# Patient Record
Sex: FEMALE | Race: WHITE | NOT HISPANIC OR LATINO | Employment: UNEMPLOYED | URBAN - METROPOLITAN AREA
[De-identification: names, ages, dates, MRNs, and addresses within clinical notes are randomized per-mention and may not be internally consistent; named-entity substitution may affect disease eponyms.]

---

## 2021-03-25 ENCOUNTER — TELEPHONE (OUTPATIENT)
Dept: INTERNAL MEDICINE CLINIC | Facility: CLINIC | Age: 36
End: 2021-03-25

## 2021-03-25 ENCOUNTER — OFFICE VISIT (OUTPATIENT)
Dept: INTERNAL MEDICINE CLINIC | Facility: CLINIC | Age: 36
End: 2021-03-25
Payer: COMMERCIAL

## 2021-03-25 VITALS
WEIGHT: 152.4 LBS | HEIGHT: 69 IN | DIASTOLIC BLOOD PRESSURE: 70 MMHG | SYSTOLIC BLOOD PRESSURE: 102 MMHG | OXYGEN SATURATION: 97 % | TEMPERATURE: 98.3 F | HEART RATE: 100 BPM | BODY MASS INDEX: 22.57 KG/M2

## 2021-03-25 DIAGNOSIS — Z86.79: ICD-10-CM

## 2021-03-25 DIAGNOSIS — Z00.00 ANNUAL PHYSICAL EXAM: Primary | ICD-10-CM

## 2021-03-25 DIAGNOSIS — R56.9 SEIZURES (HCC): ICD-10-CM

## 2021-03-25 DIAGNOSIS — R21 RASH AND NONSPECIFIC SKIN ERUPTION: ICD-10-CM

## 2021-03-25 DIAGNOSIS — F41.1 GAD (GENERALIZED ANXIETY DISORDER): ICD-10-CM

## 2021-03-25 PROCEDURE — 99204 OFFICE O/P NEW MOD 45 MIN: CPT | Performed by: FAMILY MEDICINE

## 2021-03-25 PROCEDURE — 3725F SCREEN DEPRESSION PERFORMED: CPT | Performed by: FAMILY MEDICINE

## 2021-03-25 RX ORDER — CLOBETASOL PROPIONATE 0.05 MG/G
1 GEL TOPICAL 2 TIMES DAILY
Qty: 30 EACH | Refills: 0 | Status: SHIPPED | OUTPATIENT
Start: 2021-03-25 | End: 2021-03-25

## 2021-03-25 RX ORDER — CHLORHEXIDINE GLUCONATE 0.12 MG/ML
RINSE ORAL
COMMUNITY
Start: 2021-01-23 | End: 2021-03-25

## 2021-03-25 RX ORDER — SODIUM FLUORIDE 6 MG/ML
PASTE, DENTIFRICE DENTAL
COMMUNITY
Start: 2021-01-23 | End: 2022-03-03

## 2021-03-25 RX ORDER — TIZANIDINE 4 MG/1
TABLET ORAL
COMMUNITY
Start: 2021-03-18 | End: 2021-10-15

## 2021-03-25 RX ORDER — CLONAZEPAM 0.5 MG/1
0.5 TABLET, ORALLY DISINTEGRATING ORAL 2 TIMES DAILY
Qty: 60 TABLET | Refills: 0 | Status: SHIPPED | OUTPATIENT
Start: 2021-03-25 | End: 2021-03-25 | Stop reason: SDUPTHER

## 2021-03-25 RX ORDER — CLONAZEPAM 0.5 MG/1
0.5 TABLET, ORALLY DISINTEGRATING ORAL 2 TIMES DAILY PRN
Qty: 60 TABLET | Refills: 0 | Status: SHIPPED | OUTPATIENT
Start: 2021-03-25 | End: 2021-04-21 | Stop reason: SDUPTHER

## 2021-03-25 RX ORDER — ALBUTEROL SULFATE 90 UG/1
AEROSOL, METERED RESPIRATORY (INHALATION)
COMMUNITY
End: 2021-10-15

## 2021-03-25 RX ORDER — CLOBETASOL PROPIONATE 0.5 MG/G
OINTMENT TOPICAL 2 TIMES DAILY
Qty: 30 G | Refills: 0 | Status: SHIPPED | OUTPATIENT
Start: 2021-03-25 | End: 2021-03-30

## 2021-03-25 NOTE — PATIENT INSTRUCTIONS

## 2021-03-25 NOTE — TELEPHONE ENCOUNTER
[facility]     clobetasol (TEMOVATE) 0 05 % GEL not covered    they will cover the cream  need call back to say ok to the cream

## 2021-03-25 NOTE — PROGRESS NOTES
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       Other    SHASHI (generalized anxiety disorder)    Relevant Medications    clonazePAM (KlonoPIN) 0 5 MG disintegrating tablet      Other Visit Diagnoses     Annual physical exam    -  Primary    Relevant Orders    Comprehensive metabolic panel    Lipid Panel with Direct LDL reflex    TSH, 3rd generation with Free T4 reflex    Rash and nonspecific skin eruption        Relevant Medications    clobetasol (TEMOVATE) 0 05 % ointment    Other Relevant Orders    Ambulatory referral to Dermatology    Seizures ([facility] [address] )          Plan: will trial klonopin for anxiety  Pt to re establish with psychiatry  Topical steroid for rash  Pt already has appointment with derm in summer   Pt to continue to follow with neurology for hx of seizure and intracranial AVM  Screening studies ordered  Immunizations and preventive care screenings were discussed with patient today  Appropriate education was printed on patient's after visit summary  Counseling:  Dental Health: discussed importance of regular tooth brushing, flossing, and dental visits  · Sexual health: discussed sexually transmitted diseases, partner selection, use of condoms, avoidance of unintended pregnancy, and contraceptive alternatives  Return in about 3 months (around 2021) for Next scheduled follow up  Chief Complaint:     Chief Complaint   Patient presents with    Establish Care      History of Present Illness:     Adult Annual Physical   Patient here for a comprehensive physical exam  The patient reports problems - rash- wants a derm referral and anxiety   Hx of anxiety since childhood   Hx of valium use  Stopped a year ago  Was stable for a period of time  Hx of seeing psych  Needs to re-establish  Anxiety is now its worse again  Worries about every  Sweats profusely  No particular triggers  denies manifestation of panic   reprots hx of avm of the brain with surgical correction- 2019  Sees neruology yearly  reprots hx of seizures -  Petite mal in quality  no tonic colonic manifestation  Not on AEDs  Last episodes was a year   On SSI  Lives with parents  Doesn't drive  Diet and Physical Activity  · Diet/Nutrition: consuming 3-5 servings of fruits/vegetables daily  · Exercise: no formal exercise  Depression Screening  PHQ-9 Depression Screening    PHQ-9:   Frequency of the following problems over the past two weeks:      Little interest or pleasure in doing things: 1 - several days  Feeling down, depressed, or hopeless: 1 - several days  PHQ-2 Score: 2       SHASHI-7 Flowsheet Screening      Most Recent Value   Over the last two weeks, how often have you been bothered by the following problems? Feeling nervous, anxious, or on edge  3   Not being able to stop or control worrying  3   Worrying too much about different things  3   Trouble relaxing   2   Being so restless that it's hard to sit still  2   Becoming easily annoyed or irritable   3   Feeling afraid as if something awful might happen  0   SHASHI Score   16            General Health  · Sleep: gets 4-6 hours of sleep on average  · Hearing: decreased - left  · Vision: no vision problems  · Dental: regular dental visits  /GYN Health  · Last menstrual period: 2 weeks ago   · Sexually active: yes  Contraceptive method: none   · History of STDs?: no   · Last PAP - years      Review of Systems:     Review of Systems   Constitutional: Negative for appetite change  HENT: Positive for hearing loss  Eyes: Negative for visual disturbance  Respiratory: Negative for shortness of breath  Cardiovascular: Negative for chest pain  Gastrointestinal: Negative for constipation and diarrhea  Musculoskeletal: Positive for back pain  Negative for gait problem  Skin: Positive for rash  Allergic/Immunologic: Positive for environmental allergies  Neurological: Positive for headaches  Psychiatric/Behavioral: Positive for decreased concentration and sleep disturbance  The patient is nervous/anxious         Past Medical History:     Past Medical History:   Diagnosis Date    Anxiety     Bipolar disorder ([facility] [address] )     Depression     OCD (obsessive compulsive disorder)       Past Surgical History:     Past Surgical History:   Procedure Laterality Date    BRAIN SURGERY      KNEE SURGERY        Social History:     E-Cigarette/Vaping    E-Cigarette Use Never User      E-Cigarette/Vaping Substances    Nicotine No     THC No     CBD No     Flavoring No     Other No     Unknown No      Social History     Socioeconomic History    Marital status: Single     Spouse name: None    Number of children: None    Years of education: None    Highest education level: None   Occupational History    None   Social Needs    Financial resource strain: None    Food insecurity     Worry: None     Inability: None    Transportation needs     Medical: None     Non-medical: None   Tobacco Use    Smoking status: Current Every Day Smoker     Packs/day: 0 50    Smokeless tobacco: Never Used   Substance and Sexual Activity    Alcohol use: Not Currently    Drug use: Yes     Types: Marijuana    Sexual activity: Yes   Lifestyle    Physical activity     Days per week: 0 days     Minutes per session: 0 min    Stress: None   Relationships    Social connections     Talks on phone: None     Gets together: None     Attends Quaker service: None     Active member of club or organization: None     Attends meetings of clubs or organizations: None     Relationship status: None    Intimate partner violence     Fear of current or ex partner: None     Emotionally abused: None     Physically abused: None     Forced sexual activity: None   Other Topics Concern    None   Social History Narrative    None      Family History:     History reviewed  No pertinent family history  Current Medications:     Current Outpatient Medications   Medication Sig Dispense Refill    albuterol (PROVENTIL HFA,VENTOLIN HFA) 90 mcg/act inhaler Ventolin HFA 90 mcg/actuation aerosol inhaler      Sodium Fluoride 5000 PPM 1 1 % PSTE       tiZANidine (ZANAFLEX) 4 mg tablet       clobetasol (TEMOVATE) 0 05 % ointment Apply topically 2 (two) times a day 30 g 0    clonazePAM (KlonoPIN) 0 5 MG disintegrating tablet Take 1 tablet (0 5 mg total) by mouth 2 (two) times a day as needed for anxiety or seizures 60 tablet 0     No current facility-administered medications for this visit  Allergies: Allergies   Allergen Reactions    Pregabalin Rash and Swelling    Fentanyl Hives     Fever and migraine      Nitrofurantoin Swelling    Acetaminophen Rash    Cortisone Rash and Swelling    Hydrocodone-Acetaminophen Rash      Physical Exam:     /70   Pulse 100   Temp 98 3 °F (36 8 °C)   Ht 5' 8 5" (1 74 m)   Wt 69 1 kg (152 lb 6 4 oz)   SpO2 97%   BMI 22 84 kg/m²     Physical Exam  Constitutional:       Appearance: She is not ill-appearing  HENT:      Head: Normocephalic and atraumatic  Right Ear: Tympanic membrane and external ear normal       Left Ear: Tympanic membrane and external ear normal       Mouth/Throat:      Dentition: Abnormal dentition  Dental caries present  Eyes:      Conjunctiva/sclera: Conjunctivae normal       Pupils: Pupils are equal, round, and reactive to light  Cardiovascular:      Rate and Rhythm: Normal rate and regular rhythm  Pulmonary:      Effort: Pulmonary effort is normal       Breath sounds: Normal breath sounds     Abdominal:      General: Bowel sounds are normal    Skin:     Findings: Rash (Circular pink macules scattered over abdomen and chest   No scale or crust ) present  Neurological:      Mental Status: She is alert and oriented to person, place, and time  Gait: Gait normal       Deep Tendon Reflexes: Reflexes normal    Psychiatric:         Attention and Perception: Attention normal          Mood and Affect: Mood is anxious  Speech: Speech is tangential          Behavior: Behavior is cooperative  Thought Content: Thought content does not include suicidal ideation                Janelle Woodall DO   [facility] [address] [place] [address]

## 2021-03-25 NOTE — TELEPHONE ENCOUNTER
The pharmacy never received  The rx clonazePAM (KlonoPIN) 0 5 MG disintegrating tablet         [id number] - [place], [place] - [address] [address]

## 2021-03-29 ENCOUNTER — TELEPHONE (OUTPATIENT)
Dept: INTERNAL MEDICINE CLINIC | Facility: CLINIC | Age: 36
End: 2021-03-29

## 2021-03-29 DIAGNOSIS — R21 RASH AND NONSPECIFIC SKIN ERUPTION: Primary | ICD-10-CM

## 2021-03-29 NOTE — TELEPHONE ENCOUNTER
A prior authorization has been completed for the patient, however, on her original denial letter it states that they will cover the clobetasol cream,/solution/ointment or clodan shampoo are covered under formulary, would you like to try an alternate for the patient, please advise

## 2021-03-29 NOTE — TELEPHONE ENCOUNTER
Called Patients pharmacy and to OK the medication, clobetasol (TEMOVATE) 0 05 % ointment but no one answered so LMTCB and PA has been initiated as well on 03/29/2021

## 2021-03-29 NOTE — TELEPHONE ENCOUNTER
Miriam from [facility] called in regards to the prior authorization for clobetasol 0 05% ointment  The ointment was denied at the is time  They will approve the solution or gel

## 2021-03-30 RX ORDER — CLOBETASOL PROPIONATE 0.5 MG/G
CREAM TOPICAL 2 TIMES DAILY
Qty: 30 G | Refills: 0 | Status: SHIPPED | OUTPATIENT
Start: 2021-03-30 | End: 2021-10-15

## 2021-03-30 RX ORDER — CLOBETASOL PROPIONATE 0.46 MG/ML
SOLUTION TOPICAL 2 TIMES DAILY
Qty: 50 ML | Refills: 0 | OUTPATIENT
Start: 2021-03-30

## 2021-04-01 ENCOUNTER — TELEPHONE (OUTPATIENT)
Dept: INTERNAL MEDICINE CLINIC | Facility: CLINIC | Age: 36
End: 2021-04-01

## 2021-04-01 NOTE — TELEPHONE ENCOUNTER
Prior Auth for Clobetasol ointment has been aborted because patient paid out of pocket for ointment and her prescription has been turned over to the Clobetasol cream that does not require PA and she has picked that up at the pharmacy as well

## 2021-04-21 ENCOUNTER — APPOINTMENT (OUTPATIENT)
Dept: LAB | Facility: CLINIC | Age: 36
End: 2021-04-21
Payer: COMMERCIAL

## 2021-04-21 ENCOUNTER — TELEPHONE (OUTPATIENT)
Dept: INTERNAL MEDICINE CLINIC | Facility: CLINIC | Age: 36
End: 2021-04-21

## 2021-04-21 DIAGNOSIS — F41.1 GAD (GENERALIZED ANXIETY DISORDER): ICD-10-CM

## 2021-04-21 DIAGNOSIS — Z00.00 ANNUAL PHYSICAL EXAM: ICD-10-CM

## 2021-04-21 LAB
ALBUMIN SERPL BCP-MCNC: 4 G/DL (ref 3.5–5)
ALP SERPL-CCNC: 46 U/L (ref 46–116)
ALT SERPL W P-5'-P-CCNC: 61 U/L (ref 12–78)
ANION GAP SERPL CALCULATED.3IONS-SCNC: 3 MMOL/L (ref 4–13)
AST SERPL W P-5'-P-CCNC: 20 U/L (ref 5–45)
BILIRUB SERPL-MCNC: 0.51 MG/DL (ref 0.2–1)
BUN SERPL-MCNC: 5 MG/DL (ref 5–25)
CALCIUM SERPL-MCNC: 9.1 MG/DL (ref 8.3–10.1)
CHLORIDE SERPL-SCNC: 110 MMOL/L (ref 100–108)
CHOLEST SERPL-MCNC: 181 MG/DL (ref 50–200)
CO2 SERPL-SCNC: 25 MMOL/L (ref 21–32)
CREAT SERPL-MCNC: 0.65 MG/DL (ref 0.6–1.3)
GFR SERPL CREATININE-BSD FRML MDRD: 115 ML/MIN/1.73SQ M
GLUCOSE P FAST SERPL-MCNC: 96 MG/DL (ref 65–99)
HDLC SERPL-MCNC: 60 MG/DL
LDLC SERPL CALC-MCNC: 109 MG/DL (ref 0–100)
POTASSIUM SERPL-SCNC: 3.7 MMOL/L (ref 3.5–5.3)
PROT SERPL-MCNC: 7.3 G/DL (ref 6.4–8.2)
SODIUM SERPL-SCNC: 138 MMOL/L (ref 136–145)
TRIGL SERPL-MCNC: 61 MG/DL
TSH SERPL DL<=0.05 MIU/L-ACNC: 2.24 UIU/ML (ref 0.36–3.74)

## 2021-04-21 PROCEDURE — 36415 COLL VENOUS BLD VENIPUNCTURE: CPT

## 2021-04-21 PROCEDURE — 80061 LIPID PANEL: CPT

## 2021-04-21 PROCEDURE — 80053 COMPREHEN METABOLIC PANEL: CPT

## 2021-04-21 PROCEDURE — 84443 ASSAY THYROID STIM HORMONE: CPT

## 2021-04-21 RX ORDER — CLONAZEPAM 0.5 MG/1
0.5 TABLET ORAL 2 TIMES DAILY
Qty: 60 TABLET | Refills: 0 | Status: SHIPPED | OUTPATIENT
Start: 2021-04-21 | End: 2021-05-18 | Stop reason: SDUPTHER

## 2021-04-21 RX ORDER — CLONAZEPAM 0.5 MG/1
0.5 TABLET, ORALLY DISINTEGRATING ORAL 2 TIMES DAILY PRN
Qty: 60 TABLET | Refills: 0 | Status: SHIPPED | OUTPATIENT
Start: 2021-04-21 | End: 2022-01-14

## 2021-04-21 RX ORDER — CLONAZEPAM 0.5 MG/1
0.5 TABLET, ORALLY DISINTEGRATING ORAL 2 TIMES DAILY PRN
Qty: 60 TABLET | Refills: 0 | Status: CANCELLED | OUTPATIENT
Start: 2021-04-21 | End: 2021-05-21

## 2021-04-21 NOTE — TELEPHONE ENCOUNTER
----- Message from Kylah Medina sent at 4/21/2021  1:37 PM EDT -----  Please let patient know labs were all good  Thank you

## 2021-04-21 NOTE — TELEPHONE ENCOUNTER
Pt needs a new rx for klonopin sent to medicine [facility] in [address], please advise, call back upon completion

## 2021-04-21 NOTE — TELEPHONE ENCOUNTER
clonazePAM (KlonoPIN) 0 5 MG disintegrating tablet is not covered and she needs the regular tablet prescribed    not disintegrating one

## 2021-04-21 NOTE — TELEPHONE ENCOUNTER
Please reiterate to patient that Dr Yelitza Yen would like her to establish with psychiatry going forwards for psych meds  Thanks

## 2021-04-22 ENCOUNTER — TELEPHONE (OUTPATIENT)
Dept: INTERNAL MEDICINE CLINIC | Facility: CLINIC | Age: 36
End: 2021-04-22

## 2021-04-22 NOTE — TELEPHONE ENCOUNTER
Prior Janey Rice has been initiated to see if we can get the Clonazepam 0 5 Mg Dispersible Tablets covered for the patient, patients most recent chart note has been faxed to [facility] for assessment

## 2021-04-28 NOTE — TELEPHONE ENCOUNTER
Called the prescription coverage plan to check the status of the patients PA, I spoke with Brigitte Saucedo and she said medical records were not sufficient and there was no record that PDMP had been checked to track the patients usage of this drug or any similar drug to the medication, Brigitte Saucedo said she will send over the full list of needed documentation that was missing to approve the PA   Called [facility] faxing to have them look out for the paperwork so I can get it as soon as possible

## 2021-05-18 DIAGNOSIS — F41.1 GAD (GENERALIZED ANXIETY DISORDER): ICD-10-CM

## 2021-05-18 RX ORDER — CLONAZEPAM 0.5 MG/1
0.5 TABLET ORAL 2 TIMES DAILY
Qty: 60 TABLET | Refills: 0 | Status: SHIPPED | OUTPATIENT
Start: 2021-05-18 | End: 2021-06-14 | Stop reason: SDUPTHER

## 2021-06-14 DIAGNOSIS — F41.1 GAD (GENERALIZED ANXIETY DISORDER): ICD-10-CM

## 2021-06-15 RX ORDER — CLONAZEPAM 0.5 MG/1
0.5 TABLET ORAL 2 TIMES DAILY
Qty: 60 TABLET | Refills: 0 | Status: SHIPPED | OUTPATIENT
Start: 2021-06-15 | End: 2021-07-12 | Stop reason: SDUPTHER

## 2021-07-12 DIAGNOSIS — F41.1 GAD (GENERALIZED ANXIETY DISORDER): ICD-10-CM

## 2021-07-12 RX ORDER — CLONAZEPAM 0.5 MG/1
0.5 TABLET ORAL 2 TIMES DAILY
Qty: 60 TABLET | Refills: 0 | Status: SHIPPED | OUTPATIENT
Start: 2021-07-12 | End: 2021-08-09 | Stop reason: SDUPTHER

## 2021-08-09 DIAGNOSIS — F41.1 GAD (GENERALIZED ANXIETY DISORDER): ICD-10-CM

## 2021-08-10 RX ORDER — CLONAZEPAM 0.5 MG/1
0.5 TABLET ORAL 2 TIMES DAILY
Qty: 60 TABLET | Refills: 0 | Status: SHIPPED | OUTPATIENT
Start: 2021-08-10 | End: 2021-09-07 | Stop reason: SDUPTHER

## 2021-09-07 DIAGNOSIS — F41.1 GAD (GENERALIZED ANXIETY DISORDER): ICD-10-CM

## 2021-09-08 DIAGNOSIS — F41.1 GAD (GENERALIZED ANXIETY DISORDER): ICD-10-CM

## 2021-09-08 RX ORDER — CLONAZEPAM 0.5 MG/1
0.5 TABLET ORAL 2 TIMES DAILY
Qty: 60 TABLET | Refills: 0 | Status: SHIPPED | OUTPATIENT
Start: 2021-09-08 | End: 2021-10-04 | Stop reason: SDUPTHER

## 2021-09-08 RX ORDER — CLONAZEPAM 0.5 MG/1
0.5 TABLET ORAL 2 TIMES DAILY
Qty: 60 TABLET | Refills: 0 | OUTPATIENT
Start: 2021-09-08

## 2021-10-04 DIAGNOSIS — F41.1 GAD (GENERALIZED ANXIETY DISORDER): ICD-10-CM

## 2021-10-05 ENCOUNTER — TELEPHONE (OUTPATIENT)
Dept: INTERNAL MEDICINE CLINIC | Facility: CLINIC | Age: 36
End: 2021-10-05

## 2021-10-05 RX ORDER — CLONAZEPAM 0.5 MG/1
0.5 TABLET ORAL 2 TIMES DAILY
Qty: 60 TABLET | Refills: 0 | Status: SHIPPED | OUTPATIENT
Start: 2021-10-05 | End: 2021-10-15 | Stop reason: SDUPTHER

## 2021-10-15 ENCOUNTER — OFFICE VISIT (OUTPATIENT)
Dept: INTERNAL MEDICINE CLINIC | Facility: CLINIC | Age: 36
End: 2021-10-15
Payer: COMMERCIAL

## 2021-10-15 VITALS
BODY MASS INDEX: 21.62 KG/M2 | TEMPERATURE: 98.8 F | HEART RATE: 103 BPM | SYSTOLIC BLOOD PRESSURE: 102 MMHG | DIASTOLIC BLOOD PRESSURE: 58 MMHG | WEIGHT: 146 LBS | HEIGHT: 69 IN | OXYGEN SATURATION: 98 %

## 2021-10-15 DIAGNOSIS — F41.1 GAD (GENERALIZED ANXIETY DISORDER): Primary | ICD-10-CM

## 2021-10-15 PROCEDURE — 3008F BODY MASS INDEX DOCD: CPT | Performed by: FAMILY MEDICINE

## 2021-10-15 PROCEDURE — 99214 OFFICE O/P EST MOD 30 MIN: CPT | Performed by: FAMILY MEDICINE

## 2021-10-15 RX ORDER — ONDANSETRON 4 MG/1
TABLET, FILM COATED ORAL
COMMUNITY
Start: 2021-10-08 | End: 2022-03-03

## 2021-10-15 RX ORDER — DIPHENHYDRAMINE HYDROCHLORIDE 25 MG/1
CAPSULE ORAL
COMMUNITY
Start: 2021-09-24 | End: 2022-03-03

## 2021-10-15 RX ORDER — METOCLOPRAMIDE 10 MG/1
TABLET ORAL
COMMUNITY
Start: 2021-10-14 | End: 2022-03-03

## 2021-10-15 RX ORDER — SERTRALINE HYDROCHLORIDE 25 MG/1
TABLET, FILM COATED ORAL
COMMUNITY
Start: 2021-10-12 | End: 2022-01-14 | Stop reason: SDUPTHER

## 2021-10-15 RX ORDER — CLONAZEPAM 2 MG/1
2 TABLET ORAL 2 TIMES DAILY
Qty: 60 TABLET | Refills: 0 | Status: SHIPPED | OUTPATIENT
Start: 2021-10-15 | End: 2021-11-09 | Stop reason: SDUPTHER

## 2021-11-09 DIAGNOSIS — F41.1 GAD (GENERALIZED ANXIETY DISORDER): ICD-10-CM

## 2021-11-09 RX ORDER — CLONAZEPAM 2 MG/1
2 TABLET ORAL 2 TIMES DAILY
Qty: 60 TABLET | Refills: 0 | Status: SHIPPED | OUTPATIENT
Start: 2021-11-09 | End: 2021-12-06 | Stop reason: SDUPTHER

## 2021-12-06 DIAGNOSIS — F41.1 GAD (GENERALIZED ANXIETY DISORDER): ICD-10-CM

## 2021-12-07 RX ORDER — CLONAZEPAM 2 MG/1
2 TABLET ORAL 2 TIMES DAILY
Qty: 60 TABLET | Refills: 0 | Status: SHIPPED | OUTPATIENT
Start: 2021-12-07 | End: 2022-01-04 | Stop reason: SDUPTHER

## 2022-01-04 DIAGNOSIS — F41.1 GAD (GENERALIZED ANXIETY DISORDER): ICD-10-CM

## 2022-01-04 RX ORDER — CLONAZEPAM 2 MG/1
2 TABLET ORAL 2 TIMES DAILY
Qty: 60 TABLET | Refills: 0 | Status: SHIPPED | OUTPATIENT
Start: 2022-01-04 | End: 2022-01-14

## 2022-01-14 ENCOUNTER — TELEMEDICINE (OUTPATIENT)
Dept: INTERNAL MEDICINE CLINIC | Facility: CLINIC | Age: 37
End: 2022-01-14
Payer: COMMERCIAL

## 2022-01-14 ENCOUNTER — TELEPHONE (OUTPATIENT)
Dept: INTERNAL MEDICINE CLINIC | Facility: CLINIC | Age: 37
End: 2022-01-14

## 2022-01-14 DIAGNOSIS — F41.1 GAD (GENERALIZED ANXIETY DISORDER): Primary | ICD-10-CM

## 2022-01-14 PROCEDURE — 99213 OFFICE O/P EST LOW 20 MIN: CPT | Performed by: FAMILY MEDICINE

## 2022-01-14 RX ORDER — CLONAZEPAM 2 MG/1
2 TABLET ORAL 3 TIMES DAILY
Qty: 90 TABLET | Refills: 0 | Status: SHIPPED | OUTPATIENT
Start: 2022-01-14 | End: 2022-02-07 | Stop reason: SDUPTHER

## 2022-01-14 RX ORDER — SERTRALINE HYDROCHLORIDE 25 MG/1
25 TABLET, FILM COATED ORAL DAILY
Qty: 30 TABLET | Refills: 1 | Status: SHIPPED | OUTPATIENT
Start: 2022-01-14 | End: 2022-03-03

## 2022-01-14 NOTE — PROGRESS NOTES
Virtual Regular Visit    Verification of patient location:    Patient is located in the following state in which I hold an active license [place]      Assessment/Plan:    Problem List Items Addressed This Visit        Other    SHASHI (generalized anxiety disorder) - Primary    Relevant Medications    clonazePAM (KlonoPIN) 2 mg tablet    sertraline (ZOLOFT) 25 mg tablet        SHASHI not well controlled  Post partum  No SI  Not breast feeding  Increase klonopin TID and add SSRI  Pt to f/u in 4 weeks  Reason for visit is   Chief Complaint   Patient presents with    Virtual Regular Visit        Encounter provider Kimberlyn Castillo DO    Provider located at [address] [place] [address]      Recent Visits  No visits were found meeting these conditions  Showing recent visits within past 7 days and meeting all other requirements  Today's Visits  Date Type Provider Dept   01/14/22 Telephone [facility]   01/14/22 Telemedicine [address] today's visits and meeting all other requirements  Future Appointments  No visits were found meeting these conditions  Showing future appointments within next 150 days and meeting all other requirements       The patient was identified by name and date of birth  Shawna Pruitt was informed that this is a telemedicine visit and that the visit is being conducted through [facility] and patient was informed this is a secure, HIPAA-complaint platform  She agrees to proceed     My office door was closed  No one else was in the room  She acknowledged consent and understanding of privacy and security of the video platform  The patient has agreed to participate and understands they can discontinue the visit at any time  Patient is aware this is a billable service  Subjective  Shawna Pruitt is a 39 y o  female   Presents for anxiety f/u  Postpartum  Feels like anxiety is worsening  klonopin twice a days isn't helping enough  Hasn't restart zoloft  Hasn't establish with psych due to recent delivery  Pt is not breast feeding  Past Medical History:   Diagnosis Date    Anxiety     Bipolar disorder ([address] )     Depression     OCD (obsessive compulsive disorder)        Past Surgical History:   Procedure Laterality Date    BRAIN SURGERY      KNEE SURGERY         Current Outpatient Medications   Medication Sig Dispense Refill    clonazePAM (KlonoPIN) 2 mg tablet Take 1 tablet (2 mg total) by mouth 3 (three) times a day 90 tablet 0    metoclopramide (REGLAN) 10 mg tablet       ondansetron (ZOFRAN) 4 mg tablet       Pyridoxine HCl (vitamin B-6) 25 MG tablet       sertraline (ZOLOFT) 25 mg tablet Take 1 tablet (25 mg total) by mouth daily 30 tablet 1    Sodium Fluoride 5000 PPM 1 1 % PSTE        No current facility-administered medications for this visit  Allergies   Allergen Reactions    Pregabalin Rash and Swelling    Fentanyl Hives     Fever and migraine      Nitrofurantoin Swelling    Acetaminophen Rash    Cortisone Rash and Swelling    Hydrocodone-Acetaminophen Rash       Review of Systems   Psychiatric/Behavioral: Negative for suicidal ideas  The patient is nervous/anxious  Video Exam    There were no vitals filed for this visit  Physical Exam  Pulmonary:      Effort: Pulmonary effort is normal    Neurological:      Mental Status: She is alert and oriented to person, place, and time  I spent 5 minutes directly with the patient during this visit    VIRTUAL VISIT [address] verbally agrees to participate in [employer]   Pt is aware that [employer] could be limited without vital signs or the ability to perform a full hands-on physical exam  Yoly Mumilan understands she or the provider may request at any time to terminate the video visit and request the patient to seek care or treatment in person

## 2022-02-07 ENCOUNTER — TELEPHONE (OUTPATIENT)
Dept: INTERNAL MEDICINE CLINIC | Facility: CLINIC | Age: 37
End: 2022-02-07

## 2022-02-07 DIAGNOSIS — F41.1 GAD (GENERALIZED ANXIETY DISORDER): ICD-10-CM

## 2022-02-07 DIAGNOSIS — Z13.9 SCREENING DUE: Primary | ICD-10-CM

## 2022-02-07 NOTE — TELEPHONE ENCOUNTER
Patient has an appointment for 2/25 and needs lab orders  Advise patient when orders are ready so she can have done prior to appointment

## 2022-02-08 RX ORDER — CLONAZEPAM 2 MG/1
2 TABLET ORAL 3 TIMES DAILY
Qty: 90 TABLET | Refills: 0 | Status: SHIPPED | OUTPATIENT
Start: 2022-02-08 | End: 2022-03-01 | Stop reason: SDUPTHER

## 2022-02-10 ENCOUNTER — TELEPHONE (OUTPATIENT)
Dept: INTERNAL MEDICINE CLINIC | Facility: CLINIC | Age: 37
End: 2022-02-10

## 2022-02-10 NOTE — TELEPHONE ENCOUNTER
Received by mail request from [place] [employer] for medical records faxed request to [facility] today 2/10/2022

## 2022-02-25 ENCOUNTER — TELEPHONE (OUTPATIENT)
Dept: OTHER | Facility: OTHER | Age: 37
End: 2022-02-25

## 2022-03-01 DIAGNOSIS — F41.1 GAD (GENERALIZED ANXIETY DISORDER): ICD-10-CM

## 2022-03-02 RX ORDER — CLONAZEPAM 2 MG/1
2 TABLET ORAL 3 TIMES DAILY
Qty: 90 TABLET | Refills: 0 | Status: SHIPPED | OUTPATIENT
Start: 2022-03-04 | End: 2022-03-03 | Stop reason: SDUPTHER

## 2022-03-03 ENCOUNTER — OFFICE VISIT (OUTPATIENT)
Dept: INTERNAL MEDICINE CLINIC | Facility: CLINIC | Age: 37
End: 2022-03-03
Payer: COMMERCIAL

## 2022-03-03 VITALS
SYSTOLIC BLOOD PRESSURE: 112 MMHG | HEIGHT: 69 IN | BODY MASS INDEX: 18.78 KG/M2 | DIASTOLIC BLOOD PRESSURE: 78 MMHG | WEIGHT: 126.8 LBS | HEART RATE: 90 BPM | OXYGEN SATURATION: 99 %

## 2022-03-03 DIAGNOSIS — Z86.79: ICD-10-CM

## 2022-03-03 DIAGNOSIS — R21 RASH AND NONSPECIFIC SKIN ERUPTION: ICD-10-CM

## 2022-03-03 DIAGNOSIS — R79.89 ELEVATED TSH: ICD-10-CM

## 2022-03-03 DIAGNOSIS — Z30.41 ENCOUNTER FOR SURVEILLANCE OF CONTRACEPTIVE PILLS: ICD-10-CM

## 2022-03-03 DIAGNOSIS — E44.1 MILD PROTEIN-CALORIE MALNUTRITION (HCC): ICD-10-CM

## 2022-03-03 DIAGNOSIS — F41.1 GAD (GENERALIZED ANXIETY DISORDER): Primary | ICD-10-CM

## 2022-03-03 PROCEDURE — 3725F SCREEN DEPRESSION PERFORMED: CPT | Performed by: FAMILY MEDICINE

## 2022-03-03 PROCEDURE — 3008F BODY MASS INDEX DOCD: CPT | Performed by: FAMILY MEDICINE

## 2022-03-03 PROCEDURE — 99214 OFFICE O/P EST MOD 30 MIN: CPT | Performed by: FAMILY MEDICINE

## 2022-03-03 RX ORDER — CLONAZEPAM 2 MG/1
2 TABLET ORAL 3 TIMES DAILY
Qty: 90 TABLET | Refills: 0 | Status: SHIPPED | OUTPATIENT
Start: 2022-03-04 | End: 2022-03-30 | Stop reason: SDUPTHER

## 2022-03-03 RX ORDER — NORETHINDRONE ACETATE AND ETHINYL ESTRADIOL 1MG-20(21)
1 KIT ORAL DAILY
Qty: 30 TABLET | Refills: 1 | Status: SHIPPED | OUTPATIENT
Start: 2022-03-03 | End: 2022-04-22 | Stop reason: SDUPTHER

## 2022-03-03 RX ORDER — LACTOSE-REDUCED FOOD
1 LIQUID (ML) ORAL 3 TIMES DAILY
Qty: 67500 ML | Refills: 0 | Status: SHIPPED | OUTPATIENT
Start: 2022-03-03 | End: 2022-08-09

## 2022-03-03 NOTE — PROGRESS NOTES
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       Other    SHASHI (generalized anxiety disorder) - Primary    Relevant Medications    clonazePAM (KlonoPIN) 2 mg tablet    Other Relevant Orders    Ambulatory Referral to Psychiatry      Other Visit Diagnoses     Mild protein-calorie malnutrition ([address] )        Relevant Medications    Nutritional Supplements ([employer]ure)    Encounter for surveillance of contraceptive pills        Relevant Medications    norethindrone-ethinyl estradiol (Junel FE 1/20) 1-20 MG-MCG per tablet    Rash and nonspecific skin eruption        Relevant Orders    Ambulatory Referral to Dermatology    Elevated TSH        Relevant Orders    TSH, 3rd generation with Free T4 reflex        Will continue with klonopin 2mg tid for anxiety for now  Pt to establish with psychiatry for further managment  Pt will continue work up with GI due to weight loss  Continue to use nutriotional supplementation  BMI stable for now  TSH slightly elevated  possibly transient in the setting of recent delivery  Will monitor with repeat tsh  If persistent elevated and above 5 would consider synthroid  Alterative OCP provided  Depression Screening and Follow-up Plan: Patient's depression screening was positive with a PHQ-2 score of 3  Their PHQ-9 score was 3  Patient assessed for underlying major depression  Brief counseling provided and recommend additional follow-up/re-evaluation next office visit  Return in about 6 months (around 9/3/2022)  Chief Complaint:     Chief Complaint   Patient presents with    Follow-up     had labs done  History of Present Illness:     Pt presents for f/u  Reviewed labs   States she is no longer taking zoloft  Didn't seem to help as much as the klonopin  Since delivery of child pt reports some worsening of anxiety  Has colonscopy scheduled for April 26 due to weight loss     On OCPs  Not agreeing with her  Would like to try a different OCP  Review of Systems:     Review of Systems     Problem List:     Patient Active Problem List   Diagnosis    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    SHASHI (generalized anxiety disorder)        Objective:     /78 (BP Location: Left arm, Patient Position: Sitting, Cuff Size: Standard)   Pulse 90   Ht 5' 8 5" (1 74 m)   Wt 57 5 kg (126 lb 12 8 oz)   SpO2 99%   BMI 19 00 kg/m²     Physical Exam  HENT:      Head: Normocephalic and atraumatic  Eyes:      Conjunctiva/sclera: Conjunctivae normal    Cardiovascular:      Rate and Rhythm: Normal rate  Pulmonary:      Effort: Pulmonary effort is normal    Neurological:      Mental Status: She is alert and oriented to person, place, and time  Gait: Gait normal    Psychiatric:         Attention and Perception: Attention normal          Mood and Affect: Mood is anxious  Thought Content: Thought content does not include suicidal ideation           Pertinent Laboratory/Diagnostic Studies:    Laboratory Results: I have personally reviewed the pertinent laboratory results/reports     Chemistry Profile:   Results from Last 12 Months   Lab Units 04/21/21  0916   POTASSIUM mmol/L 3 7   CHLORIDE mmol/L 110*   CO2 mmol/L 25   BUN mg/dL 5   CREATININE mg/dL 0 65   GLUCOSE FASTING mg/dL 96   CALCIUM mg/dL 9 1   AST U/L 20   ALT U/L 61   ALK PHOS U/L 46   EGFR ml/min/1 73sq m 115     Endocrine Studies:   Results from Last 12 Months   Lab Units 04/21/21  0916   TSH 3RD GENERATON uIU/mL 2 240   TRIGLYCERIDES mg/dL 61   CHOLESTEROL mg/dL 181   HDL mg/dL 60   LDL CALC mg/dL 109*         Diane Hooks: [facility]  3/5/2022 8:02 PM

## 2022-03-30 DIAGNOSIS — F41.1 GAD (GENERALIZED ANXIETY DISORDER): ICD-10-CM

## 2022-03-31 RX ORDER — CLONAZEPAM 2 MG/1
2 TABLET ORAL 3 TIMES DAILY
Qty: 90 TABLET | Refills: 0 | Status: SHIPPED | OUTPATIENT
Start: 2022-03-31 | End: 2022-04-22 | Stop reason: SDUPTHER

## 2022-04-04 ENCOUNTER — APPOINTMENT (OUTPATIENT)
Dept: LAB | Facility: CLINIC | Age: 37
End: 2022-04-04
Payer: COMMERCIAL

## 2022-04-04 ENCOUNTER — OFFICE VISIT (OUTPATIENT)
Dept: INTERNAL MEDICINE CLINIC | Facility: CLINIC | Age: 37
End: 2022-04-04
Payer: COMMERCIAL

## 2022-04-04 VITALS
BODY MASS INDEX: 18.01 KG/M2 | TEMPERATURE: 97.7 F | RESPIRATION RATE: 16 BRPM | SYSTOLIC BLOOD PRESSURE: 98 MMHG | HEART RATE: 90 BPM | DIASTOLIC BLOOD PRESSURE: 70 MMHG | OXYGEN SATURATION: 97 % | HEIGHT: 69 IN | WEIGHT: 121.6 LBS

## 2022-04-04 DIAGNOSIS — R79.89 ELEVATED TSH: ICD-10-CM

## 2022-04-04 DIAGNOSIS — Z11.4 SCREENING FOR HIV (HUMAN IMMUNODEFICIENCY VIRUS): ICD-10-CM

## 2022-04-04 DIAGNOSIS — R63.4 RECENT UNEXPLAINED WEIGHT LOSS: Primary | ICD-10-CM

## 2022-04-04 DIAGNOSIS — Z13.9 SCREENING DUE: ICD-10-CM

## 2022-04-04 DIAGNOSIS — Z11.59 NEED FOR HEPATITIS C SCREENING TEST: ICD-10-CM

## 2022-04-04 LAB
ALBUMIN SERPL BCP-MCNC: 3.9 G/DL (ref 3.5–5)
ALP SERPL-CCNC: 36 U/L (ref 46–116)
ALT SERPL W P-5'-P-CCNC: 19 U/L (ref 12–78)
ANION GAP SERPL CALCULATED.3IONS-SCNC: 4 MMOL/L (ref 4–13)
AST SERPL W P-5'-P-CCNC: 12 U/L (ref 5–45)
BILIRUB SERPL-MCNC: 0.62 MG/DL (ref 0.2–1)
BUN SERPL-MCNC: 8 MG/DL (ref 5–25)
CALCIUM SERPL-MCNC: 8.9 MG/DL (ref 8.3–10.1)
CHLORIDE SERPL-SCNC: 106 MMOL/L (ref 100–108)
CHOLEST SERPL-MCNC: 158 MG/DL
CO2 SERPL-SCNC: 28 MMOL/L (ref 21–32)
CREAT SERPL-MCNC: 0.82 MG/DL (ref 0.6–1.3)
GFR SERPL CREATININE-BSD FRML MDRD: 91 ML/MIN/1.73SQ M
GLUCOSE P FAST SERPL-MCNC: 105 MG/DL (ref 65–99)
HCV AB SER QL: NORMAL
HDLC SERPL-MCNC: 59 MG/DL
LDLC SERPL CALC-MCNC: 85 MG/DL (ref 0–100)
POTASSIUM SERPL-SCNC: 3.8 MMOL/L (ref 3.5–5.3)
PROT SERPL-MCNC: 7.4 G/DL (ref 6.4–8.2)
SODIUM SERPL-SCNC: 138 MMOL/L (ref 136–145)
T4 FREE SERPL-MCNC: 0.88 NG/DL (ref 0.76–1.46)
TRIGL SERPL-MCNC: 68 MG/DL
TSH SERPL DL<=0.05 MIU/L-ACNC: 4.68 UIU/ML (ref 0.45–4.5)

## 2022-04-04 PROCEDURE — 99213 OFFICE O/P EST LOW 20 MIN: CPT

## 2022-04-04 PROCEDURE — 36415 COLL VENOUS BLD VENIPUNCTURE: CPT

## 2022-04-04 PROCEDURE — 84443 ASSAY THYROID STIM HORMONE: CPT

## 2022-04-04 PROCEDURE — 3008F BODY MASS INDEX DOCD: CPT

## 2022-04-04 PROCEDURE — 80053 COMPREHEN METABOLIC PANEL: CPT

## 2022-04-04 PROCEDURE — 80061 LIPID PANEL: CPT

## 2022-04-04 PROCEDURE — 84439 ASSAY OF FREE THYROXINE: CPT

## 2022-04-04 PROCEDURE — 86803 HEPATITIS C AB TEST: CPT

## 2022-04-04 PROCEDURE — 87389 HIV-1 AG W/HIV-1&-2 AB AG IA: CPT

## 2022-04-04 RX ORDER — TIZANIDINE HYDROCHLORIDE 4 MG/1
4 CAPSULE, GELATIN COATED ORAL 3 TIMES DAILY PRN
COMMUNITY
End: 2022-08-09 | Stop reason: ALTCHOICE

## 2022-04-04 NOTE — PROGRESS NOTES
[facility]    NAME: Syd Hoover  AGE: 39 y o  SEX: female  : 1985     DATE: 2022     Assessment and Plan:     Problem List Items Addressed This Visit        Other    Recent unexplained weight loss - Primary     Patient states she has lost 30 lb during her pregnancy and since her pregnancy  She states she continues to lose weight despite trying to eat high caloric foods  She does have a follow-up with Gastroenterology for this month  She reports some abdominal discomfort with eating  I recommend patient avoid highly processed and fast foods  I encouraged her to eat higher caloric foods from healthier sources  Review some of these options such as nut [place], avocados, olive oil, etc with her  Encouraged patient to have her evaluation by Gastroenterology  Will follow-up with patient after her endoscopy  Other Visit Diagnoses     Screening for HIV (human immunodeficiency virus)        Relevant Orders    HIV 1/2 Antigen/Antibody (4th Generation) w Reflex [facility]    Need for hepatitis C screening test        Relevant Orders    Hepatitis C Antibody ([facility]) (Completed)              No follow-ups on file  Chief Complaint:     Chief Complaint   Patient presents with    Chest Pain     labs        History of Present Illness:     Benigno Richard presents to the office today for evaluation of epigastric discomfort  She states she has pain over her xiphoid bone since the birth of her child at the end of November  She also has concerns about unexplained continue weight loss  She has an appointment coming up with gastroenterologist in month       Review of Systems:     Review of Systems     Problem List:     Patient Active Problem List   Diagnosis    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    SHASHI (generalized anxiety disorder)    Recent unexplained weight loss        Objective:     BP 98/70 (BP Location: Left arm, Patient Position: Sitting, Cuff Size: Standard)   Pulse 90   Temp 97 7 °F (36 5 °C) (Tympanic)   Resp 16   Ht 5' 8 5" (1 74 m)   Wt 55 2 kg (121 lb 9 6 oz)   SpO2 97%   BMI 18 22 kg/m²     Physical Exam  Vitals and nursing note reviewed  Constitutional:       General: She is not in acute distress  Appearance: Normal appearance  She is well-developed  HENT:      Head: Normocephalic and atraumatic  Nose: No congestion  Mouth/Throat:      Mouth: Mucous membranes are moist       Pharynx: Oropharynx is clear  Eyes:      Conjunctiva/sclera: Conjunctivae normal    Cardiovascular:      Rate and Rhythm: Normal rate and regular rhythm  Pulses: Normal pulses  Heart sounds: Normal heart sounds  No murmur heard  Pulmonary:      Effort: Pulmonary effort is normal  No respiratory distress  Breath sounds: Normal breath sounds  Abdominal:      General: Bowel sounds are normal       Palpations: Abdomen is soft  Tenderness: There is no abdominal tenderness  Musculoskeletal:         General: Normal range of motion  Cervical back: Neck supple  Skin:     General: Skin is warm and dry  Capillary Refill: Capillary refill takes less than 2 seconds  Neurological:      Mental Status: She is alert and oriented to person, place, and time  Psychiatric:         Mood and Affect: Mood is anxious  Behavior: Behavior normal          Thought Content: Thought content normal          Judgment: Judgment normal          I spent 15 minutes with this patient      [address] [place] [address]  [facility]

## 2022-04-04 NOTE — PATIENT INSTRUCTIONS
GERD (Gastroesophageal Reflux Disease)   WHAT YOU NEED TO KNOW:   Gastroesophageal reflux disease (GERD) is reflux that happens more than 2 times a week for a few weeks  Reflux means acid and food in your stomach back up into your esophagus  GERD can cause other health problems over time if it is not treated  DISCHARGE INSTRUCTIONS:   Call your local emergency number (911 in the [address] [facility]) if:   · You have severe chest pain and sudden trouble breathing  Return to the emergency department if:   · You have trouble breathing after you vomit  · You have trouble swallowing, or pain with swallowing  · Your bowel movements are black, bloody, or tarry-looking  · Your vomit looks like coffee grounds or has blood in it  Call your doctor or gastroenterologist if:   · You feel full and cannot burp or vomit  · You vomit large amounts, or you vomit often  · You are losing weight without trying  · Your symptoms get worse or do not improve with treatment  · You have questions or concerns about your condition or care  Medicines:   · Medicines  are used to decrease stomach acid  Medicine may also be used to help your lower esophageal sphincter and stomach contract (tighten) more  · Take your medicine as directed  Contact your healthcare provider if you think your medicine is not helping or if you have side effects  Tell him of her if you are allergic to any medicine  Keep a list of the medicines, vitamins, and herbs you take  Include the amounts, and when and why you take them  Bring the list or the pill bottles to follow-up visits  Carry your medicine list with you in case of an emergency  Manage GERD:       · Do not have foods or drinks that may increase heartburn  These include chocolate, peppermint, fried or fatty foods, drinks that contain caffeine, or carbonated drinks (soda)  Other foods include spicy foods, onions, tomatoes, and tomato-based foods   Do not have foods or drinks that can irritate your esophagus, such as citrus fruits, juices, and alcohol  · Do not eat large meals  When you eat a lot of food at one time, your stomach needs more acid to digest it  Eat 6 small meals each day instead of 3 large ones, and eat slowly  Do not eat meals 2 to 3 hours before bedtime  · Elevate the head of your bed  Place 6-inch blocks under the head of your bed frame  You may also use more than one pillow under your head and shoulders while you sleep  · Maintain a healthy weight  If you are overweight, weight loss may help relieve symptoms of GERD  · Do not smoke  Smoking weakens the lower esophageal sphincter and increases the risk of GERD  Ask your healthcare provider for information if you currently smoke and need help to quit  E-cigarettes or smokeless tobacco still contain nicotine  Talk to your healthcare provider before you use these products  · Do not put pressure on your abdomen  Pressure pushes acid up into your esophagus  Do not wear clothing that is tight around your waist  Do not bend over  Bend at the knees if you need to pick something up  Follow up with your doctor or gastroenterologist as directed:  Write down your questions so you remember to ask them during your visits  © Copyright [employer] 2022 Information is for End User's use only and may not be sold, redistributed or otherwise used for commercial purposes  All illustrations and images included in CareNotes® are the copyrighted property of [employer]  or Casie Ordonez  The above information is an  only  It is not intended as medical advice for individual conditions or treatments  Talk to your doctor, nurse or pharmacist before following any medical regimen to see if it is safe and effective for you

## 2022-04-05 LAB — HIV 1+2 AB+HIV1 P24 AG SERPL QL IA: NORMAL

## 2022-04-05 NOTE — ASSESSMENT & PLAN NOTE
Patient states she has lost 30 lb during her pregnancy and since her pregnancy  She states she continues to lose weight despite trying to eat high caloric foods  She does have a follow-up with Gastroenterology for this month  She reports some abdominal discomfort with eating  I recommend patient avoid highly processed and fast foods  I encouraged her to eat higher caloric foods from healthier sources  Review some of these options such as nut Winger, avocados, olive oil, etc with her  Encouraged patient to have her evaluation by Gastroenterology  Will follow-up with patient after her endoscopy

## 2022-04-22 DIAGNOSIS — Z30.41 ENCOUNTER FOR SURVEILLANCE OF CONTRACEPTIVE PILLS: ICD-10-CM

## 2022-04-22 DIAGNOSIS — F41.1 GAD (GENERALIZED ANXIETY DISORDER): ICD-10-CM

## 2022-04-23 RX ORDER — NORETHINDRONE ACETATE AND ETHINYL ESTRADIOL 1MG-20(21)
1 KIT ORAL DAILY
Qty: 90 TABLET | Refills: 1 | Status: SHIPPED | OUTPATIENT
Start: 2022-04-23 | End: 2022-08-02 | Stop reason: SDUPTHER

## 2022-04-23 RX ORDER — CLONAZEPAM 2 MG/1
2 TABLET ORAL 3 TIMES DAILY
Qty: 90 TABLET | Refills: 0 | Status: SHIPPED | OUTPATIENT
Start: 2022-04-23 | End: 2022-05-25 | Stop reason: SDUPTHER

## 2022-05-25 ENCOUNTER — TELEPHONE (OUTPATIENT)
Dept: INTERNAL MEDICINE CLINIC | Facility: CLINIC | Age: 37
End: 2022-05-25

## 2022-05-25 DIAGNOSIS — F41.1 GAD (GENERALIZED ANXIETY DISORDER): ICD-10-CM

## 2022-05-25 RX ORDER — CLONAZEPAM 2 MG/1
2 TABLET ORAL 3 TIMES DAILY
Qty: 90 TABLET | Refills: 0 | Status: SHIPPED | OUTPATIENT
Start: 2022-05-25 | End: 2022-06-21 | Stop reason: SDUPTHER

## 2022-05-25 NOTE — TELEPHONE ENCOUNTER
Pt called to let the office know that her pharmacy will be closed on Sat May 28, also      It would have to be filled on Fri, May 27  [facility]    Clonazepam     The [facility]

## 2022-06-07 ENCOUNTER — TELEPHONE (OUTPATIENT)
Dept: INTERNAL MEDICINE CLINIC | Facility: CLINIC | Age: 37
End: 2022-06-07

## 2022-06-08 ENCOUNTER — DOCUMENTATION (OUTPATIENT)
Dept: PSYCHIATRY | Facility: CLINIC | Age: 37
End: 2022-06-08

## 2022-06-08 NOTE — PSYCH
Pt presents today with her mother and  for new pt eval   States, "My doctor can't give me anything stronger  I used to take valium every 4 hrs for 15 yrs"  Currently on klonopin 2 mg tid in addition to medical marijuana  MERY explained to [facility] that I would be very willing to do full psychiatric evaluation and make other recommendations but this office would not be prescribing klonopin in doses higher than 6 mg/d or valium regardless of the findings on psychiatric evaluation  Pt chose to leave  Asked to take new pt forms with her and these were given back to her  States she will f/u with current prescriber  Mery notified Dr Josse Tomas

## 2022-06-10 NOTE — TELEPHONE ENCOUNTER
Needs appt
Patient is aware needs an appointment for med refill and one is set for 6/21/2022 @ 9am
RX   REFILL  NOT  ON  RX  LIST   BUT  WANTS  AN   RX    CELEBREX   100 MG   PT  SAID  SHE  GOT  THIS  RX  FROM  ANOTHER   DR   FEW  YEARS   AGO   TOLD  PT  SHE NEEDS  AN  APPT   BUT  SHE  WANTED  ME  TO  CHECK  TO  SEE  IF  IT  COULD  JUST  BE  SENT  INTO  THE  PHARM     [facility]  [place]  PT  SAID  SHE  HAS  SEEN  VALENTIN  BEFORE
she  has  irritation  on  her   gum

## 2022-06-21 ENCOUNTER — OFFICE VISIT (OUTPATIENT)
Dept: INTERNAL MEDICINE CLINIC | Facility: CLINIC | Age: 37
End: 2022-06-21
Payer: COMMERCIAL

## 2022-06-21 VITALS
RESPIRATION RATE: 16 BRPM | HEART RATE: 82 BPM | DIASTOLIC BLOOD PRESSURE: 84 MMHG | SYSTOLIC BLOOD PRESSURE: 114 MMHG | HEIGHT: 69 IN | WEIGHT: 124.8 LBS | BODY MASS INDEX: 18.48 KG/M2

## 2022-06-21 DIAGNOSIS — F41.1 GAD (GENERALIZED ANXIETY DISORDER): ICD-10-CM

## 2022-06-21 DIAGNOSIS — M25.50 ARTHRALGIA, UNSPECIFIED JOINT: Primary | ICD-10-CM

## 2022-06-21 PROCEDURE — 99214 OFFICE O/P EST MOD 30 MIN: CPT | Performed by: NURSE PRACTITIONER

## 2022-06-21 PROCEDURE — 3008F BODY MASS INDEX DOCD: CPT | Performed by: NURSE PRACTITIONER

## 2022-06-21 RX ORDER — IBUPROFEN 800 MG/1
800 TABLET ORAL EVERY 8 HOURS PRN
Qty: 60 TABLET | Refills: 0 | Status: SHIPPED | OUTPATIENT
Start: 2022-06-21

## 2022-06-21 RX ORDER — CLONAZEPAM 2 MG/1
2 TABLET ORAL 3 TIMES DAILY
Qty: 90 TABLET | Refills: 0 | Status: SHIPPED | OUTPATIENT
Start: 2022-06-21 | End: 2022-07-21 | Stop reason: SDUPTHER

## 2022-06-21 NOTE — PROGRESS NOTES
[facility]    NAME: Pattie Gates  AGE: 39 y o  SEX: female  : 1985     DATE: 2022     Assessment and Plan:     1  SHASHI (generalized anxiety disorder)  Long converstation had with patient about this dose on benzo, risk/benefit/side effects  States has seen psych and they cannot help her ???? I have refilled but discussed she will need to make appt w/ her primary for further prescriptions  Agreement signed  - clonazePAM (KlonoPIN) 2 mg tablet; Take 1 tablet (2 mg total) by mouth 3 (three) times a day  Dispense: 90 tablet; Refill: 0    2  Arthralgia, unspecified joint  Pt states has hx of RA altought I do not see this is her hx  During visit she asked multiple times for narcotics  I declined  I find it reasonable to work up her pain and follow up w/ primary after    - ibuprofen (MOTRIN) 800 mg tablet; Take 1 tablet (800 mg total) by mouth every 8 (eight) hours as needed for mild pain  Dispense: 60 tablet; Refill: 0  - JORGITO Screen w/ Reflex to Titer/Pattern; Future  - C-reactive protein; Future  - Lyme Total Antibody Profile with reflex to WB; Future  - RF Screen w/ Reflex to Titer; Future  - Sedimentation rate, automated; Future  - Vitamin D 25 hydroxy; Future  - Cyclic citrul peptide antibody, IgG; Future      Tobacco Cessation Counseling: The patient is sincerely urged to quit consumption of tobacco  She is not ready to quit tobacco       No follow-ups on file  History of Present Illness:     Patient is here to refill her clonazepam she is taking 2 mg 3 times a day states that she takes that for her depression anxiety bipolar and seizure disorder states she had been taking Valium but was switched to clonazepam   States she has seen Psychiatry in the past and that they could not fix her and offered her no other medications that she had been on medications in the past that were ineffective    She also on multiple occasions on multiple times during the visit requests a narcotic  She states she has tremendous pain she can't sleep she has so much pain she can even shower  She states she has neck pain back pain wrist and knee pain she states that when she was younger she was told she had rheumatoid arthritis  She has never seen a rheumatologist     Review of Systems:     Review of Systems   Constitutional: Negative for appetite change, chills, diaphoresis, fatigue, fever and unexpected weight change  HENT: Negative for postnasal drip and sneezing  Eyes: Negative for visual disturbance  Respiratory: Negative for chest tightness and shortness of breath  Cardiovascular: Negative for chest pain, palpitations and leg swelling  Gastrointestinal: Negative for abdominal pain and blood in stool  Endocrine: Negative for cold intolerance, heat intolerance, polydipsia, polyphagia and polyuria  Genitourinary: Negative for difficulty urinating, dysuria, frequency and urgency  Musculoskeletal: Positive for arthralgias, back pain, myalgias and neck pain  Skin: Negative for rash and wound  Neurological: Negative for dizziness, weakness, light-headedness and headaches  Hematological: Negative for adenopathy  Psychiatric/Behavioral: Negative for confusion, dysphoric mood and sleep disturbance  The patient is not nervous/anxious  Objective:     /84 (BP Location: Left arm, Patient Position: Sitting, Cuff Size: Standard) Comment: l  Pulse 82   Resp 16   Ht 5' 8 5" (1 74 m)   Wt 56 6 kg (124 lb 12 8 oz)   BMI 18 70 kg/m²     Physical Exam  Constitutional:       Appearance: She is well-developed  HENT:      Head: Normocephalic and atraumatic  Eyes:      Pupils: Pupils are equal, round, and reactive to light  Pulmonary:      Effort: Pulmonary effort is normal    Neurological:      Mental Status: She is alert and oriented to person, place, and time           GARRY Basilio  [facility]

## 2022-07-01 ENCOUNTER — TELEPHONE (OUTPATIENT)
Dept: PSYCHIATRY | Facility: CLINIC | Age: 37
End: 2022-07-01

## 2022-07-01 NOTE — TELEPHONE ENCOUNTER
called pt in regards to cancelled appointment  Spoke w  pt to notify patient due to county and insurance we would not be able to schedule her but would need to be placed on the waitlist until further notice   pt stated she no longer wanted services

## 2022-07-21 DIAGNOSIS — F41.1 GAD (GENERALIZED ANXIETY DISORDER): ICD-10-CM

## 2022-07-22 RX ORDER — CLONAZEPAM 2 MG/1
2 TABLET ORAL 2 TIMES DAILY
Qty: 60 TABLET | Refills: 0 | Status: SHIPPED | OUTPATIENT
Start: 2022-07-22 | End: 2022-08-19 | Stop reason: SDUPTHER

## 2022-07-22 NOTE — TELEPHONE ENCOUNTER
Klonopin three times a day is no longer recommended due to safety profile  Please notify pt that her klonopin is being decreased to twice a day  She is to establish with psych via [facility] or [facility] behavioral wellness for further treatment

## 2022-07-25 ENCOUNTER — TELEPHONE (OUTPATIENT)
Dept: INTERNAL MEDICINE CLINIC | Facility: CLINIC | Age: 37
End: 2022-07-25

## 2022-07-25 NOTE — TELEPHONE ENCOUNTER
Patient want's to know why her clonazePAM (KlonoPIN) 2 mg tablet  Was change from 3 time daily to 2 time daily ?

## 2022-07-25 NOTE — TELEPHONE ENCOUNTER
Klonopin three times a day is no longer recommended due to its poor safety profile  Please notify pt that her klonopin is being decreased to twice a day  She is to establish with psych via [facility] or [facility] behavioral wellness for further treatment if the twice a day is not sufficient

## 2022-08-02 ENCOUNTER — OFFICE VISIT (OUTPATIENT)
Dept: INTERNAL MEDICINE CLINIC | Facility: CLINIC | Age: 37
End: 2022-08-02
Payer: COMMERCIAL

## 2022-08-02 ENCOUNTER — APPOINTMENT (OUTPATIENT)
Dept: RADIOLOGY | Facility: CLINIC | Age: 37
End: 2022-08-02
Payer: COMMERCIAL

## 2022-08-02 ENCOUNTER — NURSE TRIAGE (OUTPATIENT)
Dept: OTHER | Facility: OTHER | Age: 37
End: 2022-08-02

## 2022-08-02 VITALS
SYSTOLIC BLOOD PRESSURE: 108 MMHG | HEART RATE: 88 BPM | OXYGEN SATURATION: 99 % | BODY MASS INDEX: 18.54 KG/M2 | HEIGHT: 69 IN | DIASTOLIC BLOOD PRESSURE: 62 MMHG | WEIGHT: 125.2 LBS | RESPIRATION RATE: 16 BRPM | TEMPERATURE: 98.4 F

## 2022-08-02 DIAGNOSIS — Z86.79: ICD-10-CM

## 2022-08-02 DIAGNOSIS — G40.909 SEIZURE DISORDER (HCC): ICD-10-CM

## 2022-08-02 DIAGNOSIS — Z30.41 ENCOUNTER FOR SURVEILLANCE OF CONTRACEPTIVE PILLS: ICD-10-CM

## 2022-08-02 DIAGNOSIS — M25.532 LEFT WRIST PAIN: ICD-10-CM

## 2022-08-02 DIAGNOSIS — F41.1 GAD (GENERALIZED ANXIETY DISORDER): ICD-10-CM

## 2022-08-02 DIAGNOSIS — M25.532 LEFT WRIST PAIN: Primary | ICD-10-CM

## 2022-08-02 PROCEDURE — 99214 OFFICE O/P EST MOD 30 MIN: CPT | Performed by: INTERNAL MEDICINE

## 2022-08-02 PROCEDURE — 73110 X-RAY EXAM OF WRIST: CPT

## 2022-08-02 RX ORDER — RIFAXIMIN 550 MG/1
TABLET ORAL AS NEEDED
COMMUNITY
Start: 2022-07-15 | End: 2022-09-28

## 2022-08-02 RX ORDER — NORETHINDRONE ACETATE AND ETHINYL ESTRADIOL 1MG-20(21)
1 KIT ORAL DAILY
Qty: 90 TABLET | Refills: 1 | Status: SHIPPED | OUTPATIENT
Start: 2022-08-02 | End: 2022-10-14 | Stop reason: SDUPTHER

## 2022-08-02 NOTE — PROGRESS NOTES
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  -     Ambulatory Referral to Neurology; Future    SHASHI (generalized anxiety disorder)  -     Ambulatory Referral to Neurology; Future    Other orders  -     Xifaxan 550 MG tablet; TAKE 1 TABLET BY MOUTH THREE TIMES DAILY FOR 14 DAYS            Patient Instructions   Left wrist pain and bony deformity-check labs as ordered in June and x-ray and will follow accordingly  Continue ibuprofen as needed  Anxiety with history of questionable absent seizure as well as history of AVM-referral to Neurology  Check EEG  Obtain old records for review  Continue present regimen for now  Subjective:      Patient ID: Kyleigh Xie is a 39 y o  female    Patient presents acutely with complaints of worsening left wrist pain  She notes pain has been present for about 5 months  She has bony growth on the distal ulna, similar to on the right side  She notes she had torn ligament on the right side approximately 20 years ago  She was diagnosed with rheumatoid arthritis in her knees approximately 20 years ago  She is not aware of any acute injury to the left wrist but is limited in activities due to pain  She is left-hand dominant and has 6month-old son that she needs to carry  She has been using ibuprofen with some relief  She notes long history of anxiety  She states she has tried multiple SSRIs but was taken off of them because of seizures which she describes as day dreaming  She had been on Valium 10 mg every 4-6 hours as needed but recently changed to clonazepam and feels that 2 mg twice a day is not sufficient  She notes history of cerebral AVM treated at [facility] by Dr Sola Richards    Review of records has no notes or imaging  Current Outpatient Medications:     clonazePAM (KlonoPIN) 2 mg tablet, Take 1 tablet (2 mg total) by mouth 2 (two) times a day, Disp: 60 tablet, Rfl: 0    norethindrone-ethinyl estradiol (Junel FE 1/20) 1-20 MG-MCG per tablet, Take 1 tablet by mouth daily, Disp: 90 tablet, Rfl: 1    Xifaxan 550 MG tablet, TAKE 1 TABLET BY MOUTH THREE TIMES DAILY FOR 14 DAYS, Disp: , Rfl:     ibuprofen (MOTRIN) 800 mg tablet, Take 1 tablet (800 mg total) by mouth every 8 (eight) hours as needed for mild pain (Patient not taking: Reported on 8/2/2022), Disp: 60 tablet, Rfl: 0    Nutritional Supplements (Ensure), Take 250 mL by mouth 3 (three) times a day, Disp: 70347 mL, Rfl: 0    TiZANidine (ZANAFLEX) 4 MG capsule, Take 4 mg by mouth 3 (three) times a day as needed (Patient not taking: Reported on 8/2/2022), Disp: , Rfl:     No results found for this or any previous visit (from the past 1008 hour(s))  The following portions of the patient's history were reviewed and updated as appropriate: allergies, current medications, past family history, past medical history, past social history, past surgical history and problem list      Review of Systems   Constitutional: Negative for appetite change, chills, diaphoresis, fatigue, fever and unexpected weight change  HENT: Negative for congestion, hearing loss and rhinorrhea  Eyes: Negative for visual disturbance  Respiratory: Negative for cough, chest tightness, shortness of breath and wheezing  Cardiovascular: Negative for chest pain, palpitations and leg swelling  Gastrointestinal: Negative for abdominal pain and blood in stool  Endocrine: Negative for cold intolerance, heat intolerance, polydipsia and polyuria  Genitourinary: Negative for difficulty urinating, dysuria, frequency and urgency  Musculoskeletal: Positive for arthralgias  Negative for myalgias  Skin: Negative for rash     Neurological: Negative for dizziness, weakness, light-headedness and headaches  Hematological: Does not bruise/bleed easily  Psychiatric/Behavioral: Negative for dysphoric mood and sleep disturbance  Objective:      Vitals:    08/02/22 1024   BP: 108/62   Pulse: 88   Resp: 16   Temp: 98 4 °F (36 9 °C)   SpO2: 99%          Physical Exam  Constitutional:       Appearance: She is well-developed  HENT:      Head: Normocephalic and atraumatic  Pulmonary:      Effort: Pulmonary effort is normal    Musculoskeletal:      Comments: Bilateral bony prominence on distal ulna by styloid process, left greater than right with associated tenderness to palpation  No palpable synovitis  No evidence of ulnar deviation  Neurological:      Mental Status: She is alert and oriented to person, place, and time  Psychiatric:         Behavior: Behavior normal  Behavior is cooperative  Thought Content:  Thought content normal          Judgment: Judgment normal

## 2022-08-02 NOTE — TELEPHONE ENCOUNTER
Regarding: Wrist injury   ----- Message from Karolina Waite RN sent at 8/2/2022  8:40 AM EDT -----  "I hurt my left wrist  It's been a few months and the swelling hasn't gone down "

## 2022-08-02 NOTE — TELEPHONE ENCOUNTER
Patient calling in stating that she injured her wrist about 6 months ago and was seen in the office last month for this but it has not been getting any better  She states she still has swelling and her pain is 10/10 despite using ice and taking tylenol and ibuprofen  She would like to see a different doctor today in the office  Appt scheduled for today at 10:30 with Dr Jose David Alvarado  Reason for Disposition   SEVERE pain (e g , excruciating)    Answer Assessment - Initial Assessment Questions  1  MECHANISM: "How did the injury happen?"      Unsure     2  ONSET: "When did the injury happen?" (Minutes or hours ago)       About 6 months ago     3  APPEARANCE of INJURY: "What does the injury look like?"       Swollen and red     4  SEVERITY: "Can you use the hand normally?" "Can you bend your fingers into a ball and then fully open them?"      Can bend fingers and move it but has difficulty using the wrist bone      5  SIZE: For cuts, bruises, or swelling, ask: "How large is it?" (e g , inches or centimeters;  entire hand or wrist)       No open areas     6  PAIN: "Is there pain?" If Yes, ask: "How bad is the pain?"  (Scale 1-10; or mild, moderate, severe)      Yes 10/10 pain     7  TETANUS: For any breaks in the skin, ask: "When was the last tetanus booster?"      No breaks in the skin     8  OTHER SYMPTOMS: "Do you have any other symptoms?"       Denies     9   PREGNANCY: "Is there any chance you are pregnant?" "When was your last menstrual period?"      No    Protocols used: HAND AND WRIST INJURY-ADULT-OH

## 2022-08-02 NOTE — TELEPHONE ENCOUNTER
Medication Refill Request     Name norethindrone-ethinyl estradiol (Junel FE 1/20) 1-20 MG-MCG per tablet       Dose/Frequency 1 tablet daily   Quantity 90 days with 1 refill  Verified pharmacy   [x]  Verified ordering Provider   [x]  Does patient have enough for the next 3 days? Yes [] No [x]      Patient also asked if she has labs ordered from her previous appointment  Patient was informed they are ordered and she can go to any [facility] to have them done

## 2022-08-02 NOTE — PATIENT INSTRUCTIONS
Left wrist pain and bony deformity-check labs as ordered in June and x-ray and will follow accordingly  Continue ibuprofen as needed  Anxiety with history of questionable absent seizure as well as history of AVM-referral to Neurology  Check EEG  Obtain old records for review  Continue present regimen for now

## 2022-08-03 ENCOUNTER — TELEPHONE (OUTPATIENT)
Dept: INTERNAL MEDICINE CLINIC | Facility: CLINIC | Age: 37
End: 2022-08-03

## 2022-08-03 NOTE — TELEPHONE ENCOUNTER
Received request for medical records to be sent to Dr Alfreda Kearns      Faxed on 8-3-22 to [facility] phone [phone]

## 2022-08-05 ENCOUNTER — APPOINTMENT (OUTPATIENT)
Dept: LAB | Facility: CLINIC | Age: 37
End: 2022-08-05
Payer: COMMERCIAL

## 2022-08-05 ENCOUNTER — TELEPHONE (OUTPATIENT)
Dept: INTERNAL MEDICINE CLINIC | Facility: CLINIC | Age: 37
End: 2022-08-05

## 2022-08-05 NOTE — TELEPHONE ENCOUNTER
Pt was a dr reyna pt, was scheduled twice not by reception    wants to see dr Federico Crystal only now    I guess she is now his pt?

## 2022-08-09 ENCOUNTER — OFFICE VISIT (OUTPATIENT)
Dept: INTERNAL MEDICINE CLINIC | Facility: CLINIC | Age: 37
End: 2022-08-09
Payer: COMMERCIAL

## 2022-08-09 VITALS
WEIGHT: 127.4 LBS | HEART RATE: 86 BPM | TEMPERATURE: 97.7 F | SYSTOLIC BLOOD PRESSURE: 106 MMHG | BODY MASS INDEX: 19.31 KG/M2 | RESPIRATION RATE: 17 BRPM | OXYGEN SATURATION: 98 % | DIASTOLIC BLOOD PRESSURE: 70 MMHG | HEIGHT: 68 IN

## 2022-08-09 DIAGNOSIS — S62.653D CLOSED NONDISPLACED FRACTURE OF MIDDLE PHALANX OF LEFT MIDDLE FINGER WITH ROUTINE HEALING, SUBSEQUENT ENCOUNTER: Primary | ICD-10-CM

## 2022-08-09 PROBLEM — Z72.0 NICOTINE ABUSE: Status: ACTIVE | Noted: 2019-10-11

## 2022-08-09 PROBLEM — S62.607D: Status: ACTIVE | Noted: 2022-08-09

## 2022-08-09 PROBLEM — S62.603D: Status: ACTIVE | Noted: 2022-08-09

## 2022-08-09 PROCEDURE — 99214 OFFICE O/P EST MOD 30 MIN: CPT | Performed by: NURSE PRACTITIONER

## 2022-08-09 RX ORDER — OXYCODONE HYDROCHLORIDE 5 MG/1
5 TABLET ORAL EVERY 6 HOURS PRN
Qty: 20 TABLET | Refills: 0 | Status: SHIPPED | OUTPATIENT
Start: 2022-08-09 | End: 2022-08-19 | Stop reason: SDUPTHER

## 2022-08-09 RX ORDER — OXYCODONE HYDROCHLORIDE 5 MG/1
5 TABLET ORAL EVERY 6 HOURS PRN
Qty: 20 TABLET | Refills: 0 | Status: SHIPPED | OUTPATIENT
Start: 2022-08-09 | End: 2022-08-09

## 2022-08-09 NOTE — ASSESSMENT & PLAN NOTE
IMPRESSION: Intra-articular fracture left fifth middle phalanx  Lucency in the   proximal metaphysis at the level of the fracture, possible enchondroma  Patient was recently evaluated in the emergency room at [facility] after sustaining a fall  An x-ray did show an intra-articular fracture of the left middle finger  She was given oxycodone 5 mg 5 tablets yesterday  She is not taking anything else for pain  She does have an appointment with an orthopedic surgeon on Friday  She did see an orthopedic surgeon yesterday however they did not participate with her insurance  The patient is on Klonopin  I did have discussion with the patient regarding the importance of not taking the oxycodone and Klonopin at the same time  I did send the oxycodone  5 mg 20 tablets to her pharmacy  She is aware that any future prescription should be obtained from the orthopedic surgeon  Controlled Substance Review    [place] PDMP or [place]  reviewed: No red flags were identified; safe to proceed with prescription  There are risks associated with opioid medications, including dependence, addiction and tolerance  The patient understands and agrees to use these medications only as prescribed  Potential side effects of the medications include, but are not limited to, constipation, drowsiness, addiction, impaired judgment and risk of fatal overdose if not taken as prescribed  The patient was warned against driving while taking sedation medications  Sharing medications is a felony  At this point in time, the patient is showing no signs of addiction, abuse, diversion or suicidal ideation  Baron Donovan

## 2022-08-09 NOTE — PROGRESS NOTES
[facility] - [facility]    NAME: Lady Barton  AGE: 39 y o  SEX: female  : 1985     DATE: 2022     Assessment and Plan:     Problem List Items Addressed This Visit        Musculoskeletal and Integument    Closed nondisplaced fracture of phalanx of left middle finger with routine healing - Primary     IMPRESSION: Intra-articular fracture left fifth middle phalanx  Lucency in the   proximal metaphysis at the level of the fracture, possible enchondroma  Patient was recently evaluated in the emergency room at [facility] after sustaining a fall  An x-ray did show an intra-articular fracture of the left middle finger  She was given oxycodone 5 mg 5 tablets yesterday  She is not taking anything else for pain  She does have an appointment with an orthopedic surgeon on Friday  She did see an orthopedic surgeon yesterday however they did not participate with her insurance  The patient is on Klonopin  I did have discussion with the patient regarding the importance of not taking the oxycodone and Klonopin at the same time  I did send the oxycodone  5 mg 20 tablets to her pharmacy  She is aware that any future prescription should be obtained from the orthopedic surgeon  Controlled Substance Review    [place] PDMP or [place]  reviewed: No red flags were identified; safe to proceed with prescription  There are risks associated with opioid medications, including dependence, addiction and tolerance  The patient understands and agrees to use these medications only as prescribed  Potential side effects of the medications include, but are not limited to, constipation, drowsiness, addiction, impaired judgment and risk of fatal overdose if not taken as prescribed  The patient was warned against driving while taking sedation medications  Sharing medications is a felony   At this point in time, the patient is showing no signs of addiction, abuse, diversion or suicidal ideation                  Relevant Medications    oxyCODONE (Roxicodone) 5 immediate release tablet            Tobacco Cessation Counseling: Tobacco cessation counseling was not provided  The patient is sincerely urged to quit consumption of tobacco  She is not ready to quit tobacco         No follow-ups on file  Chief Complaint:     Chief Complaint   Patient presents with    Follow-up     Broken finger left hand        History of Present Illness: The patient presents to the office today for follow-up  She recently was seen in the emergency room after falling and sustaining a fracture of her left middle finger  She is in need of more pain medication  She does have an orthopedic surgery appointment on Friday  This was supplied to the patient  Risks and benefits of the oxycodone was discussed with the patient  She has been advised that she should not be taking the oxycodone at the same time as her Klonopin  Review of Systems:     Review of Systems   Constitutional: Negative for activity change, fatigue and fever  HENT: Negative for congestion, hearing loss, rhinorrhea, trouble swallowing and voice change  Eyes: Negative for photophobia, pain, discharge and visual disturbance  Respiratory: Negative for cough, chest tightness and shortness of breath  Cardiovascular: Negative for chest pain, palpitations and leg swelling  Gastrointestinal: Negative for abdominal pain, blood in stool, constipation, nausea and vomiting  Endocrine: Negative for cold intolerance and heat intolerance  Genitourinary: Negative for difficulty urinating, frequency, hematuria, urgency, vaginal bleeding and vaginal discharge  Musculoskeletal: Positive for joint swelling (left middle finger)  Negative for arthralgias and myalgias  Skin: Negative  Neurological: Negative for dizziness, weakness, numbness and headaches  Psychiatric/Behavioral: Negative for decreased concentration   The patient is not nervous/anxious  Problem List:     Patient Active Problem List   Diagnosis    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    SHASHI (generalized anxiety disorder)    Recent unexplained weight loss    Left wrist pain    Bipolar 1 disorder (HCC)    Nicotine abuse    Closed nondisplaced fracture of phalanx of left middle finger with routine healing        Objective:     /70 (BP Location: Left arm, Patient Position: Sitting, Cuff Size: Standard)   Pulse 86   Temp 97 7 °F (36 5 °C) (Temporal)   Resp 17   Ht 5' 8" (1 727 m)   Wt 57 8 kg (127 lb 6 4 oz)   SpO2 98%   BMI 19 37 kg/m²     Physical Exam  Vitals reviewed  Constitutional:       Appearance: Normal appearance  She is obese  HENT:      Head: Normocephalic  Nose: Nose normal       Mouth/Throat:      Mouth: Mucous membranes are moist       Pharynx: Oropharynx is clear  Eyes:      Extraocular Movements: Extraocular movements intact  Pupils: Pupils are equal, round, and reactive to light  Cardiovascular:      Rate and Rhythm: Normal rate and regular rhythm  Pulmonary:      Effort: Pulmonary effort is normal       Breath sounds: Normal breath sounds  Musculoskeletal:      Left hand: Swelling and tenderness present  Decreased range of motion  Comments: Left middle finger fracture  Splint in place   Skin:     General: Skin is warm and dry  Neurological:      General: No focal deficit present  Mental Status: She is alert and oriented to person, place, and time  Psychiatric:         Mood and Affect: Mood normal          Behavior: Behavior normal          Thought Content:  Thought content normal          Judgment: Judgment normal          Kiana Almaraz, [address] [place] [address]

## 2022-08-12 ENCOUNTER — TELEPHONE (OUTPATIENT)
Dept: INTERNAL MEDICINE CLINIC | Facility: CLINIC | Age: 37
End: 2022-08-12

## 2022-08-12 NOTE — TELEPHONE ENCOUNTER
PT  ASKING  FOR REFILL  ON  RX    OXYCODONE 5 MG    PT   CAN'T   SEE  HAND  DR Yee Griffin 9/20  PT  SAW  ORTH  DR OCASIO   BUT  HE  CAN'T HELP   HER  NEEDS   HAND      TOLD  SHE  HAS  TUMOR  IN  HER  SMALL  FINGER

## 2022-08-19 ENCOUNTER — OFFICE VISIT (OUTPATIENT)
Dept: INTERNAL MEDICINE CLINIC | Facility: CLINIC | Age: 37
End: 2022-08-19
Payer: COMMERCIAL

## 2022-08-19 VITALS
HEART RATE: 83 BPM | DIASTOLIC BLOOD PRESSURE: 70 MMHG | HEIGHT: 68 IN | OXYGEN SATURATION: 99 % | SYSTOLIC BLOOD PRESSURE: 106 MMHG | WEIGHT: 124.6 LBS | TEMPERATURE: 97.2 F | BODY MASS INDEX: 18.88 KG/M2

## 2022-08-19 DIAGNOSIS — S62.653D CLOSED NONDISPLACED FRACTURE OF MIDDLE PHALANX OF LEFT MIDDLE FINGER WITH ROUTINE HEALING, SUBSEQUENT ENCOUNTER: ICD-10-CM

## 2022-08-19 DIAGNOSIS — S62.653D CLOSED NONDISPLACED FRACTURE OF MIDDLE PHALANX OF LEFT MIDDLE FINGER WITH ROUTINE HEALING, SUBSEQUENT ENCOUNTER: Primary | ICD-10-CM

## 2022-08-19 DIAGNOSIS — F41.1 GAD (GENERALIZED ANXIETY DISORDER): ICD-10-CM

## 2022-08-19 PROCEDURE — 99214 OFFICE O/P EST MOD 30 MIN: CPT | Performed by: FAMILY MEDICINE

## 2022-08-19 RX ORDER — OXYCODONE HYDROCHLORIDE 5 MG/1
5 TABLET ORAL EVERY 6 HOURS PRN
Qty: 40 TABLET | Refills: 0 | Status: SHIPPED | OUTPATIENT
Start: 2022-08-19 | End: 2022-08-30 | Stop reason: SDUPTHER

## 2022-08-19 RX ORDER — CLONAZEPAM 2 MG/1
2 TABLET ORAL 2 TIMES DAILY
Qty: 60 TABLET | Refills: 0 | Status: SHIPPED | OUTPATIENT
Start: 2022-08-19 | End: 2022-09-19

## 2022-08-19 NOTE — PROGRESS NOTES
FOLLOW-UP OFFICE VISIT  [facility] - [facility] OF [facility]    NAME: Shawna Goldstein  AGE: 39 y o  SEX: female  : 1985     DATE: 2022     Assessment and Plan:     Problem List Items Addressed This Visit        Musculoskeletal and Integument    Closed nondisplaced fracture of phalanx of left middle finger with routine healing - Primary    Relevant Orders    Ambulatory Referral to Pain Management       Other    SHASHI (generalized anxiety disorder)    Relevant Medications    clonazePAM (KlonoPIN) 2 mg tablet            refill of anxiolytic provided  10 days supply of oxycodone 5mg q6hr prn provided  Referral provided  Return in about 4 months (around 2022) for Annual physical      Chief Complaint:     Chief Complaint   Patient presents with    Medication Refill        History of Present Illness:     Presents for medicaiton refill  Needs her klonopin and oxycodone   her hand  Using oxycodone for pain relief  Doesn't follow up with pain management until the   Does have a pain agreement  Need a referral to her pain management provider as well  Review of Systems:     Review of Systems     Problem List:     Patient Active Problem List   Diagnosis    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    SHASHI (generalized anxiety disorder)    Recent unexplained weight loss    Left wrist pain    Bipolar 1 disorder (HCC)    Nicotine abuse    Closed nondisplaced fracture of phalanx of left middle finger with routine healing        Objective:     /70   Pulse 83   Temp (!) 97 2 °F (36 2 °C)   Ht 5' 8" (1 727 m)   Wt 56 5 kg (124 lb 9 6 oz)   SpO2 99%   BMI 18 95 kg/m²     Physical Exam  HENT:      Head: Normocephalic and atraumatic  Eyes:      Conjunctiva/sclera: Conjunctivae normal    Cardiovascular:      Rate and Rhythm: Normal rate     Pulmonary:      Effort: Pulmonary effort is normal    Musculoskeletal:         General: Deformity (left hand; last digitis malaligned and tender to touch) present  Neurological:      Mental Status: She is alert and oriented to person, place, and time  Gait: Gait normal               Pertinent Laboratory/Diagnostic Studies:      Radiology/Other Diagnostic Testing Results: I have personally reviewed pertinent reports          Homer CHEUNG HSPTLLilton Jose C [facility]  8/19/2022 11:00 AM

## 2022-08-24 ENCOUNTER — TELEPHONE (OUTPATIENT)
Dept: OBGYN CLINIC | Facility: HOSPITAL | Age: 37
End: 2022-08-24

## 2022-08-24 NOTE — TELEPHONE ENCOUNTER
Patient has been seen in the ED on 08/08/22 @ [facility] - ,L Hand - Pinky is broken in 2 places, tumor on hand, patient fell on 08/07  Please was told she has to have surgery      Please Advise: [phone]

## 2022-08-29 DIAGNOSIS — S62.653D CLOSED NONDISPLACED FRACTURE OF MIDDLE PHALANX OF LEFT MIDDLE FINGER WITH ROUTINE HEALING, SUBSEQUENT ENCOUNTER: ICD-10-CM

## 2022-08-30 ENCOUNTER — OFFICE VISIT (OUTPATIENT)
Dept: INTERNAL MEDICINE CLINIC | Facility: CLINIC | Age: 37
End: 2022-08-30
Payer: COMMERCIAL

## 2022-08-30 VITALS
OXYGEN SATURATION: 97 % | BODY MASS INDEX: 18.04 KG/M2 | HEIGHT: 70 IN | RESPIRATION RATE: 20 BRPM | TEMPERATURE: 97.6 F | HEART RATE: 80 BPM | DIASTOLIC BLOOD PRESSURE: 70 MMHG | WEIGHT: 126 LBS | SYSTOLIC BLOOD PRESSURE: 100 MMHG

## 2022-08-30 DIAGNOSIS — S89.92XD INJURY OF LEFT KNEE, SUBSEQUENT ENCOUNTER: ICD-10-CM

## 2022-08-30 DIAGNOSIS — M25.561 CHRONIC PAIN OF RIGHT KNEE: Primary | ICD-10-CM

## 2022-08-30 DIAGNOSIS — G89.29 CHRONIC PAIN OF RIGHT KNEE: Primary | ICD-10-CM

## 2022-08-30 DIAGNOSIS — S62.653D CLOSED NONDISPLACED FRACTURE OF MIDDLE PHALANX OF LEFT MIDDLE FINGER WITH ROUTINE HEALING, SUBSEQUENT ENCOUNTER: ICD-10-CM

## 2022-08-30 DIAGNOSIS — M25.532 LEFT WRIST PAIN: ICD-10-CM

## 2022-08-30 PROCEDURE — 99214 OFFICE O/P EST MOD 30 MIN: CPT | Performed by: FAMILY MEDICINE

## 2022-08-30 PROCEDURE — 3725F SCREEN DEPRESSION PERFORMED: CPT | Performed by: FAMILY MEDICINE

## 2022-08-30 RX ORDER — OXYCODONE HYDROCHLORIDE 5 MG/1
5 TABLET ORAL EVERY 6 HOURS PRN
Qty: 12 TABLET | Refills: 0 | Status: SHIPPED | OUTPATIENT
Start: 2022-08-30 | End: 2022-09-02

## 2022-08-30 RX ORDER — OXYCODONE HYDROCHLORIDE 5 MG/1
5 TABLET ORAL EVERY 6 HOURS PRN
Qty: 40 TABLET | Refills: 0 | OUTPATIENT
Start: 2022-08-30

## 2022-08-30 NOTE — PROGRESS NOTES
FOLLOW-UP OFFICE VISIT  [facility] - [facility]ICAL [facility]OC[facility]ATES OF [facility]    NAME: Sheila Dickens  AGE: 39 y o  SEX: female  : 1985     DATE: 2022     Assessment and Plan:     Problem List Items Addressed This Visit        Musculoskeletal and Integument    Closed nondisplaced fracture of phalanx of left middle finger with routine healing    Relevant Medications    oxyCODONE (Roxicodone) 5 immediate release tablet       Other    Left wrist pain    Relevant Orders    MRI wrist left wo contrast      Other Visit Diagnoses     Chronic pain of right knee    -  Primary    Injury of left knee, subsequent encounter        Relevant Orders    MRI knee left  wo contrast            refill oxycodone 5 mg q 6 hours x3 days  Will not provide oxycodone after procedure  Patient will have to follow pain management at that time  MRI ordered for persistent pain and instability after fall  Chief Complaint:     Chief Complaint   Patient presents with    Physical Exam     Pt states that from the last time that she was here her hand and knee hurts         History of Present Illness:     Continued knee pain and wrist pain since the fall  Reports instability  History of multiple surgeries on her knee  She is concerned for ACL re-inury  Requesting MRI  Had multiple x-rays at time of fall that were negative  Recently saw hand surgeon for the 5th digit on the left hand  Surgery scheduled for Friday  Requesting oxycodone up until then  Review of Systems:     Review of Systems   Musculoskeletal: Positive for arthralgias and gait problem          Problem List:     Patient Active Problem List   Diagnosis    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    SHASHI (generalized anxiety disorder)    Recent unexplained weight loss    Left wrist pain    Bipolar 1 disorder (HCC)    Nicotine abuse    Closed nondisplaced fracture of phalanx of left middle finger with routine healing Objective:     /70 (BP Location: Left arm, Patient Position: Sitting, Cuff Size: Standard)   Pulse 80   Temp 97 6 °F (36 4 °C) (Temporal)   Resp 20   Ht 5' 10" (1 778 m)   Wt 57 2 kg (126 lb)   SpO2 97%   BMI 18 08 kg/m²     Physical Exam  HENT:      Head: Normocephalic and atraumatic  Cardiovascular:      Rate and Rhythm: Normal rate  Pulmonary:      Effort: Pulmonary effort is normal    Musculoskeletal:         General: Tenderness present  No swelling  Neurological:      Mental Status: She is alert and oriented to person, place, and time                 Sondra CHEUNG HSPTLJule [facility]  8/30/2022 11:32 AM

## 2022-09-01 ENCOUNTER — OFFICE VISIT (OUTPATIENT)
Dept: INTERNAL MEDICINE CLINIC | Facility: CLINIC | Age: 37
End: 2022-09-01
Payer: COMMERCIAL

## 2022-09-01 ENCOUNTER — TELEPHONE (OUTPATIENT)
Dept: INTERNAL MEDICINE CLINIC | Facility: CLINIC | Age: 37
End: 2022-09-01

## 2022-09-01 VITALS
HEART RATE: 88 BPM | WEIGHT: 123 LBS | RESPIRATION RATE: 18 BRPM | OXYGEN SATURATION: 98 % | SYSTOLIC BLOOD PRESSURE: 112 MMHG | BODY MASS INDEX: 17.65 KG/M2 | TEMPERATURE: 97.6 F | DIASTOLIC BLOOD PRESSURE: 80 MMHG

## 2022-09-01 DIAGNOSIS — S62.653D CLOSED NONDISPLACED FRACTURE OF MIDDLE PHALANX OF LEFT MIDDLE FINGER WITH ROUTINE HEALING, SUBSEQUENT ENCOUNTER: Primary | ICD-10-CM

## 2022-09-01 DIAGNOSIS — F41.1 GAD (GENERALIZED ANXIETY DISORDER): ICD-10-CM

## 2022-09-01 PROCEDURE — 99213 OFFICE O/P EST LOW 20 MIN: CPT | Performed by: FAMILY MEDICINE

## 2022-09-01 RX ORDER — VENLAFAXINE HYDROCHLORIDE 37.5 MG/1
37.5 CAPSULE, EXTENDED RELEASE ORAL
Qty: 90 CAPSULE | Refills: 0 | Status: SHIPPED | OUTPATIENT
Start: 2022-09-01

## 2022-09-01 RX ORDER — TRAMADOL HYDROCHLORIDE 100 MG/1
1 TABLET, COATED ORAL 2 TIMES DAILY PRN
Qty: 60 TABLET | Refills: 0 | Status: SHIPPED | OUTPATIENT
Start: 2022-09-01 | End: 2022-09-28

## 2022-09-01 NOTE — PROGRESS NOTES
FOLLOW-UP OFFICE VISIT  [facility] - [facility] OF [facility]    NAME: Philip Jalloh  AGE: 39 y o  SEX: female  : 1985     DATE: 2022     Assessment and Plan:     Problem List Items Addressed This Visit        Musculoskeletal and Integument    Closed nondisplaced fracture of phalanx of left middle finger with routine healing - Primary    Relevant Medications    traMADol HCl 100 MG TABS    Other Relevant Orders    Ambulatory Referral to Hand Surgery       Other    SHASHI (generalized anxiety disorder)    Relevant Medications    venlafaxine (EFFEXOR-XR) 37 5 mg 24 hr capsule        Pt and due to the situation with her hand and wrist her anxiety has worsened  Denies having increased on the Klonopin  Recommended patient start SSRI instead  The mandible  Will refer to a different hand surgeon for 2nd opinion and additional follow-up  Patient to try tramadol  Will not refill oxycodone  Chief Complaint:     Chief Complaint   Patient presents with    Physical Exam     Pt states that her surgery was canceled and her left hand continues to hurt and she is very upset still not sleeping and no appetite doesn't know what else to do        History of Present Illness:   Pt reports that her hand surgery was cancelled  Not sure why  She is trying to follow up with a different orthopedic doctor that is covered by her insurance  Requesting refill of pian medicine  Review of Systems:     Review of Systems   Musculoskeletal: Positive for arthralgias          Problem List:     Patient Active Problem List   Diagnosis    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    SHASHI (generalized anxiety disorder)    Recent unexplained weight loss    Left wrist pain    Bipolar 1 disorder (HCC)    Nicotine abuse    Closed nondisplaced fracture of phalanx of left middle finger with routine healing        Objective:     /80 (BP Location: Left arm, Patient Position: Sitting, Cuff Size: Standard)   Pulse 88   Temp 97 6 °F (36 4 °C) (Temporal)   Resp 18   Wt 55 8 kg (123 lb)   SpO2 98%   BMI 17 65 kg/m²     Physical Exam  HENT:      Head: Normocephalic and atraumatic  Cardiovascular:      Rate and Rhythm: Normal rate  Pulmonary:      Effort: Pulmonary effort is normal    Musculoskeletal:         General: Deformity present  Neurological:      Mental Status: She is alert and oriented to person, place, and time                 Jama Hearn [facility]  9/6/2022 2:10 PM

## 2022-09-01 NOTE — TELEPHONE ENCOUNTER
Pt needs prior auth for her Tramadol through her insurance  Dena would be handling the prior auth     OR    Does Dr Cowan Media want her to go back to taking the Oxycodone? Please let her know    URGENT- Can this be taken care of today?

## 2022-09-01 NOTE — TELEPHONE ENCOUNTER
Spoke to pt she has another provider she is hoping that she can see otherwise she may change her insurance and try to follow through with Dr Martha Garcia, her original provider

## 2022-09-02 ENCOUNTER — TELEPHONE (OUTPATIENT)
Dept: OBGYN CLINIC | Facility: HOSPITAL | Age: 37
End: 2022-09-02

## 2022-09-02 NOTE — TELEPHONE ENCOUNTER
Per the previous phone note patient was advised that Dr Bran Rodriguez is not able to see her  Patient states that she does have an appt on 9/7/22 but states it is 3 hours away

## 2022-09-02 NOTE — TELEPHONE ENCOUNTER
PA has been started for the patients traMADol HCl 100MG tablets   , patients key is [id number]   Paperwork has been printed and will be signed by, Marcela Ortega and then faxed to [facility] for authorization

## 2022-09-02 NOTE — TELEPHONE ENCOUNTER
Hello,  Please advise if the following patient can be forced onto the schedule:  Patient:  Shawna Goldstein  :  [birth year]  MRN:  [id number]  INSURANCE:  [employer]  Call back #: [phone]  Reason for appointment:  Lt hand 3rd digit fx  States she has xrays & that it is bad  She states that she went to several doctors but they kicked her out because they could not help her  Patient was told the Dr Leda Graff in [facility] refused her sx today because she has pins in this finger but patient states that she does not  Patient also states that she has 2 sets of xrays  Requested doctor/location:  Patient lives in [place] & will go anywhere  Thank you in advance!       Dr Octaviano Aj

## 2022-09-16 DIAGNOSIS — F41.1 GAD (GENERALIZED ANXIETY DISORDER): ICD-10-CM

## 2022-09-19 DIAGNOSIS — F41.1 GAD (GENERALIZED ANXIETY DISORDER): ICD-10-CM

## 2022-09-19 RX ORDER — CLONAZEPAM 2 MG/1
2 TABLET ORAL 2 TIMES DAILY
Qty: 60 TABLET | Refills: 0 | OUTPATIENT
Start: 2022-09-19 | End: 2022-10-19

## 2022-09-19 RX ORDER — CLONAZEPAM 2 MG/1
2 TABLET ORAL 2 TIMES DAILY
Qty: 60 TABLET | Refills: 0 | Status: SHIPPED | OUTPATIENT
Start: 2022-09-19 | End: 2022-10-14 | Stop reason: SDUPTHER

## 2022-09-22 ENCOUNTER — HOSPITAL ENCOUNTER (OUTPATIENT)
Dept: MRI IMAGING | Facility: CLINIC | Age: 37
Discharge: HOME | End: 2022-09-22
Payer: COMMERCIAL

## 2022-09-22 DIAGNOSIS — S89.92XD INJURY OF LEFT KNEE, SUBSEQUENT ENCOUNTER: ICD-10-CM

## 2022-09-22 DIAGNOSIS — M25.532 LEFT WRIST PAIN: ICD-10-CM

## 2022-09-22 PROCEDURE — 73721 MRI JNT OF LWR EXTRE W/O DYE: CPT

## 2022-09-22 PROCEDURE — 73221 MRI JOINT UPR EXTREM W/O DYE: CPT

## 2022-09-26 ENCOUNTER — TELEPHONE (OUTPATIENT)
Dept: INTERNAL MEDICINE CLINIC | Facility: CLINIC | Age: 37
End: 2022-09-26

## 2022-09-26 NOTE — TELEPHONE ENCOUNTER
----- Message from Erich Shaw DO sent at 9/26/2022  4:37 PM EDT -----  MRI of the knee shows known ACL repair, some tearing of the medical mensicus and thinning of the cartilage  This may or may not be a surgical knee  I recommend following up with orthopedics

## 2022-09-26 NOTE — TELEPHONE ENCOUNTER
Pt was trying to get an appt with Dr Rosalba Gupta today    Needed a referral for ortho; needs a different pain medication    No appts left today- she was told to call at 7:30AM; checking on cancellations to get in that day

## 2022-09-27 ENCOUNTER — OFFICE VISIT (OUTPATIENT)
Dept: OBGYN CLINIC | Facility: CLINIC | Age: 37
End: 2022-09-27
Payer: COMMERCIAL

## 2022-09-27 VITALS
DIASTOLIC BLOOD PRESSURE: 68 MMHG | WEIGHT: 120 LBS | HEIGHT: 70 IN | BODY MASS INDEX: 17.18 KG/M2 | HEART RATE: 106 BPM | SYSTOLIC BLOOD PRESSURE: 100 MMHG

## 2022-09-27 DIAGNOSIS — S83.232A COMPLEX TEAR OF MEDIAL MENISCUS OF LEFT KNEE AS CURRENT INJURY, INITIAL ENCOUNTER: Primary | ICD-10-CM

## 2022-09-27 PROCEDURE — 99203 OFFICE O/P NEW LOW 30 MIN: CPT | Performed by: FAMILY MEDICINE

## 2022-09-27 NOTE — PROGRESS NOTES
Assessment/Plan:  Assessment/Plan   Diagnoses and all orders for this visit:    Complex tear of medial meniscus of left knee as current injury, initial encounter  -     Ambulatory Referral to Orthopedic Surgery; Future        25-year-old female with left knee pain following injury 08/07/2022  Discussed with patient MRI results, impression and plan  MRI left knee noted for status post ACL graft reconstruction with graft intact, rule to deficiency of the body and posterior horn medial meniscus with superimposed complex tear of remaining posterior horn with horizontal and longitudinal components and mild medial extrusion, mild fissuring increased T2 signal along the central femoral trochlea and small focus increased signal along the central eminence of the patellar articular cartilage, articular cartilage heterogeneity T home weight-bearing portion lateral femoral condyle with areas of thinning and thickening an area of bony overgrowth into the cartilage posterior weight-bearing portion lateral femoral condyle, mild bone marrow edema within the weight-bearing portion medial femoral condyle and medial tibial plateau, and to lesser degree along the lateral femoral condyle and lateral tibial plateau  Physical exam left knee noted for tenderness medial and lateral joint line and patellar facet  She has full extension and flexion to 130°  There is no appreciable collateral ligament laxity  Diane's testing is equivocal   Clinical impression is that she is symptomatic from internal derangement of the knee  Since patient has had recurrent instability of the knee, it is uncertain as to when meniscus tear occurred  Patient was advised on options of trial of conservative treatment with injection and therapy, versus orthopedic consultation  She elected for orthopedic consultation  She is to start taking tumeric at least 1000 mg daily, tart cherry at least 1000 mg daily, and glucosamine-chondroitin 2 to 3 times a day  I will refer to orthopedic surgeon for further evaluation and recommendation treatment  She will follow up with me as needed  Subjective:   Patient ID: Ludwin Gomes is a 39 y o  female  Chief Complaint   Patient presents with    Left Knee - Pain       54-year-old female presents for evaluation of left knee pain following injury 08/07/2022  She states her knee gave out and she fell  She had pain described as sudden in onset, localized to the medial aspect of knee, non radiating, throbbing and sharp, worse with bearing weight and ambulating, and improved with resting  She had recurrent injury to the left hand and suffered fracture, which at the time was more bothersome  She was stabilized in ulnar gutter splint, referred for MRI and referred to hand specialist   Dave Estrada to her left knee she reports still having pain at the medial aspect  She states that prior to this injury she has had some episodes of knee giving out/feeling unstable  She has had prior surgery to the left knee  Knee Pain  This is a new problem  The current episode started more than 1 month ago  The problem occurs daily  The problem has been unchanged  Associated symptoms include arthralgias  Pertinent negatives include no abdominal pain, chest pain, chills, fever, joint swelling, numbness, rash, sore throat or weakness  The symptoms are aggravated by twisting, bending, walking and standing  She has tried rest and oral narcotics for the symptoms  The treatment provided mild relief  The following portions of the patient's history were reviewed and updated as appropriate: She  has a past medical history of Anxiety, Bipolar disorder ([address] ), Depression, and OCD (obsessive compulsive disorder)  She  has a past surgical history that includes Brain surgery and Knee surgery  Her family history is not on file  She  reports that she has been smoking cigarettes  She has been smoking about 0 50 packs per day   She has never used smokeless tobacco  She reports previous alcohol use  She reports current drug use  Drug: Marijuana  She is allergic to pregabalin, fentanyl, nitrofurantoin, prozac [fluoxetine], acetaminophen, cortisone, and hydrocodone-acetaminophen       Review of Systems   Constitutional: Negative for chills and fever  HENT: Negative for sore throat  Eyes: Negative for visual disturbance  Respiratory: Negative for shortness of breath  Cardiovascular: Negative for chest pain  Gastrointestinal: Negative for abdominal pain  Genitourinary: Negative for flank pain  Musculoskeletal: Positive for arthralgias  Negative for joint swelling  Skin: Negative for rash and wound  Neurological: Negative for weakness and numbness  Hematological: Does not bruise/bleed easily  Psychiatric/Behavioral: Negative for self-injury  Objective:  Vitals:    09/27/22 1124   BP: 100/68   Pulse: (!) 106   Weight: 54 4 kg (120 lb)   Height: 5' 10" (1 778 m)     Left Ankle Exam     Muscle Strength   Dorsiflexion:  5/5   Plantar flexion:  5/5       Left Knee Exam     Muscle Strength   The patient has normal left knee strength  Tenderness   The patient is experiencing tenderness in the medial joint line and lateral joint line (Patellar facet)  Range of Motion   Extension: normal   Flexion: 130     Tests   Diane:  Left knee medial Diane test: Equivocal    Varus: negative Valgus: negative    Other   Swelling: none      Left Hip Exam     Tests   AARON: negative    Comments:  Negative FADDIR          Strength/Myotome Testing     Left Ankle/Foot   Dorsiflexion: 5  Plantar flexion: 5      Physical Exam  Vitals and nursing note reviewed  Constitutional:       General: She is not in acute distress  Appearance: She is well-developed  She is not ill-appearing or diaphoretic  HENT:      Head: Normocephalic        Right Ear: External ear normal       Left Ear: External ear normal    Eyes:      Conjunctiva/sclera: Conjunctivae normal    Neck:      Trachea: No tracheal deviation  Cardiovascular:      Comments: Tachycardic  Pulmonary:      Effort: Pulmonary effort is normal  No respiratory distress  Abdominal:      General: There is no distension  Musculoskeletal:         General: Tenderness present  No swelling, deformity or signs of injury  Left knee:      Instability Tests: Left knee medial Diane test: Equivocal    Skin:     General: Skin is warm and dry  Coloration: Skin is not jaundiced or pale  Neurological:      Mental Status: She is alert and oriented to person, place, and time  Psychiatric:         Mood and Affect: Mood normal          Behavior: Behavior normal          Thought Content: Thought content normal          Judgment: Judgment normal          I have personally reviewed pertinent films in PACS and my interpretation is Tearing of posterior medial meniscus

## 2022-09-28 ENCOUNTER — APPOINTMENT (OUTPATIENT)
Dept: LAB | Facility: CLINIC | Age: 37
End: 2022-09-28
Payer: COMMERCIAL

## 2022-09-28 ENCOUNTER — OFFICE VISIT (OUTPATIENT)
Dept: INTERNAL MEDICINE CLINIC | Facility: CLINIC | Age: 37
End: 2022-09-28
Payer: COMMERCIAL

## 2022-09-28 ENCOUNTER — TELEPHONE (OUTPATIENT)
Dept: INTERNAL MEDICINE CLINIC | Facility: CLINIC | Age: 37
End: 2022-09-28

## 2022-09-28 VITALS
RESPIRATION RATE: 20 BRPM | TEMPERATURE: 97.8 F | OXYGEN SATURATION: 98 % | WEIGHT: 124.6 LBS | SYSTOLIC BLOOD PRESSURE: 130 MMHG | BODY MASS INDEX: 17.88 KG/M2 | HEART RATE: 76 BPM | DIASTOLIC BLOOD PRESSURE: 72 MMHG

## 2022-09-28 DIAGNOSIS — R21 RASH: Primary | ICD-10-CM

## 2022-09-28 DIAGNOSIS — S62.653D CLOSED NONDISPLACED FRACTURE OF MIDDLE PHALANX OF LEFT MIDDLE FINGER WITH ROUTINE HEALING, SUBSEQUENT ENCOUNTER: Primary | ICD-10-CM

## 2022-09-28 DIAGNOSIS — F11.90 OPIATE USE: ICD-10-CM

## 2022-09-28 DIAGNOSIS — S83.242A TEAR OF MEDIAL MENISCUS OF LEFT KNEE, CURRENT, UNSPECIFIED TEAR TYPE, INITIAL ENCOUNTER: ICD-10-CM

## 2022-09-28 PROCEDURE — 99214 OFFICE O/P EST MOD 30 MIN: CPT | Performed by: FAMILY MEDICINE

## 2022-09-28 PROCEDURE — 80307 DRUG TEST PRSMV CHEM ANLYZR: CPT | Performed by: FAMILY MEDICINE

## 2022-09-28 RX ORDER — NALOXONE HYDROCHLORIDE 4 MG/.1ML
SPRAY NASAL
Qty: 1 EACH | Refills: 1 | Status: SHIPPED | OUTPATIENT
Start: 2022-09-28

## 2022-09-28 RX ORDER — OXYCODONE HYDROCHLORIDE 5 MG/1
5 TABLET ORAL 2 TIMES DAILY PRN
Qty: 60 TABLET | Refills: 0 | Status: SHIPPED | OUTPATIENT
Start: 2022-09-28

## 2022-09-28 NOTE — TELEPHONE ENCOUNTER
SAW   AMMON   Wednesday   FORGOT  TO  SHOW  HER HAND   WANTS  TO KNOW  IF  SHE  CAN  GET  SOME  CREAM  FOR   HER  FINGER   HAS  A SORE  ON  IT   RX  SHOP   [facility]

## 2022-09-28 NOTE — TELEPHONE ENCOUNTER
ALSO  NEEDS  AN  RX  FOR  CLOBETASOL PROPIONATE   PT  SAID  SHE  SHOW  YOU  A  PICTURE ON  HER  PHONE

## 2022-09-28 NOTE — PROGRESS NOTES
FOLLOW-UP OFFICE VISIT  [facility] - [facility]    NAME: Dustin Maurice  AGE: 39 y o  SEX: female  : 1985     DATE: 2022     Assessment and Plan:     Problem List Items Addressed This Visit        Musculoskeletal and Integument    Closed nondisplaced fracture of phalanx of left middle finger with routine healing - Primary    Relevant Medications    oxyCODONE (Roxicodone) 5 immediate release tablet      Other Visit Diagnoses     Tear of medial meniscus of left knee, current, unspecified tear type, initial encounter        Relevant Medications    oxyCODONE (Roxicodone) 5 immediate release tablet    Opiate use        Relevant Medications    naloxone (NARCAN) 4 mg/0 1 mL nasal spray    Other Relevant Orders    Toxicology screen, urine (Completed)            patient has a pain management contract  Will provide oxycodone 5 mg b i d  p r n  for severe pain  Patient to continue to use NSAIDs mild to moderate pain  Urine drug screening ordered as well for continued monitoring         Chief Complaint:     Chief Complaint   Patient presents with   • Follow-up     Pt states that she is here for a referral for hand surgery and pain management         History of Present Illness:     MRIs show need for surgical intervention of her knees continues to have significant pain for her hand  Surgery was postponed for her hand  Tramadol was not approved  using NSAIDs but it is not helping at all  Unable to properly care for herself or son because of the pain  Review of Systems:     Review of Systems   Constitutional: Negative for fever  Musculoskeletal: Positive for arthralgias          Problem List:     Patient Active Problem List   Diagnosis   • 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   • SHASHI (generalized anxiety disorder)   • Recent unexplained weight loss   • Left wrist pain   • Bipolar 1 disorder (HCC)   • Nicotine abuse   • Closed nondisplaced fracture of phalanx of left middle finger with routine healing        Objective:     /72 (BP Location: Left arm, Patient Position: Sitting, Cuff Size: Standard)   Pulse 76   Temp 97 8 °F (36 6 °C) (Temporal)   Resp 20   Wt 56 5 kg (124 lb 9 6 oz)   SpO2 98%   BMI 17 88 kg/m²     Physical Exam  HENT:      Head: Normocephalic and atraumatic  Cardiovascular:      Rate and Rhythm: Normal rate  Pulmonary:      Effort: Pulmonary effort is normal    Musculoskeletal:      Right lower leg: No edema  Left lower leg: No edema  Neurological:      Mental Status: She is alert and oriented to person, place, and time  Pertinent Laboratory/Diagnostic Studies:      Radiology/Other Diagnostic Testing Results: I have personally reviewed pertinent reports          Kandi CHEUNG HSPTLNannette Bertin [facility]  10/9/2022 6:29 PM

## 2022-09-29 RX ORDER — CLOBETASOL PROPIONATE 0.5 MG/G
OINTMENT TOPICAL 2 TIMES DAILY
Qty: 30 G | Refills: 0 | Status: SHIPPED | OUTPATIENT
Start: 2022-09-29

## 2022-10-04 LAB
AMPHETAMINES UR QL SCN: NEGATIVE NG/ML
BARBITURATES UR QL SCN: NEGATIVE NG/ML
BENZODIAZ UR QL: NEGATIVE NG/ML
BZE UR QL: NEGATIVE NG/ML
CANNABINOIDS UR QL SCN: NEGATIVE NG/ML
METHADONE UR QL SCN: NEGATIVE NG/ML
OPIATES UR QL: NEGATIVE
PCP UR QL: NEGATIVE NG/ML
PROPOXYPH UR QL SCN: NEGATIVE NG/ML

## 2022-10-06 ENCOUNTER — APPOINTMENT (OUTPATIENT)
Dept: RADIOLOGY | Facility: CLINIC | Age: 37
End: 2022-10-06
Payer: COMMERCIAL

## 2022-10-06 ENCOUNTER — OFFICE VISIT (OUTPATIENT)
Dept: OBGYN CLINIC | Facility: CLINIC | Age: 37
End: 2022-10-06
Payer: COMMERCIAL

## 2022-10-06 ENCOUNTER — TELEPHONE (OUTPATIENT)
Dept: INTERNAL MEDICINE CLINIC | Facility: CLINIC | Age: 37
End: 2022-10-06

## 2022-10-06 VITALS
HEART RATE: 82 BPM | WEIGHT: 124 LBS | HEIGHT: 70 IN | BODY MASS INDEX: 17.75 KG/M2 | DIASTOLIC BLOOD PRESSURE: 76 MMHG | SYSTOLIC BLOOD PRESSURE: 114 MMHG

## 2022-10-06 DIAGNOSIS — S62.653D CLOSED NONDISPLACED FRACTURE OF MIDDLE PHALANX OF LEFT MIDDLE FINGER WITH ROUTINE HEALING, SUBSEQUENT ENCOUNTER: ICD-10-CM

## 2022-10-06 DIAGNOSIS — S83.232A COMPLEX TEAR OF MEDIAL MENISCUS OF LEFT KNEE AS CURRENT INJURY, INITIAL ENCOUNTER: Primary | ICD-10-CM

## 2022-10-06 DIAGNOSIS — S83.242A TEAR OF MEDIAL MENISCUS OF LEFT KNEE, CURRENT, UNSPECIFIED TEAR TYPE, INITIAL ENCOUNTER: ICD-10-CM

## 2022-10-06 DIAGNOSIS — S83.232A COMPLEX TEAR OF MEDIAL MENISCUS OF LEFT KNEE AS CURRENT INJURY, INITIAL ENCOUNTER: ICD-10-CM

## 2022-10-06 DIAGNOSIS — F41.1 GAD (GENERALIZED ANXIETY DISORDER): ICD-10-CM

## 2022-10-06 DIAGNOSIS — M17.32 POST-TRAUMATIC OSTEOARTHRITIS OF LEFT KNEE: ICD-10-CM

## 2022-10-06 DIAGNOSIS — M25.562 ACUTE PAIN OF LEFT KNEE: ICD-10-CM

## 2022-10-06 PROCEDURE — 73564 X-RAY EXAM KNEE 4 OR MORE: CPT

## 2022-10-06 PROCEDURE — 73560 X-RAY EXAM OF KNEE 1 OR 2: CPT

## 2022-10-06 PROCEDURE — 99214 OFFICE O/P EST MOD 30 MIN: CPT | Performed by: ORTHOPAEDIC SURGERY

## 2022-10-06 PROCEDURE — 20610 DRAIN/INJ JOINT/BURSA W/O US: CPT | Performed by: ORTHOPAEDIC SURGERY

## 2022-10-06 RX ORDER — BUPIVACAINE HYDROCHLORIDE 2.5 MG/ML
2 INJECTION, SOLUTION INFILTRATION; PERINEURAL
Status: COMPLETED | OUTPATIENT
Start: 2022-10-06 | End: 2022-10-06

## 2022-10-06 RX ORDER — CLONAZEPAM 2 MG/1
2 TABLET ORAL 2 TIMES DAILY
Qty: 60 TABLET | Refills: 0 | OUTPATIENT
Start: 2022-10-06 | End: 2022-11-05

## 2022-10-06 RX ORDER — KETOROLAC TROMETHAMINE 30 MG/ML
30 INJECTION, SOLUTION INTRAMUSCULAR; INTRAVENOUS
Status: COMPLETED | OUTPATIENT
Start: 2022-10-06 | End: 2022-10-06

## 2022-10-06 RX ORDER — LIDOCAINE HYDROCHLORIDE 10 MG/ML
2 INJECTION, SOLUTION INFILTRATION; PERINEURAL
Status: COMPLETED | OUTPATIENT
Start: 2022-10-06 | End: 2022-10-06

## 2022-10-06 RX ORDER — OXYCODONE HYDROCHLORIDE 5 MG/1
5 TABLET ORAL 2 TIMES DAILY PRN
Qty: 60 TABLET | Refills: 0 | OUTPATIENT
Start: 2022-10-06

## 2022-10-06 RX ADMIN — LIDOCAINE HYDROCHLORIDE 2 ML: 10 INJECTION, SOLUTION INFILTRATION; PERINEURAL at 16:39

## 2022-10-06 RX ADMIN — KETOROLAC TROMETHAMINE 30 MG: 30 INJECTION, SOLUTION INTRAMUSCULAR; INTRAVENOUS at 16:59

## 2022-10-06 RX ADMIN — BUPIVACAINE HYDROCHLORIDE 2 ML: 2.5 INJECTION, SOLUTION INFILTRATION; PERINEURAL at 16:39

## 2022-10-06 NOTE — TELEPHONE ENCOUNTER
[facility]   F#: [phone]- per pt's request

## 2022-10-06 NOTE — PROGRESS NOTES
Patient Name:  Annika Mendenhall  MRN:  [id number]    [address] [place] [address] [place]     1  Complex tear of medial meniscus of left knee as current injury, initial encounter  -     XR knee 4+ vw left injury; Future; Expected date: 10/06/2022  -     XR knee 1 or 2 vw right; Future; Expected date: 10/06/2022  -     Ambulatory Referral to Orthopedic Surgery  -     Ambulatory Referral to Physical Therapy; Future  -     Large joint arthrocentesis: L knee    2  Post-traumatic osteoarthritis of left knee  -     Ambulatory Referral to Physical Therapy; Future  -     Large joint arthrocentesis: L knee    3  Acute pain of left knee      39 y o  female with Left knee osteoarthritis, medial meniscus tear with history of ACL and menisectomy  X-rays reviewed in office today with patient  In depth discussion had with patient regarding nonoperative vs surgical management of Left knee pain  Nonoperative management including outpatient PT to work on strengthening, corticosteroid vs Toradol injection, ice application, and OTC oral analgesics as needed for pain relief  Surgical management by means of Left knee arthroscopic partial medial menisectomy, all associated procedures  After viewing patient's x-rays and recent MRI, advised patient would be very unlikely to preform meniscus repair secondary to degree of osteoarthritis limited tissue evident on MRI  Advised patient it is not uncommon to see meniscus tears with osteoarthritis  Also advised patient that performing menisectomy with evidence of osteoarthritis can provide unpredictable results and may still notice pain secondary to osteoarthritis  At this time, strongly recommended Toradol injection, as patient is allergic to cortisone, and outpatient PT and patient agreeable  Risks and benefits of Toradol injection were discussed in detail  Risks including: Post injection pain, skin discoloration, and infection were discussed in detail  The patient understood and elected to proceed forward    After sterile preparation the Left knee was injected with 2 cc of 1% lidocaine, 2 cc of 0 25% bupivacaine, and 1 cc of 30 mg Toradol  The patient tolerated the procedure and no immediate complications were noted  The patient was instructed to ice and elevate the injection site, limit strenuous activity for the next 24-48 hours, and contact us if there were any questions or concerns prior to their follow-up appointment  I will see the patient back in about 10-12 weeks for reevaluation of Left knee  Chief Complaint     Left knee pain    History of the Present Illness     Arabella Ying is a 39 y o  female with Left knee pain ongoing since 08/07/2022  Patient reports she was jogging and her Left knee gave out and she noted moderate pain  She admits to history of two surgeries of her Left knee including an ACL reconstruction and menisectomy  She states she did perform PT at that time  She recently did seek out medical care from Dr Vadim Holland whom ordered an MRI demonstrating medial meniscus tear and osteoarthritis  Patient states she does not want a corticosteroid injection because she is allergic with her reaction of "swelling and redness"  She states she wants her Left knee to be "fixed" so she can play with her son when he is older  Review of Systems     Review of Systems   Constitutional: Negative for chills and fever  HENT: Negative for congestion  Respiratory: Negative for cough, chest tightness and shortness of breath  Cardiovascular: Negative for chest pain and palpitations  Gastrointestinal: Negative for abdominal pain  Endocrine: Negative for cold intolerance and heat intolerance  Neurological: Negative for syncope  Psychiatric/Behavioral: Negative for confusion  Physical Exam     /76   Pulse 82   Ht 5' 10" (1 778 m)   Wt 56 2 kg (124 lb)   BMI 17 79 kg/m²     Left Knee  Previous surgical incisions well healed   Range of motion from 0 to 140      There is no crepitus with range of motion  There is no effusion  There is tenderness over the medial > lateral joint line  There is 5/5 quadriceps strength and preserved tone  The patient is able to perform a straight leg raise  negative patellar grind test   Anterior drawer tests is negative  negative Lachman Test    Posterior drawer test is   negative   Varus stress testing reveals no instability at 0 and 30 degrees   Valgus stress testing reveals no instability at 0 and 30 degrees  Diane's testing is negative   The patient is neurovascular intact distally  Eyes:  Anicteric sclerae  Neck:  Supple  Lungs:  Normal respiratory effort  Cardiovascular:  Capillary refill is less than 2 seconds  Skin:  Intact without erythema  Neurologic:  Sensation grossly intact to light touch  Psychiatric:  Mood and affect are appropriate  Data Review     I have personally reviewed pertinent films in PACS, and my interpretation follows:    X-rays taken 10/06/2022 of Left knee demonstrates mild to mild-moderate tricompartmental osteoarthritis with orthopaedic hardware intact; no evidence of failure or fracture      Past Medical History:   Diagnosis Date    Anxiety     Bipolar disorder ([address] )     Depression     OCD (obsessive compulsive disorder)        Past Surgical History:   Procedure Laterality Date    BRAIN SURGERY      KNEE SURGERY         Allergies   Allergen Reactions    Pregabalin Rash and Swelling    Fentanyl Hives     Fever and migraine      Nitrofurantoin Swelling    Prozac [Fluoxetine] Confusion    Acetaminophen Rash    Cortisone Rash and Swelling    Hydrocodone-Acetaminophen Rash       Current Outpatient Medications on File Prior to Visit   Medication Sig Dispense Refill    clobetasol (TEMOVATE) 0 05 % ointment Apply topically 2 (two) times a day 30 g 0    clonazePAM (KlonoPIN) 2 mg tablet TAKE 1 TABLET (2 MG TOTAL) BY MOUTH 2 (TWO) TIMES A DAY 60 tablet 0    naloxone (NARCAN) 4 mg/0 1 mL nasal spray Administer 1 spray into a nostril  If no response after 2-3 minutes, give another dose in the other nostril using a new spray  1 each 1    oxyCODONE (Roxicodone) 5 immediate release tablet Take 1 tablet (5 mg total) by mouth 2 (two) times a day as needed for moderate pain Max Daily Amount: 10 mg 60 tablet 0    venlafaxine (EFFEXOR-XR) 37 5 mg 24 hr capsule Take 1 capsule (37 5 mg total) by mouth daily with breakfast 90 capsule 0    ibuprofen (MOTRIN) 800 mg tablet Take 1 tablet (800 mg total) by mouth every 8 (eight) hours as needed for mild pain (Patient not taking: No sig reported) 60 tablet 0    norethindrone-ethinyl estradiol (Junel FE 1/20) 1-20 MG-MCG per tablet Take 1 tablet by mouth daily 90 tablet 1    Nutritional Supplements (Ensure) Take 250 mL by mouth 3 (three) times a day (Patient taking differently: Take 1 Can by mouth if needed) 98496 mL 0     No current facility-administered medications on file prior to visit  Social History     Tobacco Use    Smoking status: Current Every Day Smoker     Packs/day: 0 50     Types: Cigarettes    Smokeless tobacco: Never Used   Vaping Use    Vaping Use: Never used   Substance Use Topics    Alcohol use: Not Currently    Drug use: Yes     Types: Marijuana       No family history on file            Procedures Performed     Large joint arthrocentesis: L knee  Universal Protocol:  Consent given by: patient  Patient identity confirmed: verbally with patient    Procedure Details  Location: knee - L knee  Needle size: 22 G  Approach: lateral  Medications administered: 2 mL bupivacaine 0 25 %; 2 mL lidocaine 1 %; 30 mg ketorolac 30 mg/mL    Patient tolerance: patient tolerated the procedure well with no immediate complications  Dressing:  Sterile dressing applied              Nithin Miller DO

## 2022-10-14 ENCOUNTER — TELEPHONE (OUTPATIENT)
Dept: INTERNAL MEDICINE CLINIC | Facility: CLINIC | Age: 37
End: 2022-10-14

## 2022-10-14 DIAGNOSIS — Z30.41 ENCOUNTER FOR SURVEILLANCE OF CONTRACEPTIVE PILLS: ICD-10-CM

## 2022-10-14 DIAGNOSIS — F41.1 GAD (GENERALIZED ANXIETY DISORDER): ICD-10-CM

## 2022-10-14 RX ORDER — CLONAZEPAM 2 MG/1
2 TABLET ORAL 2 TIMES DAILY
Qty: 60 TABLET | Refills: 0 | Status: SHIPPED | OUTPATIENT
Start: 2022-10-18 | End: 2022-11-17

## 2022-10-14 RX ORDER — NORETHINDRONE ACETATE AND ETHINYL ESTRADIOL 1MG-20(21)
1 KIT ORAL DAILY
Qty: 90 TABLET | Refills: 1 | Status: SHIPPED | OUTPATIENT
Start: 2022-10-14 | End: 2022-11-13

## 2022-10-14 NOTE — TELEPHONE ENCOUNTER
2nd request refill of   Jamilah-Fe 1-20 mg   Not on med list
Pt called in to check on this medication  She is requesting a call back 
That is birth control  Arlen never prescribed that for her    I can not refill
called pt   And was notified and stated last filled by dimitri barrios , listed on med list and she is also asking about her klonopin
junel sent
(4) rarely moist

## 2022-10-17 ENCOUNTER — TELEPHONE (OUTPATIENT)
Dept: INTERNAL MEDICINE CLINIC | Facility: CLINIC | Age: 37
End: 2022-10-17

## 2022-11-01 ENCOUNTER — TELEPHONE (OUTPATIENT)
Dept: INTERNAL MEDICINE CLINIC | Facility: CLINIC | Age: 37
End: 2022-11-01

## 2022-11-01 NOTE — TELEPHONE ENCOUNTER
I left message for pt to call us back I try calling the doctor office in [facility] and wasn't able to reach someone so I could fax referrals to waiting on pt's call

## 2022-11-01 NOTE — TELEPHONE ENCOUNTER
please call pt or physician's office (number listed in previous message) for a fax number to send the referral

## 2022-11-01 NOTE — TELEPHONE ENCOUNTER
I tried calling doctor office and spoke to  to transfer me and don't know who is doctor tawanda paredes where referral is supposed to be fax to I have tried calling and leaving messages for pt to call me back so I would know what to do with my referrals

## 2022-11-01 NOTE — TELEPHONE ENCOUNTER
Patient needs the Ambulatory Referral to Orthopedics changed from Dr Preston Singh to:    WAYNE Kong  [facility]: [phone]    Referral needs to be faxed to Dr Justo Becker so she can make an appointment

## 2022-11-02 DIAGNOSIS — M25.532 LEFT WRIST PAIN: Primary | ICD-10-CM

## 2022-11-02 NOTE — TELEPHONE ENCOUNTER
Tereso Pino took care of this patients referral by added the doctor and finding the fax number and it was fax to doctor neff [facility] [place] [address] office [phone] earlier today 11/2/22

## 2022-11-03 ENCOUNTER — TELEPHONE (OUTPATIENT)
Dept: INTERNAL MEDICINE CLINIC | Facility: CLINIC | Age: 37
End: 2022-11-03

## 2022-11-03 NOTE — TELEPHONE ENCOUNTER
Dr Anuj Barrera    LVM in regards to the referral that was faxed to them, unsure if it is for a consult or EMG    Also needs demographic info & no progress notes     # [phone]

## 2022-11-03 NOTE — TELEPHONE ENCOUNTER
Please forward patient demographic sheet and progress notes to  Dr Pancho Melgar to complete the referral that was received       Fax: [phone]

## 2022-11-07 NOTE — TELEPHONE ENCOUNTER
I fax over today the demographics to [address] [place] [address] office with attention alen to [phone]

## 2022-11-14 ENCOUNTER — TELEPHONE (OUTPATIENT)
Dept: INTERNAL MEDICINE CLINIC | Facility: CLINIC | Age: 37
End: 2022-11-14

## 2022-11-14 DIAGNOSIS — F41.1 GAD (GENERALIZED ANXIETY DISORDER): ICD-10-CM

## 2022-11-14 RX ORDER — CLONAZEPAM 2 MG/1
2 TABLET ORAL 2 TIMES DAILY
Qty: 60 TABLET | Refills: 0 | Status: SHIPPED | OUTPATIENT
Start: 2022-11-14 | End: 2022-12-14

## 2022-11-14 NOTE — TELEPHONE ENCOUNTER
No, I don't have a medical [facility] prescription licence  She has to undergo the same process when obtaining the card to renew it

## 2022-12-02 ENCOUNTER — TELEPHONE (OUTPATIENT)
Dept: INTERNAL MEDICINE CLINIC | Facility: CLINIC | Age: 37
End: 2022-12-02

## 2022-12-02 NOTE — TELEPHONE ENCOUNTER
Left message at Dr Tyrese Saini office to ask why they wont treat her  I called [phone]  I would like to speak to whoever calls personally   Please get me do not transfer to voice mail

## 2022-12-02 NOTE — TELEPHONE ENCOUNTER
PT  CALLED   ALL  UPSET   DR VERDE  REFERRED  HER  OVER TO  PAIN  MANAGEMENT    SHE  HAD  AN  APPT  TODAY  AND  THEY  TOLD  HER   THEY  COULD   NOT   HELP   HER   BECAUSE   SHE  HAS   A  PAIN  MANAGEMENT  PAPER   SIGN   WITH  DR VERDE    PT  SAID  SHE  HASN'T   GOTTEN  ANYTHING  FROM DR VERDE   FOR  A WHILE   SHE   WAS  WAITING  TO  SEE  PAIN  MANAGEMENT BUT  WANTS  TO KNOW  WHAT  SHE  SHOULD   DO  NOW   SHE  IS  IN  A LOT  OF  PAIN

## 2022-12-08 ENCOUNTER — OFFICE VISIT (OUTPATIENT)
Dept: INTERNAL MEDICINE CLINIC | Facility: CLINIC | Age: 37
End: 2022-12-08

## 2022-12-08 VITALS
BODY MASS INDEX: 19.2 KG/M2 | RESPIRATION RATE: 18 BRPM | WEIGHT: 133.8 LBS | HEART RATE: 81 BPM | DIASTOLIC BLOOD PRESSURE: 64 MMHG | OXYGEN SATURATION: 94 % | SYSTOLIC BLOOD PRESSURE: 102 MMHG | TEMPERATURE: 97.7 F

## 2022-12-08 DIAGNOSIS — S62.653D CLOSED NONDISPLACED FRACTURE OF MIDDLE PHALANX OF LEFT MIDDLE FINGER WITH ROUTINE HEALING, SUBSEQUENT ENCOUNTER: ICD-10-CM

## 2022-12-08 DIAGNOSIS — F11.90 OPIATE MISUSE: Primary | ICD-10-CM

## 2022-12-08 RX ORDER — HYOSCYAMINE SULFATE 0.125 MG
TABLET,DISINTEGRATING ORAL
COMMUNITY
Start: 2022-10-15

## 2022-12-08 RX ORDER — DIPHENOXYLATE HYDROCHLORIDE AND ATROPINE SULFATE 2.5; .025 MG/1; MG/1
1 TABLET ORAL DAILY
COMMUNITY

## 2022-12-09 DIAGNOSIS — F41.1 GAD (GENERALIZED ANXIETY DISORDER): ICD-10-CM

## 2022-12-12 DIAGNOSIS — F41.1 GAD (GENERALIZED ANXIETY DISORDER): ICD-10-CM

## 2022-12-12 RX ORDER — CLONAZEPAM 2 MG/1
2 TABLET ORAL 2 TIMES DAILY
Qty: 60 TABLET | Refills: 0 | OUTPATIENT
Start: 2022-12-12 | End: 2023-01-11

## 2022-12-12 RX ORDER — CLONAZEPAM 2 MG/1
2 TABLET ORAL 2 TIMES DAILY
Qty: 60 TABLET | Refills: 0 | Status: SHIPPED | OUTPATIENT
Start: 2022-12-12 | End: 2023-01-11

## 2022-12-12 NOTE — TELEPHONE ENCOUNTER
Patient called on 12/9 for script clonazePAM (KlonoPIN) 2 mg tablet she is now out of this med needs it today please call her when this is done [phone]

## 2022-12-16 NOTE — PROGRESS NOTES
Name: John Lindsey      : 1985      MRN: [id number]  Encounter Provider: Tea Lagunas DO  Encounter Date: 2022   Encounter department: [facility]   [address]     1  Opiate misuse    2  Closed nondisplaced fracture of middle phalanx of left middle finger with routine healing, subsequent encounter    Pt desire opiate therapy as primary pain controller while she waits for surgery  Recommends from specialist are not congruent  Will not prescribe opiate therapy  Recommend pt f/u with PT and use otc analgesia  Subjective      Reviewed recent orthopedic consults and upcoming treatment   Reviewed opiate misuse  Review of Systems   Musculoskeletal: Positive for arthralgias  Negative for gait problem  Current Outpatient Medications on File Prior to Visit   Medication Sig   • clobetasol (TEMOVATE) 0 05 % ointment Apply topically 2 (two) times a day   • hyoscyamine (ANASPAZ) 0 125 mg DISSOLVE 1 TABLET IN MOUTH 3 TIMES A DAY AS NEEDED   • ibuprofen (MOTRIN) 800 mg tablet Take 1 tablet (800 mg total) by mouth every 8 (eight) hours as needed for mild pain (Patient not taking: No sig reported)   • multivitamin (THERAGRAN) TABS Take 1 tablet by mouth daily   • naloxone (NARCAN) 4 mg/0 1 mL nasal spray Administer 1 spray into a nostril  If no response after 2-3 minutes, give another dose in the other nostril using a new spray     • norethindrone-ethinyl estradiol (Junel FE 1/20) 1-20 MG-MCG per tablet Take 1 tablet by mouth daily   • Nutritional Supplements (Ensure) Take 250 mL by mouth 3 (three) times a day (Patient taking differently: Take 1 Can by mouth if needed)   • oxyCODONE (Roxicodone) 5 immediate release tablet Take 1 tablet (5 mg total) by mouth 2 (two) times a day as needed for moderate pain Max Daily Amount: 10 mg (Patient not taking: Reported on 2022)   • venlafaxine (EFFEXOR-XR) 37 5 mg 24 hr capsule Take 1 capsule (37 5 mg total) by mouth daily with breakfast       Objective     /64 (BP Location: Left arm, Patient Position: Sitting, Cuff Size: Standard)   Pulse 81   Temp 97 7 °F (36 5 °C) (Temporal)   Resp 18   Wt 60 7 kg (133 lb 12 8 oz)   SpO2 94%   BMI 19 20 kg/m²     Physical Exam  HENT:      Head: Normocephalic  Cardiovascular:      Rate and Rhythm: Normal rate  Pulmonary:      Effort: Pulmonary effort is normal    Neurological:      Mental Status: She is alert  Psychiatric:         Attention and Perception: Attention normal          Mood and Affect: Mood is anxious  Affect is tearful  Behavior: Behavior is agitated         Brad Fleming, DO

## 2023-01-09 DIAGNOSIS — F41.1 GAD (GENERALIZED ANXIETY DISORDER): ICD-10-CM

## 2023-01-10 ENCOUNTER — TELEPHONE (OUTPATIENT)
Dept: INTERNAL MEDICINE CLINIC | Facility: CLINIC | Age: 38
End: 2023-01-10

## 2023-01-10 RX ORDER — CLONAZEPAM 2 MG/1
2 TABLET ORAL 2 TIMES DAILY
Qty: 60 TABLET | Refills: 0 | Status: SHIPPED | OUTPATIENT
Start: 2023-01-10 | End: 2023-01-11

## 2023-01-10 NOTE — TELEPHONE ENCOUNTER
Patient states the Pharmacy, [facility] says medication was sent and then cancelled by Dr Reji Noel  Please check to see why Clonazepam 2 mg tablet was cancelled  Advise patient about medication

## 2023-01-11 RX ORDER — CLONAZEPAM 2 MG/1
2 TABLET ORAL 2 TIMES DAILY
Qty: 60 TABLET | Refills: 0 | Status: SHIPPED | OUTPATIENT
Start: 2023-01-11 | End: 2023-02-10

## 2023-01-17 ENCOUNTER — APPOINTMENT (OUTPATIENT)
Dept: LAB | Facility: CLINIC | Age: 38
End: 2023-01-17

## 2023-01-17 ENCOUNTER — OFFICE VISIT (OUTPATIENT)
Dept: INTERNAL MEDICINE CLINIC | Facility: CLINIC | Age: 38
End: 2023-01-17

## 2023-01-17 VITALS
TEMPERATURE: 97.3 F | OXYGEN SATURATION: 96 % | BODY MASS INDEX: 18.08 KG/M2 | SYSTOLIC BLOOD PRESSURE: 100 MMHG | WEIGHT: 126 LBS | DIASTOLIC BLOOD PRESSURE: 68 MMHG | HEART RATE: 95 BPM | RESPIRATION RATE: 18 BRPM

## 2023-01-17 DIAGNOSIS — F41.1 GAD (GENERALIZED ANXIETY DISORDER): ICD-10-CM

## 2023-01-17 DIAGNOSIS — Z00.00 ANNUAL PHYSICAL EXAM: Primary | ICD-10-CM

## 2023-01-17 DIAGNOSIS — R79.89 ELEVATED TSH: ICD-10-CM

## 2023-01-17 DIAGNOSIS — F31.9 BIPOLAR 1 DISORDER (HCC): ICD-10-CM

## 2023-01-17 DIAGNOSIS — S62.653D CLOSED NONDISPLACED FRACTURE OF MIDDLE PHALANX OF LEFT MIDDLE FINGER WITH ROUTINE HEALING, SUBSEQUENT ENCOUNTER: ICD-10-CM

## 2023-01-17 DIAGNOSIS — Z00.00 ANNUAL PHYSICAL EXAM: ICD-10-CM

## 2023-01-17 DIAGNOSIS — Z30.41 ENCOUNTER FOR SURVEILLANCE OF CONTRACEPTIVE PILLS: ICD-10-CM

## 2023-01-17 RX ORDER — AMITRIPTYLINE HYDROCHLORIDE 50 MG/1
50 TABLET, FILM COATED ORAL
Qty: 30 TABLET | Refills: 1 | Status: SHIPPED | OUTPATIENT
Start: 2023-01-17

## 2023-01-17 RX ORDER — CELECOXIB 100 MG/1
100 CAPSULE ORAL 2 TIMES DAILY
Qty: 60 CAPSULE | Refills: 0 | Status: SHIPPED | OUTPATIENT
Start: 2023-01-17 | End: 2023-02-16

## 2023-01-17 RX ORDER — NORETHINDRONE ACETATE AND ETHINYL ESTRADIOL 1MG-20(21)
1 KIT ORAL DAILY
Qty: 90 TABLET | Refills: 1 | Status: SHIPPED | OUTPATIENT
Start: 2023-01-17 | End: 2023-02-16

## 2023-01-17 NOTE — PATIENT INSTRUCTIONS

## 2023-01-17 NOTE — PROGRESS NOTES
ADULT ANNUAL PHYSICAL   [facility]    NAME: Virgilio Reyes  AGE: 44 y o  SEX: female  : 1985     DATE: 2023     Assessment and Plan:     Problem List Items Addressed This Visit        Musculoskeletal and Integument    Closed nondisplaced fracture of phalanx of left middle finger with routine healing    Relevant Medications    celecoxib (CeleBREX) 100 mg capsule       Other    SHASHI (generalized anxiety disorder)    Relevant Medications    amitriptyline (ELAVIL) 50 mg tablet    Bipolar 1 disorder (HCC)    Relevant Medications    amitriptyline (ELAVIL) 50 mg tablet   Other Visit Diagnoses     Annual physical exam    -  Primary    Relevant Orders    Basic metabolic panel    Elevated TSH        Relevant Orders    TSH, 3rd generation with Free T4 reflex    Encounter for surveillance of contraceptive pills        Relevant Medications    norethindrone-ethinyl estradiol (Junel FE 1/20) 1-20 MG-MCG per tablet      refills provided  tsh has been slowly increasing  Last tsh was 4 68  Will repeat and treat accordingly  Celebrex for arthralgia  Pt didn't do well with effexor  Having issues with sleep  Will try elavil for mood disorder  Immunizations and preventive care screenings were discussed with patient today  Appropriate education was printed on patient's after visit summary  Counseling:  Dental Health: discussed importance of regular tooth brushing, flossing, and dental visits  · Exercise: the importance of regular exercise/physical activity was discussed  Recommend exercise 3-5 times per week for at least 30 minutes  Return if symptoms worsen or fail to improve       Chief Complaint:     Chief Complaint   Patient presents with   • Physical Exam     Pt states that this is a check up also have some concern to discuss      History of Present Illness:     Adult Annual Physical   Patient here for a comprehensive physical exam  The patient reports problems - conitued pain in hand  currenlty going through and pt/ot   Diet and Physical Activity  · Diet/Nutrition: consuming 3-5 servings of fruits/vegetables daily  · Exercise: no formal exercise  Depression Screening  PHQ-2/9 Depression Screening    Little interest or pleasure in doing things: 0 - not at all  Feeling down, depressed, or hopeless: 0 - not at all  PHQ-2 Score: 0  PHQ-2 Interpretation: Negative depression screen       General Health  · Sleep: sleeps well  · Hearing: normal - bilateral   · Vision: no vision problems  · Dental: no dental visits for >1 year  /GYN Health    · Contraceptive method: oral contraceptives  · Last pap- scheduled for may of this year   · [facility] 1/16/2023     Review of Systems:     Review of Systems   Constitutional: Negative for fever  HENT: Negative for postnasal drip and trouble swallowing  Respiratory: Negative for shortness of breath  Cardiovascular: Negative for chest pain  Musculoskeletal: Positive for arthralgias        Past Medical History:     Past Medical History:   Diagnosis Date   • Anxiety    • Bipolar disorder ([facility] [address] )    • Depression    • OCD (obsessive compulsive disorder)       Past Surgical History:     Past Surgical History:   Procedure Laterality Date   • BRAIN SURGERY     • KNEE SURGERY        Social History:     Social History     Socioeconomic History   • Marital status: Single     Spouse name: None   • Number of children: None   • Years of education: None   • Highest education level: None   Occupational History   • None   Tobacco Use   • Smoking status: Every Day     Packs/day: 0 50     Types: Cigarettes   • Smokeless tobacco: Never   Vaping Use   • Vaping Use: Never used   Substance and Sexual Activity   • Alcohol use: Not Currently   • Drug use: Yes     Types: Marijuana   • Sexual activity: Yes   Other Topics Concern   • None   Social History Narrative   • None     Social Determinants of Health     Financial Resource Strain: Not on file   Food Insecurity: Not on file   Transportation Needs: Not on file   Physical Activity: Sufficiently Active   • Days of Exercise per Week: 3 days   • Minutes of Exercise per Session: 90 min   Stress: No Stress Concern Present   • Feeling of Stress : Not at all   Social Connections: Not on file   Intimate Partner Violence: Not on file   Housing Stability: Not on file      Family History:     History reviewed  No pertinent family history  Current Medications:     Current Outpatient Medications   Medication Sig Dispense Refill   • amitriptyline (ELAVIL) 50 mg tablet Take 1 tablet (50 mg total) by mouth daily at bedtime 30 tablet 1   • celecoxib (CeleBREX) 100 mg capsule Take 1 capsule (100 mg total) by mouth 2 (two) times a day 60 capsule 0   • clonazePAM (KlonoPIN) 2 mg tablet Take 1 tablet (2 mg total) by mouth 2 (two) times a day 60 tablet 0   • norethindrone-ethinyl estradiol (Junel FE 1/20) 1-20 MG-MCG per tablet Take 1 tablet by mouth daily 90 tablet 1   • multivitamin (THERAGRAN) TABS Take 1 tablet by mouth daily (Patient not taking: Reported on 1/17/2023)       No current facility-administered medications for this visit  Allergies: Allergies   Allergen Reactions   • Pregabalin Rash and Swelling   • Fentanyl Hives     Fever and migraine     • Nitrofurantoin Swelling   • Prozac [Fluoxetine] Confusion   • Acetaminophen Rash   • Cortisone Rash and Swelling   • Hydrocodone-Acetaminophen Rash      Physical Exam:     /68 (BP Location: Left arm, Patient Position: Sitting, Cuff Size: Standard)   Pulse 95   Temp (!) 97 3 °F (36 3 °C) (Temporal)   Resp 18   Wt 57 2 kg (126 lb)   SpO2 96%   BMI 18 08 kg/m²     Physical Exam  HENT:      Head: Normocephalic  Right Ear: External ear normal       Left Ear: External ear normal    Eyes:      Extraocular Movements: Extraocular movements intact        Conjunctiva/sclera: Conjunctivae normal    Cardiovascular:      Rate and Rhythm: Normal rate and regular rhythm  Heart sounds: No murmur heard  Pulmonary:      Effort: Pulmonary effort is normal    Abdominal:      General: Abdomen is flat  Palpations: Abdomen is soft  Skin:     General: Skin is warm  Neurological:      Mental Status: She is alert and oriented to person, place, and time     Psychiatric:         Mood and Affect: Mood normal          Behavior: Behavior normal           Nicky Estrada DO   [facility]

## 2023-01-18 LAB
ANION GAP SERPL CALCULATED.3IONS-SCNC: 4 MMOL/L (ref 4–13)
BUN SERPL-MCNC: 15 MG/DL (ref 5–25)
CALCIUM SERPL-MCNC: 9 MG/DL (ref 8.3–10.1)
CHLORIDE SERPL-SCNC: 106 MMOL/L (ref 96–108)
CO2 SERPL-SCNC: 29 MMOL/L (ref 21–32)
CREAT SERPL-MCNC: 1 MG/DL (ref 0.6–1.3)
GFR SERPL CREATININE-BSD FRML MDRD: 71 ML/MIN/1.73SQ M
GLUCOSE P FAST SERPL-MCNC: 93 MG/DL (ref 65–99)
POTASSIUM SERPL-SCNC: 4.4 MMOL/L (ref 3.5–5.3)
SODIUM SERPL-SCNC: 139 MMOL/L (ref 135–147)
T4 FREE SERPL-MCNC: 0.95 NG/DL (ref 0.76–1.46)
TSH SERPL DL<=0.05 MIU/L-ACNC: 4.97 UIU/ML (ref 0.45–4.5)

## 2023-01-20 ENCOUNTER — TELEPHONE (OUTPATIENT)
Dept: INTERNAL MEDICINE CLINIC | Facility: CLINIC | Age: 38
End: 2023-01-20

## 2023-01-20 DIAGNOSIS — E03.9 ACQUIRED HYPOTHYROIDISM: Primary | ICD-10-CM

## 2023-01-20 RX ORDER — LEVOTHYROXINE SODIUM 0.03 MG/1
25 TABLET ORAL
Qty: 30 TABLET | Refills: 0 | Status: SHIPPED | OUTPATIENT
Start: 2023-01-20 | End: 2023-02-19

## 2023-01-20 NOTE — TELEPHONE ENCOUNTER
I spoke to [facility] she also stated that she can't take elavil 50 mg it makes her to sleepy and she has to take care of her son if you want to prescribe something else for her its ok

## 2023-01-20 NOTE — TELEPHONE ENCOUNTER
----- Message from Benjamin Lilly DO sent at 1/20/2023 10:47 AM EST -----  Overall blood work is ok  Thyroid level is still elevated  I recommend starting thyroid hormone replacement therapy to normalize this     I will send to pharmacy today

## 2023-02-01 ENCOUNTER — OFFICE VISIT (OUTPATIENT)
Dept: DERMATOLOGY | Facility: CLINIC | Age: 38
End: 2023-02-01

## 2023-02-01 VITALS — HEIGHT: 70 IN | WEIGHT: 126 LBS | BODY MASS INDEX: 18.04 KG/M2

## 2023-02-01 DIAGNOSIS — Z13.89 SCREENING FOR SKIN CONDITION: ICD-10-CM

## 2023-02-01 DIAGNOSIS — L74.510 HYPERHIDROSIS OF AXILLA: Primary | ICD-10-CM

## 2023-02-01 DIAGNOSIS — B36.0 TINEA VERSICOLOR: ICD-10-CM

## 2023-02-01 RX ORDER — KETOCONAZOLE 20 MG/ML
1 SHAMPOO TOPICAL WEEKLY
Qty: 120 ML | Refills: 3 | Status: SHIPPED | OUTPATIENT
Start: 2023-02-01

## 2023-02-01 NOTE — PROGRESS NOTES
[facility] Note     Patient Name: Adithya Meneses  Encounter Date: February 1, 2023     Have you been cared for by a [place] [address] Dermatologist in the last 3 years and, if so, which description applies to you? NO  I am considered a "new" patient and must complete all patient intake questions  I am FEMALE/of child-bearing potential     REVIEW OF SYSTEMS:  Have you recently had or currently have any of the following? · Recent fever or chills? No  · Any non-healing wound? No  · Are you pregnant or planning to become pregnant? No  · Are you currently or planning to be nursing or breast feeding? No   PAST MEDICAL HISTORY:  Have you personally ever had or currently have any of the following? If "YES," then please provide more detail  · Skin cancer (such as Melanoma, Basal Cell Carcinoma, Squamous Cell Carcinoma? No  · Tuberculosis, HIV/AIDS, Hepatitis B or C: No  · Systemic Immunosuppression such as Diabetes, Biologic or Immunotherapy, Chemotherapy, Organ Transplantation, Bone Marrow Transplantation No  · Radiation Treatment No   FAMILY HISTORY:  Any "first degree relatives" (parent, brother, sister, or child) with the following? • Skin Cancer, Pancreatic or Other Cancer? No   PATIENT EXPERIENCE:    • Do you want the Dermatologist to perform a COMPLETE skin exam today including a clinical examination under the "bra and underwear" areas? NO  • If necessary, do we have your permission to call and leave a detailed message on your Preferred Phone number that includes your specific medical information?   Yes      Allergies   Allergen Reactions   • Pregabalin Rash and Swelling   • Fentanyl Hives     Fever and migraine     • Nitrofurantoin Swelling   • Prozac [Fluoxetine] Confusion   • Acetaminophen Rash   • Cortisone Rash and Swelling   • Hydrocodone-Acetaminophen Rash      Current Outpatient Medications:   •  celecoxib (CeleBREX) 100 mg capsule, Take 1 capsule (100 mg total) by mouth 2 (two) times a day, Disp: 60 capsule, Rfl: 0  •  clonazePAM (KlonoPIN) 2 mg tablet, Take 1 tablet (2 mg total) by mouth 2 (two) times a day, Disp: 60 tablet, Rfl: 0  •  levothyroxine (Euthyrox) 25 mcg tablet, Take 1 tablet (25 mcg total) by mouth daily in the early morning, Disp: 30 tablet, Rfl: 0  •  norethindrone-ethinyl estradiol (Junel FE 1/20) 1-20 MG-MCG per tablet, Take 1 tablet by mouth daily, Disp: 90 tablet, Rfl: 1  •  amitriptyline (ELAVIL) 50 mg tablet, Take 1 tablet (50 mg total) by mouth daily at bedtime, Disp: 30 tablet, Rfl: 1  •  multivitamin (THERAGRAN) TABS, Take 1 tablet by mouth daily (Patient not taking: Reported on 1/17/2023), Disp: , Rfl:           • Whom besides the patient is providing clinical information about today's encounter?   o NO ADDITIONAL HISTORIAN (patient alone provided history)    Physical Exam and Assessment/Plan by Diagnosis:

## 2023-02-01 NOTE — PROGRESS NOTES
[address] [facility] [address]  [phone]  [phone]     MRN: [id number] : 1985  Encounter: [id number]  Patient Information: Ellis Gibbs  Chief complaint: Increased sweating under her arms years and also a rash that she was told was eczema under her breasts and overall checkup    History of present illness: 76-year-old female presents secondary to problems with pain that been going on for years patient has not really been treating this except for over-the-counter treatments  Deodorants sometimes exacerbates the process  Patient told the rash under her breast was eczema has been using clobetasol intermittently for this process  Past Medical History:   Diagnosis Date   • Anxiety    • Bipolar disorder ([address] )    • Depression    • OCD (obsessive compulsive disorder)      Past Surgical History:   Procedure Laterality Date   • BRAIN SURGERY     • KNEE SURGERY       Social History   Social History     Substance and Sexual Activity   Alcohol Use Not Currently     Social History     Substance and Sexual Activity   Drug Use Yes   • Types: Marijuana     Social History     Tobacco Use   Smoking Status Every Day   • Packs/day: 0 50   • Types: Cigarettes   Smokeless Tobacco Never     No family history on file  Meds/Allergies   Allergies   Allergen Reactions   • Pregabalin Rash and Swelling   • Fentanyl Hives     Fever and migraine     • Nitrofurantoin Swelling   • Prozac [Fluoxetine] Confusion   • Acetaminophen Rash   • Cortisone Rash and Swelling   • Hydrocodone-Acetaminophen Rash       Meds:  Prior to Admission medications    Medication Sig Start Date End Date Taking?  Authorizing Provider   celecoxib (CeleBREX) 100 mg capsule Take 1 capsule (100 mg total) by mouth 2 (two) times a day 23 Yes Diane Johnson DO   clonazePAM (KlonoPIN) 2 mg tablet Take 1 tablet (2 mg total) by mouth 2 (two) times a day 1/11/23 2/10/23 Yes Lawrence Quintanilla, DO   levothyroxine (Euthyrox) 25 mcg tablet Take 1 tablet (25 mcg total) by mouth daily in the early morning 1/20/23 2/19/23 Yes Lawrence Quintanilla, DO   norethindrone-ethinyl estradiol (Junel FE 1/20) 1-20 MG-MCG per tablet Take 1 tablet by mouth daily 1/17/23 2/16/23 Yes Lawrence Quintanilla, DO   amitriptyline (ELAVIL) 50 mg tablet Take 1 tablet (50 mg total) by mouth daily at bedtime 1/17/23   Lawrence Quintanilla, DO   multivitamin [facility]) TABS Take 1 tablet by mouth daily  Patient not taking: Reported on 1/17/2023    Historical Provider, MD       Subjective:     Review of Systems:    General: negative for - chills, fatigue, fever,  weight gain or weight loss  Psychological: negative for - anxiety, behavioral disorder, concentration difficulties, decreased libido, depression, irritability, memory difficulties, mood swings, sleep disturbances or suicidal ideation  ENT: negative for - hearing difficulties , nasal congestion, nasal discharge, oral lesions, sinus pain, sneezing, sore throat  Allergy and Immunology: negative for - hives, insect bite sensitivity,  Hematological and Lymphatic: negative for - bleeding problems, blood clots,bruising, swollen lymph nodes  Endocrine: negative for - hair pattern changes, hot flashes, malaise/lethargy, mood swings, palpitations, polydipsia/polyuria, skin changes, temperature intolerance or unexpected weight change  Respiratory: negative for - cough, hemoptysis, orthopnea, shortness of breath, or wheezing  Cardiovascular: negative for - chest pain, dyspnea on exertion, edema,  Gastrointestinal: negative for - abdominal pain, nausea/vomiting  Genito-Urinary: negative for - dysuria, incontinence, irregular/heavy menses or urinary frequency/urgency  Musculoskeletal: negative for - gait disturbance, joint pain, joint stiffness, joint swelling, muscle pain, muscular weakness  Dermatological:  As in HPI  Neurological: negative for confusion, dizziness, headaches, impaired coordination/balance, memory loss, numbness/tingling, seizures, speech problems, tremors or weakness       Objective:   Ht 5' 10" (1 778 m)   Wt 57 2 kg (126 lb)   BMI 18 08 kg/m²     Physical Exam:    General Appearance:    Alert, cooperative, no distress   Head:    Normocephalic, without obvious abnormality, atraumatic           Skin:   A full skin exam was performed including scalp, head scalp, eyes, ears, nose, lips, neck, chest, axilla, abdomen, back, buttocks, bilateral upper extremities, bilateral lower extremities, hands, feet, fingers, toes, fingernails, and toenails macules noted in the inframammary area TIM prep was performed and positive definitive signs of hyperhidrosis noted at this point else of concern noted on exam     Assessment:     1  Hyperhidrosis of axilla  aluminum chloride (DRYSOL) 20 % external solution    ketoconazole (NIZORAL) 2 % shampoo      2  Tinea versicolor        3  Screening for skin condition              Plan:   Hyperhidrosis we will go ahead and treat with Drysol to see if this will help control the patient to apply it every night on skin and has not been shaving until improved and then cut back as needed  TINEA VERSICOLOR    •       What is tinea versicolor? Tinea versicolor or pityriasis versicolor is a type of fungal infection on the skin due to a yeast that lives on all of us  It Is due an overgrowth of a type of yeast called Malassezia furfur, which feeds on oils in the skin and thrives in warm, humid environments  Anyone can develop tinea versicolor, but it is more common during the summer months and in tropical climates  Those who tend to sweat more heavily are also at higher risk  Although it is not considered infectious, multiple family members can be affected     - Teens and young adults are most susceptible due to having oily skin   - Affects people of all skin colors   - Weakened immune system predisposes to development What are the clinical symptoms of tinea versicolor? The first sign of tinea versicolor is often spots on the skin  They can be lighter or darker than surrounding skin, with colors ranging from white, pink, tan, to brown  - The spots are dry, scaly, and sometimes itchy   - Can appear anywhere on the body, but more commonly over the neck, trunk, and arms   - Spots can grow together forming larger patches   - May disappear when temperature drops and return once it becomes warm again  - Pale spots can be confused with vitiligo    How do we diagnose tinea versicolor? Tinea versicolor is usually diagnosed with a history and physical examination  However, the following tests may be useful for confirmation when in doubt  - Wood lamp (black light) examination-- yellow-green glow may be observed in affected areas  - Microscopy using potassium hydroxide (KOH) to examine skin scrapings  - Fungal culture--this is usually reported to be negative, as it is quite difficult to persuade the yeasts to grow in a laboratory  - Skin biopsy--fungal elements may be seen within the outer cells of the skin (stratum corneum) on histopathology    How do we treat tinea versicolor? There are many different options to treat tinea versicolor  The treatment chosen may depend on how thick the spots have grown and how much of the body has been affected  Mild tinea versicolor can be treated with primarily topical antifungal agents   These include:  - Ketoconazole cream/shampoo  - Selenium sulfide   - Terbinafine gel   - Ciclopirox cream/solution   - Propylene glycol solution   - Sodium thiosulphate solution   Topical medications should be applied widely to affected areas before bedtime for between three days to two weeks depending on your dermatologists recommendation    - Use of medicated cleansers once or twice a month may prevent recurrence in those who have has multiple bouts of yeast overgrowth     For extensive skin involvement or after failure of topical medications, oral antifungal agents such as itraconazole and fluconazole can be used  Oral terbinafine used to treat dermatophyte infections is not effective against tinea versicolor    - Vigorous exercise an hour after taking the medication may help sweat it onto the skin surface and enhance clearance of the yeast  - Screening for Dermatologic Disorders: Nothing else of concern noted on complete exam follow up in 1 year   -       Marquise Michele MD  2/1/2023,11:08 AM    Portions of the record may have been created with voice recognition software   Occasional wrong word or "sound a like" substitutions may have occurred due to the inherent limitations of voice recognition software   Read the chart carefully and recognize, using context, where substitutions have occurred

## 2023-02-01 NOTE — PATIENT INSTRUCTIONS
Hyperhidrosis we will go ahead and treat with Drysol to see if this will help control the patient to apply it every night on skin and has not been shaving until improved and then cut back as needed  TINEA VERSICOLOR          What is tinea versicolor? Tinea versicolor or pityriasis versicolor is a type of fungal infection on the skin due to a yeast that lives on all of us  It Is due an overgrowth of a type of yeast called Malassezia furfur, which feeds on oils in the skin and thrives in warm, humid environments  Anyone can develop tinea versicolor, but it is more common during the summer months and in tropical climates  Those who tend to sweat more heavily are also at higher risk  Although it is not considered infectious, multiple family members can be affected  Teens and young adults are most susceptible due to having oily skin   Affects people of all skin colors   Weakened immune system predisposes to development     What are the clinical symptoms of tinea versicolor? The first sign of tinea versicolor is often spots on the skin  They can be lighter or darker than surrounding skin, with colors ranging from white, pink, tan, to brown  The spots are dry, scaly, and sometimes itchy   Can appear anywhere on the body, but more commonly over the neck, trunk, and arms   Spots can grow together forming larger patches   May disappear when temperature drops and return once it becomes warm again  Pale spots can be confused with vitiligo    How do we diagnose tinea versicolor? Tinea versicolor is usually diagnosed with a history and physical examination  However, the following tests may be useful for confirmation when in doubt    Wood lamp (black light) examination-- yellow-green glow may be observed in affected areas  Microscopy using potassium hydroxide (KOH) to examine skin scrapings  Fungal culture--this is usually reported to be negative, as it is quite difficult to persuade the yeasts to grow in a laboratory  Skin biopsy--fungal elements may be seen within the outer cells of the skin (stratum corneum) on histopathology    How do we treat tinea versicolor? There are many different options to treat tinea versicolor  The treatment chosen may depend on how thick the spots have grown and how much of the body has been affected  Mild tinea versicolor can be treated with primarily topical antifungal agents  These include:  Ketoconazole cream/shampoo  Selenium sulfide   Terbinafine gel   Ciclopirox cream/solution   Propylene glycol solution   Sodium thiosulphate solution   Topical medications should be applied widely to affected areas before bedtime for between three days to two weeks depending on your dermatologists recommendation  Use of medicated cleansers once or twice a month may prevent recurrence in those who have has multiple bouts of yeast overgrowth     For extensive skin involvement or after failure of topical medications, oral antifungal agents such as itraconazole and fluconazole can be used  Oral terbinafine used to treat dermatophyte infections is not effective against tinea versicolor     Vigorous exercise an hour after taking the medication may help sweat it onto the skin surface and enhance clearance of the yeast  Screening for Dermatologic Disorders: Nothing else of concern noted on complete exam follow up in 1 year

## 2023-02-08 DIAGNOSIS — F41.1 GAD (GENERALIZED ANXIETY DISORDER): ICD-10-CM

## 2023-02-10 RX ORDER — CLONAZEPAM 2 MG/1
2 TABLET ORAL 2 TIMES DAILY
Qty: 60 TABLET | Refills: 0 | Status: SHIPPED | OUTPATIENT
Start: 2023-02-10 | End: 2023-03-12

## 2023-02-23 ENCOUNTER — TELEPHONE (OUTPATIENT)
Dept: DERMATOLOGY | Facility: CLINIC | Age: 38
End: 2023-02-23

## 2023-02-23 NOTE — TELEPHONE ENCOUNTER
Aluminum chloride Drysol  She is highly allergic to it   Burns ,itchy red, swelling and very painful    WILL LIKE SOMETHING DIFFERENT   [address] [place] [address] [place]   [phone]

## 2023-02-27 DIAGNOSIS — E03.9 ACQUIRED HYPOTHYROIDISM: ICD-10-CM

## 2023-02-27 RX ORDER — LEVOTHYROXINE SODIUM 0.03 MG/1
25 TABLET ORAL
Qty: 30 TABLET | Refills: 0 | Status: SHIPPED | OUTPATIENT
Start: 2023-02-27 | End: 2023-03-29

## 2023-02-27 NOTE — TELEPHONE ENCOUNTER
Patient also stated that she would like another medication other than the ketoconazole shampoo  She said she only showers every other day and with her son, so she doesn't want to get that shampoo on him  Is there anything else besides the two medications that can be prescribed?

## 2023-03-01 DIAGNOSIS — L74.510 HYPERHIDROSIS OF AXILLA: Primary | ICD-10-CM

## 2023-03-08 DIAGNOSIS — F41.1 GAD (GENERALIZED ANXIETY DISORDER): ICD-10-CM

## 2023-03-09 RX ORDER — CLONAZEPAM 2 MG/1
2 TABLET ORAL 2 TIMES DAILY
Qty: 60 TABLET | Refills: 0 | Status: SHIPPED | OUTPATIENT
Start: 2023-03-09 | End: 2023-03-11

## 2023-03-10 ENCOUNTER — TELEPHONE (OUTPATIENT)
Dept: INTERNAL MEDICINE CLINIC | Facility: CLINIC | Age: 38
End: 2023-03-10

## 2023-03-10 DIAGNOSIS — F41.1 GAD (GENERALIZED ANXIETY DISORDER): ICD-10-CM

## 2023-03-10 RX ORDER — CLONAZEPAM 2 MG/1
2 TABLET ORAL 2 TIMES DAILY
Qty: 60 TABLET | Refills: 0 | Status: CANCELLED | OUTPATIENT
Start: 2023-03-10 | End: 2023-04-09

## 2023-03-10 NOTE — TELEPHONE ENCOUNTER
PT  SAID  THE   RX  FROM YESTERDAY   KLONOPIN  2 MG  THE  PHARM  NEVER   RECEIVE    WANTS  TO  KNOW  IF  IT  CAN  BE  RE  SENT      PT   WOULD  LIKE  A  CALL  BACK  THAT  THE  RX  WAS  RESENT   PLEASE related to acute illness

## 2023-03-11 RX ORDER — CLONAZEPAM 2 MG/1
2 TABLET ORAL 2 TIMES DAILY
Qty: 60 TABLET | Refills: 0 | Status: SHIPPED | OUTPATIENT
Start: 2023-03-11 | End: 2023-04-10

## 2023-05-08 DIAGNOSIS — F41.1 GAD (GENERALIZED ANXIETY DISORDER): ICD-10-CM

## 2023-05-09 RX ORDER — CLONAZEPAM 2 MG/1
2 TABLET ORAL 2 TIMES DAILY
Qty: 60 TABLET | Refills: 0 | Status: SHIPPED | OUTPATIENT
Start: 2023-05-10 | End: 2023-06-09

## 2023-06-07 DIAGNOSIS — E03.9 ACQUIRED HYPOTHYROIDISM: ICD-10-CM

## 2023-06-07 DIAGNOSIS — F41.1 GAD (GENERALIZED ANXIETY DISORDER): ICD-10-CM

## 2023-06-08 RX ORDER — LEVOTHYROXINE SODIUM 0.03 MG/1
25 TABLET ORAL
Qty: 30 TABLET | Refills: 0 | Status: SHIPPED | OUTPATIENT
Start: 2023-06-08 | End: 2023-07-08

## 2023-06-08 RX ORDER — CLONAZEPAM 2 MG/1
2 TABLET ORAL 2 TIMES DAILY
Qty: 60 TABLET | Refills: 0 | Status: SHIPPED | OUTPATIENT
Start: 2023-06-08 | End: 2023-07-08

## 2023-07-06 DIAGNOSIS — F41.1 GAD (GENERALIZED ANXIETY DISORDER): ICD-10-CM

## 2023-07-06 RX ORDER — CLONAZEPAM 2 MG/1
2 TABLET ORAL 2 TIMES DAILY
Qty: 60 TABLET | Refills: 0 | Status: SHIPPED | OUTPATIENT
Start: 2023-07-07 | End: 2023-08-09 | Stop reason: SDUPTHER

## 2023-07-17 DIAGNOSIS — Z30.41 ENCOUNTER FOR SURVEILLANCE OF CONTRACEPTIVE PILLS: ICD-10-CM

## 2023-07-17 RX ORDER — NORETHINDRONE ACETATE AND ETHINYL ESTRADIOL AND FERROUS FUMARATE 1MG-20(21)
KIT ORAL
Qty: 84 TABLET | Refills: 0 | Status: SHIPPED | OUTPATIENT
Start: 2023-07-17

## 2023-07-24 ENCOUNTER — OFFICE VISIT (OUTPATIENT)
Age: 38
End: 2023-07-24
Payer: COMMERCIAL

## 2023-07-24 VITALS
RESPIRATION RATE: 18 BRPM | TEMPERATURE: 97.5 F | SYSTOLIC BLOOD PRESSURE: 100 MMHG | BODY MASS INDEX: 18.91 KG/M2 | OXYGEN SATURATION: 96 % | HEART RATE: 82 BPM | DIASTOLIC BLOOD PRESSURE: 62 MMHG | WEIGHT: 131.8 LBS

## 2023-07-24 DIAGNOSIS — J06.9 UPPER RESPIRATORY TRACT INFECTION, UNSPECIFIED TYPE: ICD-10-CM

## 2023-07-24 DIAGNOSIS — E03.9 HYPOTHYROIDISM, UNSPECIFIED TYPE: Primary | ICD-10-CM

## 2023-07-24 DIAGNOSIS — M54.9 CHRONIC NECK AND BACK PAIN: ICD-10-CM

## 2023-07-24 DIAGNOSIS — G89.29 CHRONIC NECK AND BACK PAIN: ICD-10-CM

## 2023-07-24 DIAGNOSIS — M54.2 CHRONIC NECK AND BACK PAIN: ICD-10-CM

## 2023-07-24 DIAGNOSIS — E03.9 ACQUIRED HYPOTHYROIDISM: ICD-10-CM

## 2023-07-24 PROCEDURE — 99214 OFFICE O/P EST MOD 30 MIN: CPT | Performed by: FAMILY MEDICINE

## 2023-07-24 RX ORDER — LEVOTHYROXINE SODIUM 0.05 MG/1
50 TABLET ORAL
Qty: 30 TABLET | Refills: 0 | Status: SHIPPED | OUTPATIENT
Start: 2023-07-24 | End: 2023-08-23

## 2023-07-24 RX ORDER — TIZANIDINE 2 MG/1
TABLET ORAL
COMMUNITY
Start: 2023-07-10

## 2023-07-24 RX ORDER — HYOSCYAMINE SULFATE 0.12 MG/1
0.12 TABLET SUBLINGUAL EVERY 4 HOURS PRN
COMMUNITY

## 2023-07-24 RX ORDER — TRAMADOL HYDROCHLORIDE 50 MG/1
TABLET ORAL
COMMUNITY
Start: 2023-07-21

## 2023-07-24 RX ORDER — PENICILLIN V POTASSIUM 500 MG/1
500 TABLET ORAL 2 TIMES DAILY
Qty: 14 TABLET | Refills: 0 | Status: SHIPPED | OUTPATIENT
Start: 2023-07-24 | End: 2023-07-31

## 2023-07-24 NOTE — PROGRESS NOTES
Name: Tra Stanton      : 1985      MRN: [id number]  Encounter Provider: Bro Mukherjee DO  Encounter Date: 2023   Encounter department: [address] [facility]     1. Hypothyroidism, unspecified type- not controlled. Pt on 25mcg of the levothyroxine and she was suppose to be on 50mcg due to persistent elevation of tsh. She attest to fatigue and weight gain. Possible 2/2 disease state. Pt to start 50mcg qd. Will obtain f/u TSH as well. 2. Upper respiratory tract infection, unspecified type  -     penicillin V potassium (VEETID) 500 mg tablet; Take 1 tablet (500 mg total) by mouth 2 (two) times a day for 7 days    3. Chronic neck and back pain  -     Ambulatory Referral to Pain Management; Future           Subjective      Pt presents with CAT scan  Of the neck. Ordered by [facility] physiatry. On the cat scan showed diffused enlargement of thyroid indicative of chronic disease. Pt reports taking her levothyroxine. Only on 25mcg. Reports uri symptoms. Uyen Cheeks had strep. Wants to see pain management in the net work again. Review of Systems   Constitutional: Negative for fever. HENT: Positive for congestion, rhinorrhea and sore throat. Respiratory: Negative for shortness of breath. Current Outpatient Medications on File Prior to Visit   Medication Sig   • clonazePAM (KlonoPIN) 2 mg tablet Take 1 tablet (2 mg total) by mouth 2 (two) times a day Do not start before 2023. • Jamilah REHMAN  1-20 MG-MCG per tablet TAKE 1 TABLET BY MOUTH DAILY   • Hyoscyamine Sulfate SL 0.125 MG SUBL Take 0.125 mg by mouth every 4 (four) hours as needed   • levothyroxine (Euthyrox) 25 mcg tablet Take 1 tablet (25 mcg total) by mouth daily in the early morning   • tiZANidine (ZANAFLEX) 2 mg tablet TAKE 1 TABLET (2 MG TOTAL) BY MOUTH 2 (TWO) TIMES A DAY AS NEEDED FOR MUSCLE SPASMS.    • traMADol (ULTRAM) 50 mg tablet TAKE 2 TABLETS (100 MG TOTAL) BY MOUTH 2 (TWO) TIMES A DAY AS NEEDED FOR MODERATE PAIN (PAIN SCORE 4-6). • aluminum chloride (DRYSOL) 20 % external solution Apply topically daily at bedtime (Patient not taking: Reported on 7/24/2023)   • amitriptyline (ELAVIL) 50 mg tablet Take 1 tablet (50 mg total) by mouth daily at bedtime   • celecoxib (CeleBREX) 100 mg capsule Take 1 capsule (100 mg total) by mouth 2 (two) times a day (Patient not taking: Reported on 7/24/2023)   • Glycopyrronium Tosylate 2.4 % PADS Apply 1 application topically in the morning (Patient not taking: Reported on 7/24/2023)   • ketoconazole (NIZORAL) 2 % shampoo Apply 1 application topically once a week Wash from head to thighs leave on 5 minutes and rinse weekly (Patient not taking: Reported on 7/24/2023)   • multivitamin (THERAGRAN) TABS Take 1 tablet by mouth daily (Patient not taking: Reported on 1/17/2023)       Objective     /62 (BP Location: Left arm, Patient Position: Sitting, Cuff Size: Standard)   Pulse 82   Temp 97.5 °F (36.4 °C) (Temporal)   Resp 18   Wt 59.8 kg (131 lb 12.8 oz)   SpO2 96%   BMI 18.91 kg/m²     Physical Exam  HENT:      Head: Normocephalic. Right Ear: External ear normal.      Left Ear: External ear normal.      Nose: Congestion present. Mouth/Throat:      Mouth: Mucous membranes are dry. Pharynx: Posterior oropharyngeal erythema present. Eyes:      Conjunctiva/sclera: Conjunctivae normal.   Cardiovascular:      Rate and Rhythm: Normal rate. Pulmonary:      Effort: Pulmonary effort is normal.   Neurological:      Mental Status: She is alert and oriented to person, place, and time.    Psychiatric:         Attention and Perception: Attention normal.         Mood and Affect: Mood normal.       Cindy Ramirez,

## 2023-08-08 ENCOUNTER — TELEPHONE (OUTPATIENT)
Age: 38
End: 2023-08-08

## 2023-08-08 DIAGNOSIS — F41.1 GAD (GENERALIZED ANXIETY DISORDER): ICD-10-CM

## 2023-08-08 NOTE — TELEPHONE ENCOUNTER
Pt requesting another script for penicillin; since she's still not feeling well  She saw Dr Cesar Hare on: 7/24

## 2023-08-09 DIAGNOSIS — Z79.891 CHRONIC PRESCRIPTION OPIATE USE: Primary | ICD-10-CM

## 2023-08-09 DIAGNOSIS — F41.1 GAD (GENERALIZED ANXIETY DISORDER): ICD-10-CM

## 2023-08-10 ENCOUNTER — TELEPHONE (OUTPATIENT)
Age: 38
End: 2023-08-10

## 2023-08-10 RX ORDER — CLONAZEPAM 2 MG/1
2 TABLET ORAL 2 TIMES DAILY
Qty: 60 TABLET | Refills: 0 | OUTPATIENT
Start: 2023-08-10 | End: 2023-09-09

## 2023-08-10 RX ORDER — NALOXONE HYDROCHLORIDE 4 MG/.1ML
SPRAY NASAL
Qty: 1 EACH | Refills: 1 | Status: SHIPPED | OUTPATIENT
Start: 2023-08-10 | End: 2024-08-09

## 2023-08-10 RX ORDER — CLONAZEPAM 2 MG/1
2 TABLET ORAL 2 TIMES DAILY
Qty: 60 TABLET | Refills: 0 | Status: SHIPPED | OUTPATIENT
Start: 2023-08-10 | End: 2023-09-09

## 2023-08-10 NOTE — TELEPHONE ENCOUNTER
Pt is requiring about her Clonazepam.. she called on Monday. I see it's pending. Can this be done today?   Call pt back either way [phone]

## 2023-08-11 ENCOUNTER — TELEPHONE (OUTPATIENT)
Age: 38
End: 2023-08-11

## 2023-08-11 DIAGNOSIS — R63.4 RECENT UNEXPLAINED WEIGHT LOSS: Primary | ICD-10-CM

## 2023-08-11 DIAGNOSIS — R10.84 GENERALIZED ABDOMINAL PAIN: ICD-10-CM

## 2023-08-11 NOTE — TELEPHONE ENCOUNTER
I have placed a referral to the gastrointestinal doctor since this issue has been going on for months now.

## 2023-08-11 NOTE — TELEPHONE ENCOUNTER
Patient had appt today and needed to cancel had no one to care for baby.  She is still having a lot of abdominal pain made an appt for Monday 8/14 wanted to know if someone could call her before the weekend is there anything that the doctor could call in for her was just seen 7/24

## 2023-08-14 ENCOUNTER — OFFICE VISIT (OUTPATIENT)
Dept: GASTROENTEROLOGY | Facility: CLINIC | Age: 38
End: 2023-08-14
Payer: COMMERCIAL

## 2023-08-14 VITALS
SYSTOLIC BLOOD PRESSURE: 126 MMHG | DIASTOLIC BLOOD PRESSURE: 84 MMHG | HEART RATE: 80 BPM | WEIGHT: 127.4 LBS | HEIGHT: 70 IN | BODY MASS INDEX: 18.24 KG/M2

## 2023-08-14 DIAGNOSIS — R63.4 WEIGHT LOSS: ICD-10-CM

## 2023-08-14 DIAGNOSIS — R10.84 GENERALIZED ABDOMINAL PAIN: Primary | ICD-10-CM

## 2023-08-14 DIAGNOSIS — K21.9 GASTROESOPHAGEAL REFLUX DISEASE WITHOUT ESOPHAGITIS: ICD-10-CM

## 2023-08-14 PROCEDURE — 99244 OFF/OP CNSLTJ NEW/EST MOD 40: CPT | Performed by: INTERNAL MEDICINE

## 2023-08-14 RX ORDER — TRAMADOL HYDROCHLORIDE 50 MG/1
100 TABLET ORAL 2 TIMES DAILY PRN
COMMUNITY
Start: 2023-07-21

## 2023-08-14 RX ORDER — OMEPRAZOLE 40 MG/1
40 CAPSULE, DELAYED RELEASE ORAL 2 TIMES DAILY
Qty: 60 CAPSULE | Refills: 0 | Status: SHIPPED | OUTPATIENT
Start: 2023-08-14

## 2023-08-14 RX ORDER — HYOSCYAMINE SULFATE 0.125 MG
TABLET,DISINTEGRATING ORAL
COMMUNITY
Start: 2023-08-01

## 2023-08-14 RX ORDER — DICLOFENAC SODIUM 75 MG/1
TABLET, DELAYED RELEASE ORAL
COMMUNITY
Start: 2023-07-12

## 2023-08-14 NOTE — PATIENT INSTRUCTIONS
Scheduled date of EGD/colonoscopy (as of today):9/5/23  Physician performing EGD/colonoscopy:Lamont  Location of EGD/colonoscopy:[facility]  Desired bowel prep reviewed with patient:Alberto/Miralax  Instructions reviewed with patient by:Yg vance  Clearances:   none

## 2023-08-14 NOTE — PROGRESS NOTES
[facility] - Outpatient Note  Michael Gomez 44 y.o. female MRN: [id number]  Encounter: [id number]      ASSESSMENT AND PLAN:    Michael Gomez is a 44 y.o. old pleasant female with PMH of bipolar disorder, general anxiety disorder, who presents for consultation for abdominal pain    Abdominal pain, weight loss-patient reporting severe abdominal pain causing difficulty eating and with weight loss. This is affecting her quality of life significantly. There does appear to be a functional component with possible drug-seeking behavior as she was asking about me prescribing "stronger" medications. I let her know that I would not prescribe narcotics as this can worsen pain depending on his etiology. It is important for us to find etiology before prescribing medications with abuse potential.  Differential is broad right now but includes gastritis, dyspepsia, functional disorder, gastroparesis, constipation, among other causes. · Schedule EGD/colonoscopy. We will plan for biopsies of H. pylori and celiac disease. · Start omeprazole 40 mg twice daily  · Check CAT scan and right upper quadrant ultrasound  · Check CBC, CMP and lipase  · Continue hyoscyamine. · We will avoid narcotics  · Consider gastric emptying study in the future  · Consider HIDA scan in the future  · Consider musculoskeletal treatment as much of her pain is positional  · Consider higher dose Elavil in the future for functional pain        1. Generalized abdominal pain  - Ambulatory Referral to Gastroenterology  - CT chest abdomen pelvis w contrast; Future  - EGD; Future  - omeprazole (PriLOSEC) 40 MG capsule; Take 1 capsule (40 mg total) by mouth 2 (two) times a day  Dispense: 60 capsule; Refill: 0  - Colonoscopy; Future  - US right upper quadrant; Future  - Lipase; Future  - CBC and differential; Future  - Comprehensive metabolic panel; Future  - Lipase  - CBC and differential  - Comprehensive metabolic panel    2. Gastroesophageal reflux disease without esophagitis    - EGD; Future    3. Weight loss    - Colonoscopy; Future    ______________________________________________________________________    SUBJECTIVE:  Andreas Austin is a 44 y.o. old pleasant female with PMH of bipolar disorder, general anxiety disorder, who presents for consultation for abdominal pain. Patient reports for the past month she has noticed severe 10 out of 10 stabbing abdominal pain in the generalized abdomen associate with nausea but no vomiting. She has difficulty gaining weight and states she has lost 20 pounds since giving birth to her 2-3/1year-old child. She does report a longstanding history of GERD though this is not bothersome now. She reports regular bowel movements every day with complete evacuation. Denies dysphagia or odynophagia or rectal bleeding. She has never had EGD before. She does report colonoscopy 1/2 years ago when she had a abdominal pain that was normal however she states this pain is significantly different than when she had a colonoscopy in 2022 at outside facility. She denies illicit drugs or alcohol or smoking. No family history of GI cancer.       Answers for HPI/ROS submitted by the patient on 8/11/2023  Chronicity: chronic  Onset: more than 1 year ago  Onset quality: sudden  Frequency: constantly  Episode duration: 24 Months  Progression since onset: rapidly worsening  Pain location: generalized abdominal region  Pain - numeric: 10/10  Pain quality: aching, burning, cramping, dull, a sensation of fullness, sharp, tearing  Radiates to: LLQ, LUQ, RLQ, RUQ, epigastric region, periumbilical region, suprapubic region  anorexia: Yes  arthralgias: Yes  belching: No  constipation: No  diarrhea: No  dysuria: No  fever: No  flatus: No  frequency: No  headaches: Yes  hematochezia: No  hematuria: No  melena: No  myalgias: Yes  nausea: Yes  weight loss: No  vomiting: Yes  Aggravated by: certain positions, coughing, deep breathing, eating, movement, palpation  Relieved by: nothing  Diagnostic workup: GI consult, lower endoscopy, surgery        I reviewed prior external notes    I reviewed previous lab results and images      REVIEW OF SYSTEMS:     REVIEW OF ALL OTHER SYSTEMS IS OTHERWISE NEGATIVE. Historical Information   Past Medical History:   Diagnosis Date   • Anxiety    • Bipolar disorder ([address])    • Depression    • OCD (obsessive compulsive disorder)      Past Surgical History:   Procedure Laterality Date   • BRAIN SURGERY     • KNEE SURGERY       Social History   Social History     Substance and Sexual Activity   Alcohol Use Not Currently     Social History     Substance and Sexual Activity   Drug Use Yes   • Types: Marijuana     Social History     Tobacco Use   Smoking Status Every Day   • Packs/day: 0.50   • Types: Cigarettes   Smokeless Tobacco Never     History reviewed. No pertinent family history. Meds/Allergies       Current Outpatient Medications:   •  clonazePAM (KlonoPIN) 2 mg tablet  •  diclofenac (VOLTAREN) 75 mg EC tablet  •  Jamilah FE 1/20 1-20 MG-MCG per tablet  •  hyoscyamine (ANASPAZ) 0.125 mg  •  Hyoscyamine Sulfate SL 0.125 MG SUBL  •  levothyroxine (Euthyrox) 50 mcg tablet  •  naloxone (NARCAN) 4 mg/0.1 mL nasal spray  •  omeprazole (PriLOSEC) 40 MG capsule  •  tiZANidine (ZANAFLEX) 2 mg tablet  •  traMADol (ULTRAM) 50 mg tablet  •  traMADol (ULTRAM) 50 mg tablet  •  aluminum chloride (DRYSOL) 20 % external solution  •  celecoxib (CeleBREX) 100 mg capsule  •  Glycopyrronium Tosylate 2.4 % PADS  •  ketoconazole (NIZORAL) 2 % shampoo  •  multivitamin (THERAGRAN) TABS    Allergies   Allergen Reactions   • Cortisone Rash and Swelling   • Fluoxetine Confusion and Other (See Comments)     Patient states she went "crazy, nuts." Unable to specify more clearly.    • Pregabalin Rash and Swelling   • Fentanyl Hives     Fever and migraine     • Nitrofurantoin Swelling   • Acetaminophen Rash   • Hydrocodone-Acetaminophen Rash           Objective     Blood pressure 126/84, pulse 80, height 5' 10" (1.778 m), weight 57.8 kg (127 lb 6.4 oz). Body mass index is 18.28 kg/m². PHYSICAL EXAM:      General Appearance:   Alert, cooperative, no distress   HEENT:   Normocephalic, atraumatic, anicteric. Neck:  Supple, symmetrical, trachea midline   Lungs:   Clear to auscultation bilaterally; no rales, rhonchi or wheezing; respirations unlabored    Heart[de-identified]   Regular rate and rhythm; no murmur, rub, or gallop. Abdomen:   Soft, non-tender, non-distended; normal bowel sounds; no masses, no organomegaly    Genitalia:   Deferred    Rectal:   Deferred    Extremities:  No cyanosis, clubbing or edema    Pulses:  2+ and symmetric    Skin:  No jaundice, rashes, or lesions    Lymph nodes:  No palpable cervical lymphadenopathy        Lab Results:   No visits with results within 1 Day(s) from this visit. Latest known visit with results is:   Appointment on 01/17/2023   Component Date Value   • TSH 3RD GENERATON 01/17/2023 4.970 (H)    • Sodium 01/17/2023 139    • Potassium 01/17/2023 4.4    • Chloride 01/17/2023 106    • CO2 01/17/2023 29    • ANION GAP 01/17/2023 4    • BUN 01/17/2023 15    • Creatinine 01/17/2023 1.00    • Glucose, Fasting 01/17/2023 93    • Calcium 01/17/2023 9.0    • eGFR 01/17/2023 71    • Free T4 01/17/2023 0.95          Radiology Results:   CT CERVICAL SPINE WO CONTRAST    Result Date: 7/18/2023  Narrative: EXAMINATION: CT cervical spine without contrast HISTORY: Cervicalgia COMPARISON: None available. TECHNIQUE: CT imaging of the cervical spine was obtained without contrast. 3-D post processed, surface rendered imaging of the cervical spine was also  obtained. FINDINGS: There is normal cervical lordosis. The vertebral bodies are normal in height and alignment. No fracture is identified. The craniocervical junction is normal in appearance. The atlantodental distance is not widened.  There is no prevertebral soft tissue swelling. Mild disc space narrowing at C4-C5 and C5-C6. C2-C3: No significant spinal canal or foraminal stenosis. C3-C4: Mild disc bulge with a superimposed tiny, partially calcified central disc protrusion. Bilateral uncovertebral hypertrophy. No significant spinal canal or foraminal stenosis. C4-C5: Broad, noncalcified disc bulge and osteophytic ridging. Bilateral uncovertebral hypertrophy. Mild spinal canal stenosis. No significant foraminal stenosis. C5-C6: Broad, noncalcified disc bulge and osteophytic ridging eccentric to the left. Bilateral uncovertebral hypertrophy. Moderate spinal canal stenosis. Mild to moderate bilateral foraminal stenosis. C6-C7: Mild, noncalcified disc bulge and osteophytic ridging. Bilateral uncovertebral hypertrophy. Mild spinal canal stenosis. No significant foraminal stenosis. C7-T1: No significant spinal canal or foraminal stenosis. The thyroid gland appears normal in size but is heterogeneously hypodense which can be seen with underlying chronic thyroid disease. Asymmetric soft tissue density in the left aspect of the vallecula. 8 mm sessile focus of soft tissue density in the left posterolateral aspect of the trachea at the thoracic inlet as well as a few additional subcentimeter foci of soft tissue density along the dependent surface of the upper trachea. Mild paraseptal and centrilobular emphysema. Impression: IMPRESSION: 1. No cervical spine fracture. 2. Spondylosis, as described above. 3. Areas of soft tissue density effacing the left aspect of the vallecula as well as within the trachea which presumably reflect secretions. Correlation with direct visualization is suggested to exclude mucosal lesions. 4. See above for additional chronic/incidental findings.   CT examination performed with dose lowering protocol in accordance with CATHY.  [id number]

## 2023-08-21 ENCOUNTER — APPOINTMENT (OUTPATIENT)
Dept: LAB | Facility: CLINIC | Age: 38
End: 2023-08-21
Payer: COMMERCIAL

## 2023-08-21 ENCOUNTER — HOSPITAL ENCOUNTER (OUTPATIENT)
Dept: ULTRASOUND IMAGING | Facility: CLINIC | Age: 38
Discharge: HOME | End: 2023-08-21
Payer: COMMERCIAL

## 2023-08-21 DIAGNOSIS — R10.84 GENERALIZED ABDOMINAL PAIN: ICD-10-CM

## 2023-08-21 DIAGNOSIS — R10.84 ABDOMINAL PAIN, GENERALIZED: Primary | ICD-10-CM

## 2023-08-21 LAB
ALBUMIN SERPL BCP-MCNC: 3.6 G/DL (ref 3.5–5)
ALP SERPL-CCNC: 31 U/L (ref 46–116)
ALT SERPL W P-5'-P-CCNC: 13 U/L (ref 12–78)
ANION GAP SERPL CALCULATED.3IONS-SCNC: 3 MMOL/L
AST SERPL W P-5'-P-CCNC: 12 U/L (ref 5–45)
BASOPHILS # BLD AUTO: 0.06 THOUSANDS/ÂΜL (ref 0–0.1)
BASOPHILS NFR BLD AUTO: 1 % (ref 0–1)
BILIRUB SERPL-MCNC: 0.29 MG/DL (ref 0.2–1)
BUN SERPL-MCNC: 11 MG/DL (ref 5–25)
CALCIUM SERPL-MCNC: 8.8 MG/DL (ref 8.3–10.1)
CHLORIDE SERPL-SCNC: 110 MMOL/L (ref 96–108)
CO2 SERPL-SCNC: 27 MMOL/L (ref 21–32)
CREAT SERPL-MCNC: 0.76 MG/DL (ref 0.6–1.3)
EOSINOPHIL # BLD AUTO: 0.12 THOUSAND/ÂΜL (ref 0–0.61)
EOSINOPHIL NFR BLD AUTO: 2 % (ref 0–6)
ERYTHROCYTE [DISTWIDTH] IN BLOOD BY AUTOMATED COUNT: 13.6 % (ref 11.6–15.1)
GFR SERPL CREATININE-BSD FRML MDRD: 99 ML/MIN/1.73SQ M
GLUCOSE P FAST SERPL-MCNC: 102 MG/DL (ref 65–99)
HCT VFR BLD AUTO: 43.1 % (ref 34.8–46.1)
HGB BLD-MCNC: 14.2 G/DL (ref 11.5–15.4)
IMM GRANULOCYTES # BLD AUTO: 0.01 THOUSAND/UL (ref 0–0.2)
IMM GRANULOCYTES NFR BLD AUTO: 0 % (ref 0–2)
LIPASE SERPL-CCNC: 158 U/L (ref 73–393)
LYMPHOCYTES # BLD AUTO: 1.39 THOUSANDS/ÂΜL (ref 0.6–4.47)
LYMPHOCYTES NFR BLD AUTO: 19 % (ref 14–44)
MCH RBC QN AUTO: 30.9 PG (ref 26.8–34.3)
MCHC RBC AUTO-ENTMCNC: 32.9 G/DL (ref 31.4–37.4)
MCV RBC AUTO: 94 FL (ref 82–98)
MONOCYTES # BLD AUTO: 0.54 THOUSAND/ÂΜL (ref 0.17–1.22)
MONOCYTES NFR BLD AUTO: 7 % (ref 4–12)
NEUTROPHILS # BLD AUTO: 5.26 THOUSANDS/ÂΜL (ref 1.85–7.62)
NEUTS SEG NFR BLD AUTO: 71 % (ref 43–75)
NRBC BLD AUTO-RTO: 0 /100 WBCS
PLATELET # BLD AUTO: 242 THOUSANDS/UL (ref 149–390)
PMV BLD AUTO: 12 FL (ref 8.9–12.7)
POTASSIUM SERPL-SCNC: 4.2 MMOL/L (ref 3.5–5.3)
PROT SERPL-MCNC: 7 G/DL (ref 6.4–8.4)
RBC # BLD AUTO: 4.6 MILLION/UL (ref 3.81–5.12)
SODIUM SERPL-SCNC: 140 MMOL/L (ref 135–147)
WBC # BLD AUTO: 7.38 THOUSAND/UL (ref 4.31–10.16)

## 2023-08-21 PROCEDURE — 36415 COLL VENOUS BLD VENIPUNCTURE: CPT

## 2023-08-21 PROCEDURE — 80053 COMPREHEN METABOLIC PANEL: CPT

## 2023-08-21 PROCEDURE — 83690 ASSAY OF LIPASE: CPT

## 2023-08-21 PROCEDURE — 76705 ECHO EXAM OF ABDOMEN: CPT

## 2023-08-21 PROCEDURE — 85025 COMPLETE CBC W/AUTO DIFF WBC: CPT

## 2023-08-24 DIAGNOSIS — Z12.11 SCREEN FOR COLON CANCER: ICD-10-CM

## 2023-08-24 DIAGNOSIS — K21.9 GASTROESOPHAGEAL REFLUX DISEASE WITHOUT ESOPHAGITIS: Primary | ICD-10-CM

## 2023-08-24 RX ORDER — POLYETHYLENE GLYCOL 3350 17 G/17G
238 POWDER, FOR SOLUTION ORAL DAILY
Qty: 238 G | Refills: 0 | Status: SHIPPED | OUTPATIENT
Start: 2023-08-24

## 2023-08-24 RX ORDER — BISACODYL 5 MG/1
5 TABLET, DELAYED RELEASE ORAL DAILY PRN
Qty: 4 TABLET | Refills: 0 | Status: SHIPPED | OUTPATIENT
Start: 2023-08-24

## 2023-08-24 NOTE — TELEPHONE ENCOUNTER
Patient is requesting Miralax and Dulcolax to be sent to her pharmacy, [facility], for her upcoming procedure. States that if a prescribe is sent to the pharmacy it will be covered.

## 2023-08-28 ENCOUNTER — TELEPHONE (OUTPATIENT)
Age: 38
End: 2023-08-28

## 2023-08-28 NOTE — TELEPHONE ENCOUNTER
Patients GI provider:  Dr. Ruby Hatfield    Number to return call:     Reason for call: Beata Rivers [facility] called because she needs clinicals for the prior authorization for the pt's CT scan. She needs office notes, diagnosis, treatments, and and test results. She also needs any conservative treatments the pt may have had.  She asks they be uploaded to the portal and make sure the pt's name and  is on everything    Scheduled procedure/appointment date if applicable: Procedure 67

## 2023-08-28 NOTE — TELEPHONE ENCOUNTER
Patients GI provider:  Dr. Massimo Dubose    Number to return call: [phone]    Reason for call: Pt calling asking for US results from 8/21. No results in chart please contact radiology and return patient's call.     Scheduled procedure/appointment date if applicable: Apt/procedure 9/5/23

## 2023-08-29 ENCOUNTER — TELEPHONE (OUTPATIENT)
Age: 38
End: 2023-08-29

## 2023-08-31 ENCOUNTER — TELEPHONE (OUTPATIENT)
Dept: GASTROENTEROLOGY | Facility: CLINIC | Age: 38
End: 2023-08-31

## 2023-08-31 NOTE — TELEPHONE ENCOUNTER
[facility]  The prior authorization request for this study has not been approved. You can schedule a peer to peer by calling [facility] [phone] the deadline date of MUST BE AT [facility] 9/1 -- it can be called in later however there must be a date/time scheduled for the p2p by tomorrow (per insurance guidelines)   Case # [id number]-        The insurance determined the following:     [ x] Does not meet Medical Necessity   [ ] No prior imaging   [ ] PT or conservative treatment incomplete   [ ] Geri Archer   [ ] Frequency     If you choose not to complete a peer to peer then please reply to this message to let us know. Please notify your patient and contact central scheduling by calling [phone] to cancel the appointment and cancel the order in Epic.

## 2023-09-05 ENCOUNTER — ANESTHESIA EVENT (OUTPATIENT)
Dept: GASTROENTEROLOGY | Facility: HOSPITAL | Age: 38
End: 2023-09-05

## 2023-09-05 ENCOUNTER — ANESTHESIA (OUTPATIENT)
Dept: GASTROENTEROLOGY | Facility: HOSPITAL | Age: 38
End: 2023-09-05

## 2023-09-05 ENCOUNTER — HOSPITAL ENCOUNTER (OUTPATIENT)
Dept: GASTROENTEROLOGY | Facility: HOSPITAL | Age: 38
Setting detail: OUTPATIENT SURGERY
Discharge: HOME | End: 2023-09-05
Attending: INTERNAL MEDICINE
Payer: COMMERCIAL

## 2023-09-05 VITALS
HEIGHT: 70 IN | TEMPERATURE: 98.1 F | BODY MASS INDEX: 17.39 KG/M2 | RESPIRATION RATE: 18 BRPM | HEART RATE: 62 BPM | SYSTOLIC BLOOD PRESSURE: 99 MMHG | DIASTOLIC BLOOD PRESSURE: 67 MMHG | OXYGEN SATURATION: 99 % | WEIGHT: 121.47 LBS

## 2023-09-05 DIAGNOSIS — R10.9 ABDOMINAL PAIN, UNSPECIFIED ABDOMINAL LOCATION: Primary | ICD-10-CM

## 2023-09-05 DIAGNOSIS — R63.4 WEIGHT LOSS: ICD-10-CM

## 2023-09-05 DIAGNOSIS — K21.9 GASTROESOPHAGEAL REFLUX DISEASE WITHOUT ESOPHAGITIS: ICD-10-CM

## 2023-09-05 DIAGNOSIS — R10.84 GENERALIZED ABDOMINAL PAIN: ICD-10-CM

## 2023-09-05 PROCEDURE — 88305 TISSUE EXAM BY PATHOLOGIST: CPT | Performed by: STUDENT IN AN ORGANIZED HEALTH CARE EDUCATION/TRAINING PROGRAM

## 2023-09-05 RX ORDER — SODIUM CHLORIDE, SODIUM LACTATE, POTASSIUM CHLORIDE, CALCIUM CHLORIDE 600; 310; 30; 20 MG/100ML; MG/100ML; MG/100ML; MG/100ML
INJECTION, SOLUTION INTRAVENOUS CONTINUOUS PRN
Status: DISCONTINUED | OUTPATIENT
Start: 2023-09-05 | End: 2023-09-05

## 2023-09-05 RX ORDER — PROPOFOL 10 MG/ML
INJECTION, EMULSION INTRAVENOUS AS NEEDED
Status: DISCONTINUED | OUTPATIENT
Start: 2023-09-05 | End: 2023-09-05

## 2023-09-05 RX ORDER — LIDOCAINE HYDROCHLORIDE 20 MG/ML
INJECTION, SOLUTION EPIDURAL; INFILTRATION; INTRACAUDAL; PERINEURAL AS NEEDED
Status: DISCONTINUED | OUTPATIENT
Start: 2023-09-05 | End: 2023-09-05

## 2023-09-05 RX ORDER — DICYCLOMINE HCL 20 MG
20 TABLET ORAL EVERY 6 HOURS
Qty: 120 TABLET | Refills: 0 | Status: SHIPPED | OUTPATIENT
Start: 2023-09-05

## 2023-09-05 RX ORDER — ALUMINA, MAGNESIA, AND SIMETHICONE 2400; 2400; 240 MG/30ML; MG/30ML; MG/30ML
10 SUSPENSION ORAL EVERY 6 HOURS PRN
Qty: 355 ML | Refills: 0 | Status: SHIPPED | OUTPATIENT
Start: 2023-09-05

## 2023-09-05 RX ADMIN — PROPOFOL 100 MG: 10 INJECTION, EMULSION INTRAVENOUS at 11:33

## 2023-09-05 RX ADMIN — PROPOFOL 30 MG: 10 INJECTION, EMULSION INTRAVENOUS at 11:46

## 2023-09-05 RX ADMIN — PROPOFOL 50 MG: 10 INJECTION, EMULSION INTRAVENOUS at 11:34

## 2023-09-05 RX ADMIN — SODIUM CHLORIDE, SODIUM LACTATE, POTASSIUM CHLORIDE, AND CALCIUM CHLORIDE: .6; .31; .03; .02 INJECTION, SOLUTION INTRAVENOUS at 11:29

## 2023-09-05 RX ADMIN — PROPOFOL 30 MG: 10 INJECTION, EMULSION INTRAVENOUS at 11:41

## 2023-09-05 RX ADMIN — PROPOFOL 20 MG: 10 INJECTION, EMULSION INTRAVENOUS at 11:39

## 2023-09-05 RX ADMIN — PROPOFOL 30 MG: 10 INJECTION, EMULSION INTRAVENOUS at 11:37

## 2023-09-05 RX ADMIN — LIDOCAINE HYDROCHLORIDE 60 MG: 20 INJECTION, SOLUTION EPIDURAL; INFILTRATION; INTRACAUDAL; PERINEURAL at 11:33

## 2023-09-05 NOTE — ANESTHESIA PREPROCEDURE EVALUATION
Procedure:  EGD  COLONOSCOPY    Relevant Problems   NEURO/PSYCH   (+) SHASHI (generalized anxiety disorder)      Other   (+) Bipolar 1 disorder (HCC)   (+) Nicotine abuse        Physical Exam    Airway    Mallampati score: I  TM Distance: >3 FB  Neck ROM: full     Dental   No notable dental hx     Cardiovascular      Pulmonary      Other Findings        Anesthesia Plan  ASA Score- 2     Anesthesia Type- IV sedation with anesthesia with ASA Monitors. Additional Monitors:   Airway Plan:           Plan Factors-Exercise tolerance (METS): >4 METS. Chart reviewed. Existing labs reviewed. Patient summary reviewed. Induction- intravenous. Postoperative Plan-     Informed Consent- Anesthetic plan and risks discussed with patient. I personally reviewed this patient with the CRNA. Discussed and agreed on the Anesthesia Plan with the CRNA. Annika Cevallos

## 2023-09-05 NOTE — ANESTHESIA POSTPROCEDURE EVALUATION
Post-Op Assessment Note    CV Status:  Stable    Pain management: adequate     Mental Status:  Sleepy   Hydration Status:  Euvolemic   PONV Controlled:  Controlled   Airway Patency:  Patent   Two or more mitigation strategies used for obstructive sleep apnea   Post Op Vitals Reviewed: Yes      Staff: Anesthesiologist, CRNA         No notable events documented.     BP   105/60   Temp   98   Pulse  70   Resp   18   SpO2   98

## 2023-09-05 NOTE — INTERVAL H&P NOTE
H&P reviewed. After examining the patient I find no changes in the patients condition since the H&P had been written.     Vitals:    09/05/23 0958   BP: 103/73   Pulse: 84   Resp: 15   Temp: 97.8 °F (36.6 °C)   SpO2: 99%

## 2023-09-08 DIAGNOSIS — M25.532 LEFT WRIST PAIN: ICD-10-CM

## 2023-09-08 DIAGNOSIS — F41.1 GAD (GENERALIZED ANXIETY DISORDER): ICD-10-CM

## 2023-09-08 DIAGNOSIS — R63.4 RECENT UNEXPLAINED WEIGHT LOSS: Primary | ICD-10-CM

## 2023-09-08 PROCEDURE — 88305 TISSUE EXAM BY PATHOLOGIST: CPT | Performed by: STUDENT IN AN ORGANIZED HEALTH CARE EDUCATION/TRAINING PROGRAM

## 2023-09-11 ENCOUNTER — NURSE TRIAGE (OUTPATIENT)
Age: 38
End: 2023-09-11

## 2023-09-11 ENCOUNTER — TELEPHONE (OUTPATIENT)
Age: 38
End: 2023-09-11

## 2023-09-11 RX ORDER — TIZANIDINE 2 MG/1
2 TABLET ORAL 2 TIMES DAILY
Qty: 60 TABLET | Refills: 0 | Status: SHIPPED | OUTPATIENT
Start: 2023-09-11

## 2023-09-11 RX ORDER — CLONAZEPAM 2 MG/1
2 TABLET ORAL 2 TIMES DAILY
Qty: 60 TABLET | Refills: 0 | Status: SHIPPED | OUTPATIENT
Start: 2023-09-11 | End: 2023-10-11

## 2023-09-11 NOTE — TELEPHONE ENCOUNTER
Patient states she is taking the dicyclomine (ordered post colon 9/5/23) at least three times a day for abdominal pain and she has cut the tablets in half and notes she still has no energy while on medication though it does help with symptoms.  Please advise

## 2023-09-11 NOTE — TELEPHONE ENCOUNTER
Regarding: medication questions  ----- Message from [facility] sent at 9/11/2023 12:12 PM EDT -----  Pt calling because she states the dicyclomine is working, but she states it makes her feel like a zombie.

## 2023-09-11 NOTE — TELEPHONE ENCOUNTER
Patient check with pharmacy needs her  clonazePAM (KlonoPIN) 2 mg tablet   I see it is still pendind

## 2023-09-11 NOTE — TELEPHONE ENCOUNTER
Called and spoke to patient . Gave patient message as per Dr Brayan Severino. Patient stated that she can not take the medication if there is nothing she can take to counter act side effects. Would like a script or something else to be called in. Please advise . Thank you !

## 2023-09-11 NOTE — TELEPHONE ENCOUNTER
She can take full dose before bedtime and half doses during day. Unfortunately there is nothing to counteract the side effects.

## 2023-09-11 NOTE — TELEPHONE ENCOUNTER
Called and spoke to patient. Gave patient message as per Andrzej Villalobos. Patient would still like something else called in. Please advise. Thank you !

## 2023-09-12 DIAGNOSIS — R10.84 GENERALIZED ABDOMINAL PAIN: Primary | ICD-10-CM

## 2023-09-12 NOTE — TELEPHONE ENCOUNTER
Called and spoke with patient. She states she tried Hyoscyamine in the past and it does not work for her. She states the Bentyl 20 mg works but causes her to feel lethargic. She tried taking half the dose and still is unable to tolerate it. She is requesting something else.

## 2023-09-12 NOTE — TELEPHONE ENCOUNTER
Routing to PA    Patient would like to try another medication even though it could have similar side effects. Please advise. Thank you!

## 2023-09-12 NOTE — TELEPHONE ENCOUNTER
Patients GI provider:  Dr. Taylor Aponte    Number to return call: [phone]    Reason for call: Pt calling stating a prescription was supposed to be called in and has not gotten a call to .      Scheduled procedure/appointment date if applicable: Apt/procedure

## 2023-09-13 ENCOUNTER — TELEPHONE (OUTPATIENT)
Dept: GASTROENTEROLOGY | Facility: CLINIC | Age: 38
End: 2023-09-13

## 2023-09-13 DIAGNOSIS — R10.84 GENERALIZED ABDOMINAL PAIN: Primary | ICD-10-CM

## 2023-09-13 DIAGNOSIS — R63.4 WEIGHT LOSS: ICD-10-CM

## 2023-09-14 ENCOUNTER — TELEPHONE (OUTPATIENT)
Dept: GASTROENTEROLOGY | Facility: CLINIC | Age: 38
End: 2023-09-14

## 2023-09-14 ENCOUNTER — NURSE TRIAGE (OUTPATIENT)
Age: 38
End: 2023-09-14

## 2023-09-14 DIAGNOSIS — R10.84 GENERALIZED ABDOMINAL PAIN: ICD-10-CM

## 2023-09-14 DIAGNOSIS — R63.4 WEIGHT LOSS: Primary | ICD-10-CM

## 2023-09-14 NOTE — TELEPHONE ENCOUNTER
Spoke with Ellen Ritter at [facility]. Will need to appeal the decision. All imaging, labs, procedures, and office notes will need to be sent to them. Case number should be attached. Case # [id number]. Email: [email or web address]    Thank you!

## 2023-09-14 NOTE — TELEPHONE ENCOUNTER
Called and spoke to patient. Let her know that CT scan was re ordered and that it has to be sent to [facility] team to get a PA done. Patient is asking if there is another medication that she can take that has less side effects . .. one she takes now makes her depressed. Please advise. Thank you !

## 2023-09-14 NOTE — TELEPHONE ENCOUNTER
Called central scheduling again and spoke to Jacob Sands. Jacob Sands will link the 2 separate orders ( CT scan with contrast of chest ) & (CT Scan of  abd & pelvis with contrast ) to patients appointment on 9/20/2023 . She will then cancel old order. Will send msg to [facility] team to do PA on both.

## 2023-09-14 NOTE — TELEPHONE ENCOUNTER
Called and spoke to lisy at central scheduling to see if patient can keep same appointment date They want to confirm that the new order is just CT scan of abd and pelvis with contrast , and that the chest is not included. Please advise thank you !

## 2023-09-15 NOTE — TELEPHONE ENCOUNTER
Patient is calling back to ask for an update on wether we have ordered  another medication that she can take? the one she takes now makes her feel  depressed and like a zombie and she says she has to take care of her son so she cant take that.  She is asking we send her something today  so she dose not have to go the weekend with out it   Please advise [phone]

## 2023-09-18 ENCOUNTER — NURSE TRIAGE (OUTPATIENT)
Age: 38
End: 2023-09-18

## 2023-09-18 DIAGNOSIS — R10.9 FUNCTIONAL ABDOMINAL PAIN SYNDROME: Primary | ICD-10-CM

## 2023-09-18 DIAGNOSIS — R10.84 GENERALIZED ABDOMINAL PAIN: Primary | ICD-10-CM

## 2023-09-18 RX ORDER — AMITRIPTYLINE HYDROCHLORIDE 25 MG/1
25 TABLET, FILM COATED ORAL
Qty: 90 TABLET | Refills: 0 | Status: SHIPPED | OUTPATIENT
Start: 2023-09-18

## 2023-09-18 RX ORDER — HYOSCYAMINE SULFATE EXTENDED-RELEASE 0.38 MG/1
0.38 TABLET ORAL EVERY 12 HOURS PRN
Qty: 60 TABLET | Refills: 3 | Status: SHIPPED | OUTPATIENT
Start: 2023-09-18

## 2023-09-18 NOTE — TELEPHONE ENCOUNTER
Called patient and got VM . LMOM that Alexus Rhodes called in [address] [place] [address] .  Left office # as well

## 2023-09-18 NOTE — TELEPHONE ENCOUNTER
I called patient back and reviewed that Levsin was sent in to her pharmacy for abdominal pain and she is insisting that is not used for abdominal pain only cramping. She states she already has dicyclomine for cramping. She wants something else ordered to deal with the abdominal pain. I tried to explain it is used for both but she insists on anther medication and doesn't understand why she can't get correct medication. She cannot take omeprazole. Please review and advise.

## 2023-09-18 NOTE — TELEPHONE ENCOUNTER
Regarding: med issues  ----- [facility] at 9/18/2023 12:38 PM EDT -----  Pt called in with concerns about her meds. Please see pt message from today in cart.  Pt is requesting a call back

## 2023-09-19 ENCOUNTER — TELEPHONE (OUTPATIENT)
Age: 38
End: 2023-09-19

## 2023-09-19 NOTE — TELEPHONE ENCOUNTER
Called patient and got VM. LMOM that CT scan has not been aapproved. .. a appeal went in. ... left central scheduling # so that patient can call to cxl CT scan and R/S

## 2023-09-19 NOTE — TELEPHONE ENCOUNTER
Patients GI provider:  Dr. Jessica Hunt    Number to return call: [phone]    Reason for call: Pt calling wanting to know if CT was approved. Pt would like a call back today. Pt stating she will cancel CT appt scheduled tomorrow 9/20 if hasn't been approved.     Scheduled procedure/appointment date if applicable: N/A

## 2023-09-20 ENCOUNTER — TELEPHONE (OUTPATIENT)
Age: 38
End: 2023-09-20

## 2023-09-22 NOTE — TELEPHONE ENCOUNTER
Called and spoke with pt. She stated  had called and someone relayed Dr. Pao Lamar message to her. She states she is in a lot of pain and no one is willing to her, that she is going to go to emergency room tomorrow. Advised pt go today she stated she cant because of her son so she will go tomorrow.
Jose Sol MD  You 2 days ago       I am not sure if this is an old message. I already contacted patient yesterday afternoon and told her I am not willing to scribe Librax given its side effect profile and abuse potential.  If she would like it prescribed she can see another GI provider  in our network or outside of our network. Noemí Nick also let her know that the CAT scan approval is still pending and does not .  We will let her know if it gets approved or denied again.  If you could please let her know this this would b e greatly appreciated.
Patients GI provider:  Dr. Marie Quick    Number to return call: [phone]    Reason for call: Pt calling to request that amitriptyline be changed to Librax. Pt also asked that another CT scan be ordered; her previously sched scan had to be cancelled as insurance did not pre-approve.  Please refer to pt message from 09/19/2023    Scheduled procedure/appointment date if applicable: N/A
Routing to provider  Please advise, thank you.
DISPLAY PLAN FREE TEXT
25mg/benadryl

## 2023-09-25 DIAGNOSIS — Z30.41 ENCOUNTER FOR SURVEILLANCE OF CONTRACEPTIVE PILLS: ICD-10-CM

## 2023-09-25 DIAGNOSIS — E03.9 ACQUIRED HYPOTHYROIDISM: ICD-10-CM

## 2023-09-25 RX ORDER — LEVOTHYROXINE SODIUM 0.05 MG/1
50 TABLET ORAL
Qty: 30 TABLET | Refills: 0 | Status: SHIPPED | OUTPATIENT
Start: 2023-09-25 | End: 2023-10-25

## 2023-09-26 RX ORDER — NORETHINDRONE ACETATE AND ETHINYL ESTRADIOL 1MG-20(21)
1 KIT ORAL DAILY
Qty: 84 TABLET | Refills: 0 | Status: SHIPPED | OUTPATIENT
Start: 2023-09-26

## 2023-09-27 ENCOUNTER — TELEPHONE (OUTPATIENT)
Dept: GASTROENTEROLOGY | Facility: CLINIC | Age: 38
End: 2023-09-27

## 2023-10-03 DIAGNOSIS — F41.1 GAD (GENERALIZED ANXIETY DISORDER): ICD-10-CM

## 2023-10-05 RX ORDER — CLONAZEPAM 2 MG/1
2 TABLET ORAL 2 TIMES DAILY
Qty: 60 TABLET | Refills: 0 | Status: SHIPPED | OUTPATIENT
Start: 2023-10-10 | End: 2023-11-09

## 2023-10-12 ENCOUNTER — TELEPHONE (OUTPATIENT)
Dept: GASTROENTEROLOGY | Facility: CLINIC | Age: 38
End: 2023-10-12

## 2023-10-12 NOTE — TELEPHONE ENCOUNTER
Called and explained to pt that the Chest portion of the CT was not approved by insurance even a p2p and an appeal. But her appt is still for the same date and time is just the abd pelvis portion of it. Pt voiced understanding.

## 2023-10-12 NOTE — TELEPHONE ENCOUNTER
Called and spoke with Deandre.  She changed the pts ppt to the right order that was approved which was the CT abd pelvis wit contrast.

## 2023-11-06 ENCOUNTER — HOSPITAL ENCOUNTER (OUTPATIENT)
Dept: CT IMAGING | Facility: CLINIC | Age: 38
Discharge: HOME | End: 2023-11-06
Payer: COMMERCIAL

## 2023-11-06 DIAGNOSIS — R63.4 WEIGHT LOSS: ICD-10-CM

## 2023-11-06 DIAGNOSIS — F41.1 GAD (GENERALIZED ANXIETY DISORDER): ICD-10-CM

## 2023-11-06 DIAGNOSIS — R10.84 GENERALIZED ABDOMINAL PAIN: ICD-10-CM

## 2023-11-06 PROCEDURE — G1004 CDSM NDSC: HCPCS

## 2023-11-06 PROCEDURE — 74176 CT ABD & PELVIS W/O CONTRAST: CPT

## 2023-11-06 RX ORDER — CLONAZEPAM 2 MG/1
2 TABLET ORAL 2 TIMES DAILY
Qty: 60 TABLET | Refills: 0 | Status: SHIPPED | OUTPATIENT
Start: 2023-11-06 | End: 2023-12-06

## 2023-11-16 ENCOUNTER — TELEPHONE (OUTPATIENT)
Age: 38
End: 2023-11-16

## 2023-11-16 NOTE — TELEPHONE ENCOUNTER
I didn't order the CATSCAN GI did.  Overall it looks normal. So concenring next step she needs to f/u with GI

## 2023-11-29 ENCOUNTER — TELEPHONE (OUTPATIENT)
Age: 38
End: 2023-11-29

## 2023-11-29 DIAGNOSIS — M54.9 CHRONIC BACK PAIN, UNSPECIFIED BACK LOCATION, UNSPECIFIED BACK PAIN LATERALITY: ICD-10-CM

## 2023-11-29 DIAGNOSIS — M67.919 DISORDER OF ROTATOR CUFF, UNSPECIFIED LATERALITY: Primary | ICD-10-CM

## 2023-11-29 DIAGNOSIS — G89.29 CHRONIC BACK PAIN, UNSPECIFIED BACK LOCATION, UNSPECIFIED BACK PAIN LATERALITY: ICD-10-CM

## 2023-11-29 DIAGNOSIS — M79.646 PAIN OF FINGER, UNSPECIFIED LATERALITY: ICD-10-CM

## 2023-11-29 DIAGNOSIS — M25.552 BILATERAL HIP PAIN: ICD-10-CM

## 2023-11-29 DIAGNOSIS — M25.551 BILATERAL HIP PAIN: ICD-10-CM

## 2023-11-29 NOTE — TELEPHONE ENCOUNTER
Patient needs referral put in and faxed to [facility] fax # [phone]. She said the referrals need to be for her Hip, back, rotator cuff & lt pinky finger.  If you need more information you can reach her at [phone]

## 2023-12-04 ENCOUNTER — TELEPHONE (OUTPATIENT)
Age: 38
End: 2023-12-04

## 2023-12-04 DIAGNOSIS — F41.1 GAD (GENERALIZED ANXIETY DISORDER): ICD-10-CM

## 2023-12-04 NOTE — TELEPHONE ENCOUNTER
Patient left voice mail requesting  Klonopin 2 milligrams. Take one tablet 2 milligram total by mouth, two times a day. Quantity 60.  A

## 2023-12-05 ENCOUNTER — TELEMEDICINE (OUTPATIENT)
Age: 38
End: 2023-12-05
Payer: COMMERCIAL

## 2023-12-05 DIAGNOSIS — B33.8 RSV INFECTION: Primary | ICD-10-CM

## 2023-12-05 PROCEDURE — 99213 OFFICE O/P EST LOW 20 MIN: CPT | Performed by: FAMILY MEDICINE

## 2023-12-05 RX ORDER — BENZONATATE 100 MG/1
100 CAPSULE ORAL 3 TIMES DAILY PRN
Qty: 20 CAPSULE | Refills: 0 | Status: SHIPPED | OUTPATIENT
Start: 2023-12-05

## 2023-12-05 RX ORDER — CLONAZEPAM 2 MG/1
2 TABLET ORAL 2 TIMES DAILY
Qty: 60 TABLET | Refills: 0 | Status: SHIPPED | OUTPATIENT
Start: 2023-12-05 | End: 2024-01-04

## 2023-12-05 RX ORDER — FLUTICASONE PROPIONATE 50 MCG
1 SPRAY, SUSPENSION (ML) NASAL DAILY
Qty: 15.8 ML | Refills: 0 | Status: SHIPPED | OUTPATIENT
Start: 2023-12-05

## 2023-12-05 NOTE — PROGRESS NOTES
Virtual Regular Visit    Verification of patient location:    Patient is located at Home in the following state in which I hold an active license [place]      Assessment/Plan:    Problem List Items Addressed This Visit    None  Visit Diagnoses       RSV infection    -  Primary    Relevant Medications    benzonatate (TESSALON PERLES) 100 mg capsule    dextromethorphan-guaifenesin (MUCINEX DM)  MG per 12 hr tablet    fluticasone (FLONASE) 50 mcg/act nasal spray          Supportive therapies reviewed and prescribed. Reason for visit is   Chief Complaint   Patient presents with    Virtual Regular Visit          Encounter provider Juany Greenwood DO    Provider located at [address] [address] [phone]      Recent Visits  Date Type Provider Dept   11/29/23 Telephone [address] recent visits within past 7 days and meeting all other requirements  Today's Visits  Date Type Provider Dept   12/05/23 Telemedicine Diane Feliciano DO  Primary Care [facility]   Showing today's visits and meeting all other requirements  Future Appointments  No visits were found meeting these conditions. Showing future appointments within next 150 days and meeting all other requirements       The patient was identified by name and date of birth. Albert Du was informed that this is a telemedicine visit and that the visit is being conducted through the [employer]. She agrees to proceed. .  My office door was closed. No one else was in the room. She acknowledged consent and understanding of privacy and security of the video platform. The patient has agreed to participate and understands they can discontinue the visit at any time. Patient is aware this is a billable service. Subjective  Albert Du is a 44 y.o. female       Pt c/o of RSV related URI.  Started with child who was diagnosed with RSV. She has same symptoms. Past Medical History:   Diagnosis Date    Anxiety     Bipolar disorder ([address])     Depression     OCD (obsessive compulsive disorder)        Past Surgical History:   Procedure Laterality Date    BRAIN SURGERY      KNEE SURGERY         Current Outpatient Medications   Medication Sig Dispense Refill    benzonatate (TESSALON PERLES) 100 mg capsule Take 1 capsule (100 mg total) by mouth 3 (three) times a day as needed for cough 20 capsule 0    dextromethorphan-guaifenesin (MUCINEX DM)  MG per 12 hr tablet Take 1 tablet by mouth every 12 (twelve) hours 30 tablet 0    fluticasone (FLONASE) 50 mcg/act nasal spray 1 spray into each nostril daily 15.8 mL 0    aluminum chloride (DRYSOL) 20 % external solution Apply topically daily at bedtime (Patient not taking: Reported on 7/24/2023) 60 mL 2    aluminum-magnesium hydroxide-simethicone (MAALOX MAX) 400-400-40 MG/5ML suspension Take 10 mL by mouth every 6 (six) hours as needed for indigestion or heartburn 355 mL 0    amitriptyline (ELAVIL) 25 mg tablet Take 1 tablet (25 mg total) by mouth daily at bedtime 90 tablet 0    bisacodyl (DULCOLAX) 5 mg EC tablet Take 1 tablet (5 mg total) by mouth daily as needed for constipation 4 tablet 0    celecoxib (CeleBREX) 100 mg capsule Take 1 capsule (100 mg total) by mouth 2 (two) times a day (Patient not taking: Reported on 7/24/2023) 60 capsule 0    clonazePAM (KlonoPIN) 2 mg tablet Take 1 tablet (2 mg total) by mouth 2 (two) times a day 60 tablet 0    diclofenac (VOLTAREN) 75 mg EC tablet TAKE 1 TABLET (75 MG TOTAL) BY MOUTH 2 (TWO) TIMES A DAY WITH MEALS.       dicyclomine (BENTYL) 20 mg tablet Take 1 tablet (20 mg total) by mouth every 6 (six) hours 120 tablet 0    Glycopyrronium Tosylate 2.4 % PADS Apply 1 application topically in the morning (Patient not taking: Reported on 7/24/2023) 30 each 3    hyoscyamine (ANASPAZ) 0.125 mg 1 (ONE) TABLET BY MOUTH THREE TIMES DAILY, AS NEEDED      hyoscyamine (LEVBID) 0.375 mg 12 hr tablet Take 1 tablet (0.375 mg total) by mouth every 12 (twelve) hours as needed for cramping 60 tablet 3    hyoscyamine (LEVSIN/SL) 0.125 mg SL tablet Take 1 tablet (0.125 mg total) by mouth every 4 (four) hours as needed for cramping 60 tablet 3    Hyoscyamine Sulfate SL 0.125 MG SUBL Take 0.125 mg by mouth every 4 (four) hours as needed      ketoconazole (NIZORAL) 2 % shampoo Apply 1 application topically once a week Wash from head to thighs leave on 5 minutes and rinse weekly (Patient not taking: Reported on 7/24/2023) 120 mL 3    levothyroxine (Euthyrox) 50 mcg tablet Take 1 tablet (50 mcg total) by mouth daily in the early morning 30 tablet 0    multivitamin (THERAGRAN) TABS Take 1 tablet by mouth daily (Patient not taking: Reported on 1/17/2023)      naloxone (NARCAN) 4 mg/0.1 mL nasal spray Administer 1 spray into a nostril. If no response after 2-3 minutes, give another dose in the other nostril using a new spray. 1 each 1    norethindrone-ethinyl estradiol ([facility] 1/20) 1-20 MG-MCG per tablet Take 1 tablet by mouth daily 84 tablet 0    omeprazole (PriLOSEC) 40 MG capsule Take 1 capsule (40 mg total) by mouth 2 (two) times a day 60 capsule 0    polyethylene glycol (MiraLax) 17 GM/SCOOP powder Take 238 g by mouth daily 238 g 0    traMADol (ULTRAM) 50 mg tablet TAKE 2 TABLETS (100 MG TOTAL) BY MOUTH 2 (TWO) TIMES A DAY AS NEEDED FOR MODERATE PAIN (PAIN SCORE 4-6). traMADol (ULTRAM) 50 mg tablet Take 100 mg by mouth 2 (two) times a day as needed       No current facility-administered medications for this visit. Allergies   Allergen Reactions    Cortisone Rash and Swelling    Fluoxetine Confusion and Other (See Comments)     Patient states she went "crazy, nuts." Unable to specify more clearly.     Pregabalin Rash and Swelling    Fentanyl Hives     Fever and migraine      Nitrofurantoin Swelling    Acetaminophen Rash    Hydrocodone-Acetaminophen Rash Review of Systems   Constitutional:  Positive for fatigue. Negative for fever. HENT:  Positive for congestion and rhinorrhea. Respiratory:  Positive for cough. Negative for shortness of breath. Video Exam    There were no vitals filed for this visit. Physical Exam  HENT:      Nose: Congestion present. Pulmonary:      Effort: Pulmonary effort is normal.   Neurological:      Mental Status: She is alert and oriented to person, place, and time.           Visit Time  Total Visit Duration: 6 minutes

## 2023-12-15 NOTE — TELEPHONE ENCOUNTER
Pt notified she is not able to make an appointment at this time since her son his sick and she has no power. no

## 2024-01-02 DIAGNOSIS — F41.1 GAD (GENERALIZED ANXIETY DISORDER): ICD-10-CM

## 2024-01-02 RX ORDER — CLONAZEPAM 2 MG/1
2 TABLET ORAL 2 TIMES DAILY
Qty: 60 TABLET | Refills: 0 | Status: SHIPPED | OUTPATIENT
Start: 2024-01-02 | End: 2024-02-01

## 2024-01-08 DIAGNOSIS — Z30.41 ENCOUNTER FOR SURVEILLANCE OF CONTRACEPTIVE PILLS: ICD-10-CM

## 2024-01-08 RX ORDER — NORETHINDRONE ACETATE AND ETHINYL ESTRADIOL 1MG-20(21)
1 KIT ORAL DAILY
Qty: 84 TABLET | Refills: 0 | Status: SHIPPED | OUTPATIENT
Start: 2024-01-08

## 2024-01-25 ENCOUNTER — OFFICE VISIT (OUTPATIENT)
Age: 39
End: 2024-01-25
Payer: COMMERCIAL

## 2024-01-25 ENCOUNTER — APPOINTMENT (OUTPATIENT)
Age: 39
End: 2024-01-25
Payer: COMMERCIAL

## 2024-01-25 VITALS
OXYGEN SATURATION: 98 % | TEMPERATURE: 97.5 F | DIASTOLIC BLOOD PRESSURE: 62 MMHG | SYSTOLIC BLOOD PRESSURE: 114 MMHG | HEART RATE: 100 BPM | BODY MASS INDEX: 18.28 KG/M2 | WEIGHT: 127.4 LBS | RESPIRATION RATE: 18 BRPM

## 2024-01-25 DIAGNOSIS — E03.9 ACQUIRED HYPOTHYROIDISM: ICD-10-CM

## 2024-01-25 DIAGNOSIS — Z00.00 ANNUAL PHYSICAL EXAM: ICD-10-CM

## 2024-01-25 DIAGNOSIS — J01.01 ACUTE RECURRENT MAXILLARY SINUSITIS: ICD-10-CM

## 2024-01-25 DIAGNOSIS — Z00.00 ANNUAL PHYSICAL EXAM: Primary | ICD-10-CM

## 2024-01-25 DIAGNOSIS — M54.9 CHRONIC NECK AND BACK PAIN: ICD-10-CM

## 2024-01-25 DIAGNOSIS — Z79.891 CHRONIC PRESCRIPTION OPIATE USE: ICD-10-CM

## 2024-01-25 DIAGNOSIS — F41.1 GAD (GENERALIZED ANXIETY DISORDER): ICD-10-CM

## 2024-01-25 DIAGNOSIS — M54.2 CHRONIC NECK AND BACK PAIN: ICD-10-CM

## 2024-01-25 DIAGNOSIS — G89.29 CHRONIC NECK AND BACK PAIN: ICD-10-CM

## 2024-01-25 PROBLEM — Z86.79: Status: RESOLVED | Noted: 2021-03-25 | Resolved: 2024-01-25

## 2024-01-25 PROBLEM — S62.603D: Status: RESOLVED | Noted: 2022-08-09 | Resolved: 2024-01-25

## 2024-01-25 PROBLEM — R63.4 RECENT UNEXPLAINED WEIGHT LOSS: Status: RESOLVED | Noted: 2022-04-04 | Resolved: 2024-01-25

## 2024-01-25 LAB
ALBUMIN SERPL BCP-MCNC: 4.1 G/DL (ref 3.5–5)
ALP SERPL-CCNC: 43 U/L (ref 34–104)
ALT SERPL W P-5'-P-CCNC: 6 U/L (ref 7–52)
ANION GAP SERPL CALCULATED.3IONS-SCNC: 7 MMOL/L
AST SERPL W P-5'-P-CCNC: 13 U/L (ref 13–39)
BILIRUB SERPL-MCNC: 0.38 MG/DL (ref 0.2–1)
BUN SERPL-MCNC: 8 MG/DL (ref 5–25)
CALCIUM SERPL-MCNC: 8.6 MG/DL (ref 8.4–10.2)
CHLORIDE SERPL-SCNC: 105 MMOL/L (ref 96–108)
CO2 SERPL-SCNC: 26 MMOL/L (ref 21–32)
CREAT SERPL-MCNC: 0.67 MG/DL (ref 0.6–1.3)
ERYTHROCYTE [DISTWIDTH] IN BLOOD BY AUTOMATED COUNT: 13.4 % (ref 11.6–15.1)
GFR SERPL CREATININE-BSD FRML MDRD: 111 ML/MIN/1.73SQ M
GLUCOSE P FAST SERPL-MCNC: 81 MG/DL (ref 65–99)
HCT VFR BLD AUTO: 41.6 % (ref 34.8–46.1)
HGB BLD-MCNC: 13.8 G/DL (ref 11.5–15.4)
MCH RBC QN AUTO: 31.4 PG (ref 26.8–34.3)
MCHC RBC AUTO-ENTMCNC: 33.2 G/DL (ref 31.4–37.4)
MCV RBC AUTO: 95 FL (ref 82–98)
PLATELET # BLD AUTO: 283 THOUSANDS/UL (ref 149–390)
PMV BLD AUTO: 11.3 FL (ref 8.9–12.7)
POTASSIUM SERPL-SCNC: 3.8 MMOL/L (ref 3.5–5.3)
PROT SERPL-MCNC: 6.8 G/DL (ref 6.4–8.4)
RBC # BLD AUTO: 4.4 MILLION/UL (ref 3.81–5.12)
SODIUM SERPL-SCNC: 138 MMOL/L (ref 135–147)
T4 FREE SERPL-MCNC: 0.75 NG/DL (ref 0.61–1.12)
TSH SERPL DL<=0.05 MIU/L-ACNC: 6.07 UIU/ML (ref 0.45–4.5)
WBC # BLD AUTO: 15.3 THOUSAND/UL (ref 4.31–10.16)

## 2024-01-25 PROCEDURE — 36415 COLL VENOUS BLD VENIPUNCTURE: CPT

## 2024-01-25 PROCEDURE — 84443 ASSAY THYROID STIM HORMONE: CPT

## 2024-01-25 PROCEDURE — 85027 COMPLETE CBC AUTOMATED: CPT

## 2024-01-25 PROCEDURE — 99214 OFFICE O/P EST MOD 30 MIN: CPT | Performed by: FAMILY MEDICINE

## 2024-01-25 PROCEDURE — 84439 ASSAY OF FREE THYROXINE: CPT

## 2024-01-25 PROCEDURE — 99395 PREV VISIT EST AGE 18-39: CPT | Performed by: FAMILY MEDICINE

## 2024-01-25 PROCEDURE — 80053 COMPREHEN METABOLIC PANEL: CPT

## 2024-01-25 RX ORDER — ACETAMINOPHEN AND CODEINE PHOSPHATE 300; 30 MG/1; MG/1
1 TABLET ORAL 3 TIMES DAILY PRN
Qty: 30 TABLET | Refills: 0 | Status: SHIPPED | OUTPATIENT
Start: 2024-01-25 | End: 2024-02-07 | Stop reason: SDUPTHER

## 2024-01-25 RX ORDER — LIDOCAINE 50 MG/G
3 PATCH TOPICAL DAILY PRN
COMMUNITY
Start: 2024-01-03

## 2024-01-25 RX ORDER — AZITHROMYCIN 250 MG/1
TABLET, FILM COATED ORAL DAILY
Qty: 6 TABLET | Refills: 0 | Status: SHIPPED | OUTPATIENT
Start: 2024-01-25 | End: 2024-01-30

## 2024-01-25 RX ORDER — NALOXONE HYDROCHLORIDE 4 MG/.1ML
SPRAY NASAL
Qty: 1 EACH | Refills: 1 | Status: SHIPPED | OUTPATIENT
Start: 2024-01-25 | End: 2025-01-24

## 2024-01-25 NOTE — PROGRESS NOTES
ADULT ANNUAL PHYSICAL  [facility] PRIMARY CARE [place]    NAME: Yoly Castro  AGE: 38 y.o. SEX: female  : 1985     DATE: 2024     Assessment and Plan:     Problem List Items Addressed This Visit       SHASHI (generalized anxiety disorder)- stable with clonazepam 2mg bid.      Other Visit Diagnoses       Annual physical exam    -  Primary    Relevant Orders    CBC and Platelet (Completed)    Comprehensive metabolic panel (Completed)    Acquired hypothyroidism    - tsh out of range on lower dose. Pt to take 75mcg qd. And have repeat tsh in 6 weeks.     Relevant Orders    TSH, 3rd generation with Free T4 reflex (Completed)    Chronic prescription opiate use        Relevant Medications    naloxone (NARCAN) 4 mg/0.1 mL nasal spray    Chronic neck and back pain        Relevant Medications    acetaminophen-codeine (TYLENOL/CODEINE #3) 300-30 MG per tablet    Acute recurrent maxillary sinusitis   - will treat with azithromycin                Immunizations and preventive care screenings were discussed with patient today. Appropriate education was printed on patient's after visit summary.    Counseling:  Injury prevention: discussed safety/seat belts, safety helmets, smoke detectors, carbon dioxide detectors, and smoking near bedding or upholstery.         No follow-ups on file.     Chief Complaint:     Chief Complaint   Patient presents with    phyisical     Pt is here for her yearly physical      History of Present Illness:     Adult Annual Physical   Patient here for a comprehensive physical exam. The patient reports problems - chronic pain.  . Establishes with new pain management doctor soon.    Diet and Physical Activity  Diet/Nutrition: well balanced diet.   Exercise: no formal exercise.      Depression Screening  PHQ-2/9 Depression Screening    Little interest or pleasure in doing things: 3 - nearly every day  Feeling down, depressed, or hopeless: 1 - several  days  Trouble falling or staying asleep, or sleeping too much: 3 - nearly every day  Feeling tired or having little energy: 1 - several days  Poor appetite or overeatin - several days  Feeling bad about yourself - or that you are a failure or have let yourself or your family down: 0 - not at all  Trouble concentrating on things, such as reading the newspaper or watching television: 1 - several days  Moving or speaking so slowly that other people could have noticed. Or the opposite - being so fidgety or restless that you have been moving around a lot more than usual: 0 - not at all  Thoughts that you would be better off dead, or of hurting yourself in some way: 0 - not at all  PHQ-2 Score: 4  PHQ-2 Interpretation: POSITIVE depression screen  PHQ-9 Score: 10  PHQ-9 Interpretation: Moderate depression       General Health  Sleep: sleeps poorly.   Hearing: decreased - bilateral.  Vision: no vision problems.   Dental: brushes teeth once daily.       /GYN Health  Follows with gynecology? yes   Last menstrual period: last month  Contraceptive method: oral contraceptives.           Review of Systems:     Review of Systems   Constitutional:  Negative for fever.   HENT:  Positive for congestion, sinus pressure and sinus pain.    Musculoskeletal:  Positive for arthralgias, back pain and gait problem.   Neurological:  Positive for headaches.      Past Medical History:     Past Medical History:   Diagnosis Date    Anxiety     Bipolar disorder (HCC)     Closed nondisplaced fracture of phalanx of left middle finger with routine healing 2022    Depression     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 2021    OCD (obsessive compulsive disorder)       Past Surgical History:     Past Surgical History:   Procedure Laterality Date    BRAIN SURGERY      KNEE SURGERY        Social History:     Social History     Socioeconomic History    Marital status: Single     Spouse name: None    Number of  children: None    Years of education: None    Highest education level: None   Occupational History    None   Tobacco Use    Smoking status: Every Day     Current packs/day: 2.00     Types: Cigarettes    Smokeless tobacco: Never   Vaping Use    Vaping status: Never Used   Substance and Sexual Activity    Alcohol use: Not Currently    Drug use: Not Currently     Types: Marijuana    Sexual activity: Yes   Other Topics Concern    None   Social History Narrative    None     Social Determinants of Health     Financial Resource Strain: Not on file   Food Insecurity: Not on file   Transportation Needs: Not on file   Physical Activity: Sufficiently Active (8/2/2022)    Exercise Vital Sign     Days of Exercise per Week: 3 days     Minutes of Exercise per Session: 90 min   Stress: No Stress Concern Present (8/9/2022)    [employer] [place] [employer] - Occupational Stress Questionnaire     Feeling of Stress : Not at all   Social Connections: Not on file   Intimate Partner Violence: Not on file   Housing Stability: Not on file      Family History:     History reviewed. No pertinent family history.   Current Medications:     Current Outpatient Medications   Medication Sig Dispense Refill    acetaminophen-codeine (TYLENOL/CODEINE #3) 300-30 MG per tablet Take 1 tablet by mouth 3 (three) times a day as needed for moderate pain 30 tablet 0    dextromethorphan-guaifenesin (MUCINEX DM)  MG per 12 hr tablet Take 1 tablet by mouth every 12 (twelve) hours 30 tablet 0    fluticasone (FLONASE) 50 mcg/act nasal spray 1 spray into each nostril daily 15.8 mL 0    lidocaine (LIDODERM) 5 % Place 3 patches on the skin daily as needed      naloxone (NARCAN) 4 mg/0.1 mL nasal spray Administer 1 spray into a nostril. If no response after 2-3 minutes, give another dose in the other nostril using a new spray. 1 each 1    norethindrone-ethinyl estradiol ([facility] 1/20) 1-20 MG-MCG per tablet Take 1 tablet by mouth daily 84 tablet 0     aluminum chloride (DRYSOL) 20 % external solution Apply topically daily at bedtime (Patient not taking: Reported on 7/24/2023) 60 mL 2    aluminum-magnesium hydroxide-simethicone (MAALOX MAX) 400-400-40 MG/5ML suspension Take 10 mL by mouth every 6 (six) hours as needed for indigestion or heartburn (Patient not taking: Reported on 1/25/2024) 355 mL 0    amitriptyline (ELAVIL) 25 mg tablet Take 1 tablet (25 mg total) by mouth daily at bedtime (Patient not taking: Reported on 1/25/2024) 90 tablet 0    benzonatate (TESSALON PERLES) 100 mg capsule Take 1 capsule (100 mg total) by mouth 3 (three) times a day as needed for cough (Patient not taking: Reported on 1/25/2024) 20 capsule 0    bisacodyl (DULCOLAX) 5 mg EC tablet Take 1 tablet (5 mg total) by mouth daily as needed for constipation (Patient not taking: Reported on 1/25/2024) 4 tablet 0    celecoxib (CeleBREX) 100 mg capsule Take 1 capsule (100 mg total) by mouth 2 (two) times a day (Patient not taking: Reported on 7/24/2023) 60 capsule 0    clonazePAM (KlonoPIN) 2 mg tablet Take 1 tablet (2 mg total) by mouth 2 (two) times a day 60 tablet 0    hyoscyamine (LEVSIN/SL) 0.125 mg SL tablet Take 1 tablet (0.125 mg total) by mouth every 4 (four) hours as needed for cramping (Patient not taking: Reported on 1/25/2024) 60 tablet 3    Hyoscyamine Sulfate SL 0.125 MG SUBL Take 0.125 mg by mouth every 4 (four) hours as needed (Patient not taking: Reported on 1/25/2024)      ketoconazole (NIZORAL) 2 % shampoo Apply 1 application topically once a week Wash from head to thighs leave on 5 minutes and rinse weekly (Patient not taking: Reported on 7/24/2023) 120 mL 3    levothyroxine (Euthyrox) 50 mcg tablet Take 1 tablet (50 mcg total) by mouth daily in the early morning 30 tablet 0     No current facility-administered medications for this visit.      Allergies:     Allergies   Allergen Reactions    Cortisone Rash and Swelling    Fluoxetine Confusion and Other (See Comments)  "    Patient states she went \"crazy, nuts.\" Unable to specify more clearly.    Pregabalin Rash and Swelling    Fentanyl Hives     Fever and migraine      Nitrofurantoin Swelling    Acetaminophen Rash    Hydrocodone-Acetaminophen Rash      Physical Exam:     /62 (BP Location: Left arm, Patient Position: Sitting, Cuff Size: Standard)   Pulse 100   Temp 97.5 °F (36.4 °C) (Temporal)   Resp 18   Wt 57.8 kg (127 lb 6.4 oz)   SpO2 98%   BMI 18.28 kg/m²     Physical Exam  HENT:      Head: Normocephalic and atraumatic.      Right Ear: External ear normal.   Eyes:      Conjunctiva/sclera: Conjunctivae normal.      Pupils: Pupils are equal, round, and reactive to light.   Cardiovascular:      Rate and Rhythm: Normal rate and regular rhythm.   Pulmonary:      Effort: Pulmonary effort is normal.      Breath sounds: Normal breath sounds.   Abdominal:      General: Bowel sounds are normal.      Palpations: Abdomen is soft.   Neurological:      Mental Status: She is alert and oriented to person, place, and time.      Gait: Gait normal.   Psychiatric:         Mood and Affect: Mood normal.         Behavior: Behavior normal.          Diane Lorenzo,    [facility] PRIMARY CARE [place]    "

## 2024-01-29 ENCOUNTER — TELEPHONE (OUTPATIENT)
Age: 39
End: 2024-01-29

## 2024-01-29 DIAGNOSIS — F41.1 GAD (GENERALIZED ANXIETY DISORDER): ICD-10-CM

## 2024-01-29 NOTE — TELEPHONE ENCOUNTER
Spoke to patient let her know to take only 50mcg as per Dr Lorenzo because she only started taking the levo that days she saw Dr Lorenzo and Dr Lorenzo said take it everyday and redo bloodwork in 6 weeks and will evaluate from there

## 2024-01-29 NOTE — TELEPHONE ENCOUNTER
----- Message from Diane Lorenzo DO sent at 1/29/2024  2:49 PM EST -----  Overall blood work is significant for elevated thyroid level.  She should be taking a thyroid hormone called levothyroxine 50 mcg daily. If  She has been taking this then I recommend increasing it to 75.  She has not been taking it I recommend restarting at the 50 mcg dose.

## 2024-01-30 RX ORDER — CLONAZEPAM 2 MG/1
2 TABLET ORAL 2 TIMES DAILY
Qty: 60 TABLET | Refills: 0 | Status: SHIPPED | OUTPATIENT
Start: 2024-01-30 | End: 2024-02-29

## 2024-02-05 ENCOUNTER — TELEPHONE (OUTPATIENT)
Age: 39
End: 2024-02-05

## 2024-02-05 NOTE — TELEPHONE ENCOUNTER
Needs refill on:    evothyroxine (Euthyrox) 50 mcg tablet ( . Dr Lorenzo wanted to increase her dosage to 75mcg's due to her abnormal laabs.    Also needs acetaminophen-codeine (TYLENOL/CODEINE #3) 300-30 MG per tablet , just needs enough called in until her appt on 2/8. With pain rukhsana.    [facility] [place]

## 2024-02-07 DIAGNOSIS — M54.2 CHRONIC NECK AND BACK PAIN: ICD-10-CM

## 2024-02-07 DIAGNOSIS — G89.29 CHRONIC NECK AND BACK PAIN: ICD-10-CM

## 2024-02-07 DIAGNOSIS — M54.9 CHRONIC NECK AND BACK PAIN: ICD-10-CM

## 2024-02-07 DIAGNOSIS — K08.89 PAIN, DENTAL: Primary | ICD-10-CM

## 2024-02-08 RX ORDER — ACETAMINOPHEN AND CODEINE PHOSPHATE 300; 30 MG/1; MG/1
1 TABLET ORAL 2 TIMES DAILY PRN
Qty: 20 TABLET | Refills: 0 | Status: SHIPPED | OUTPATIENT
Start: 2024-02-08

## 2024-02-08 RX ORDER — AMOXICILLIN 500 MG/1
500 CAPSULE ORAL EVERY 12 HOURS SCHEDULED
Qty: 20 CAPSULE | Refills: 0 | Status: SHIPPED | OUTPATIENT
Start: 2024-02-08 | End: 2024-02-18

## 2024-02-21 ENCOUNTER — APPOINTMENT (OUTPATIENT)
Age: 39
End: 2024-02-21
Payer: COMMERCIAL

## 2024-02-21 ENCOUNTER — OFFICE VISIT (OUTPATIENT)
Age: 39
End: 2024-02-21
Payer: COMMERCIAL

## 2024-02-21 VITALS
DIASTOLIC BLOOD PRESSURE: 58 MMHG | TEMPERATURE: 97.6 F | HEART RATE: 89 BPM | WEIGHT: 126 LBS | BODY MASS INDEX: 18.08 KG/M2 | SYSTOLIC BLOOD PRESSURE: 100 MMHG | RESPIRATION RATE: 18 BRPM | OXYGEN SATURATION: 98 %

## 2024-02-21 DIAGNOSIS — E03.9 ACQUIRED HYPOTHYROIDISM: ICD-10-CM

## 2024-02-21 DIAGNOSIS — M54.2 CHRONIC NECK AND BACK PAIN: ICD-10-CM

## 2024-02-21 DIAGNOSIS — F41.1 GAD (GENERALIZED ANXIETY DISORDER): ICD-10-CM

## 2024-02-21 DIAGNOSIS — E03.9 ACQUIRED HYPOTHYROIDISM: Primary | ICD-10-CM

## 2024-02-21 DIAGNOSIS — G89.29 CHRONIC NECK AND BACK PAIN: ICD-10-CM

## 2024-02-21 DIAGNOSIS — M54.9 CHRONIC NECK AND BACK PAIN: ICD-10-CM

## 2024-02-21 LAB — TSH SERPL DL<=0.05 MIU/L-ACNC: 1.94 UIU/ML (ref 0.45–4.5)

## 2024-02-21 PROCEDURE — 84443 ASSAY THYROID STIM HORMONE: CPT

## 2024-02-21 PROCEDURE — 99214 OFFICE O/P EST MOD 30 MIN: CPT | Performed by: FAMILY MEDICINE

## 2024-02-21 PROCEDURE — 36415 COLL VENOUS BLD VENIPUNCTURE: CPT

## 2024-02-21 RX ORDER — ACETAMINOPHEN AND CODEINE PHOSPHATE 300; 30 MG/1; MG/1
1 TABLET ORAL 2 TIMES DAILY PRN
Qty: 15 TABLET | Refills: 0 | Status: SHIPPED | OUTPATIENT
Start: 2024-02-21

## 2024-02-21 RX ORDER — CLONAZEPAM 2 MG/1
2 TABLET ORAL 2 TIMES DAILY
Qty: 60 TABLET | Refills: 0 | Status: CANCELLED | OUTPATIENT
Start: 2024-02-21 | End: 2024-03-22

## 2024-02-21 RX ORDER — LEVOTHYROXINE SODIUM 0.05 MG/1
50 TABLET ORAL
Qty: 30 TABLET | Refills: 0 | Status: SHIPPED | OUTPATIENT
Start: 2024-02-21 | End: 2024-03-22

## 2024-02-21 NOTE — PROGRESS NOTES
Name: Yoly Castro      : 1985      MRN: [id number]  Encounter Provider: Diane Lorenzo DO  Encounter Date: 2024   Encounter department: [facility] PRIMARY CARE [place]    Assessment & Plan     1. Acquired hypothyroidism-currently on levothyroxine 50 mcg.  Will continue.  Repeat TSH ordered.  If still elevated will then increase to 75 mcg  -     levothyroxine (Euthyrox) 50 mcg tablet; Take 1 tablet (50 mcg total) by mouth daily in the early morning  -     TSH, 3rd generation with Free T4 reflex; Future; Expected date: 2024    2. SHASHI (generalized anxiety disorder)-stable with Klonopin 2 mg twice daily use           Subjective      Presents for follow-up concerning her thyroid disease.  Reports taking 50 mcg daily more consistently.  Has not noticed any change in fatigue.  Discussed anxiety.          Current Outpatient Medications on File Prior to Visit   Medication Sig    clonazePAM (KlonoPIN) 2 mg tablet Take 1 tablet (2 mg total) by mouth 2 (two) times a day    dextromethorphan-guaifenesin (MUCINEX DM)  MG per 12 hr tablet Take 1 tablet by mouth every 12 (twelve) hours    fluticasone (FLONASE) 50 mcg/act nasal spray 1 spray into each nostril daily    lidocaine (LIDODERM) 5 % Place 3 patches on the skin daily as needed    norethindrone-ethinyl estradiol ([facility] ) 1-20 MG-MCG per tablet Take 1 tablet by mouth daily    [DISCONTINUED] levothyroxine (Euthyrox) 50 mcg tablet Take 1 tablet (50 mcg total) by mouth daily in the early morning    acetaminophen-codeine (TYLENOL/CODEINE #3) 300-30 MG per tablet Take 1 tablet by mouth 2 (two) times a day as needed for moderate pain (Patient not taking: Reported on 2024)    hyoscyamine (LEVSIN/SL) 0.125 mg SL tablet Take 1 tablet (0.125 mg total) by mouth every 4 (four) hours as needed for cramping (Patient not taking: Reported on 2024)    Hyoscyamine Sulfate SL 0.125 MG SUBL Take 0.125 mg by mouth every 4 (four) hours as  needed (Patient not taking: Reported on 1/25/2024)    ketoconazole (NIZORAL) 2 % shampoo Apply 1 application topically once a week Wash from head to thighs leave on 5 minutes and rinse weekly (Patient not taking: Reported on 7/24/2023)    naloxone (NARCAN) 4 mg/0.1 mL nasal spray Administer 1 spray into a nostril. If no response after 2-3 minutes, give another dose in the other nostril using a new spray.    [DISCONTINUED] aluminum chloride (DRYSOL) 20 % external solution Apply topically daily at bedtime (Patient not taking: Reported on 7/24/2023)    [DISCONTINUED] aluminum-magnesium hydroxide-simethicone (MAALOX MAX) 400-400-40 MG/5ML suspension Take 10 mL by mouth every 6 (six) hours as needed for indigestion or heartburn (Patient not taking: Reported on 1/25/2024)    [DISCONTINUED] amitriptyline (ELAVIL) 25 mg tablet Take 1 tablet (25 mg total) by mouth daily at bedtime (Patient not taking: Reported on 1/25/2024)    [DISCONTINUED] benzonatate (TESSALON PERLES) 100 mg capsule Take 1 capsule (100 mg total) by mouth 3 (three) times a day as needed for cough (Patient not taking: Reported on 1/25/2024)    [DISCONTINUED] bisacodyl (DULCOLAX) 5 mg EC tablet Take 1 tablet (5 mg total) by mouth daily as needed for constipation (Patient not taking: Reported on 1/25/2024)    [DISCONTINUED] celecoxib (CeleBREX) 100 mg capsule Take 1 capsule (100 mg total) by mouth 2 (two) times a day (Patient not taking: Reported on 7/24/2023)       Objective     /58 (BP Location: Left arm, Patient Position: Sitting, Cuff Size: Large)   Pulse 89   Temp 97.6 °F (36.4 °C) (Temporal)   Resp 18   Wt 57.2 kg (126 lb)   SpO2 98%   BMI 18.08 kg/m²     Physical Exam  HENT:      Head: Normocephalic.   Eyes:      Conjunctiva/sclera: Conjunctivae normal.   Cardiovascular:      Rate and Rhythm: Normal rate.   Pulmonary:      Effort: Pulmonary effort is normal.   Neurological:      Mental Status: She is alert and oriented to person, place, and  time.   Psychiatric:         Mood and Affect: Mood normal.         Behavior: Behavior normal.       Diane Lorenzo,

## 2024-02-26 DIAGNOSIS — F41.1 GAD (GENERALIZED ANXIETY DISORDER): ICD-10-CM

## 2024-02-26 RX ORDER — CLONAZEPAM 2 MG/1
2 TABLET ORAL 2 TIMES DAILY
Qty: 60 TABLET | Refills: 0 | Status: SHIPPED | OUTPATIENT
Start: 2024-02-26 | End: 2024-03-27

## 2024-02-27 ENCOUNTER — TELEPHONE (OUTPATIENT)
Age: 39
End: 2024-02-27

## 2024-02-27 NOTE — TELEPHONE ENCOUNTER
Pt saw Dr Lorenzo last wk  Labs were good  Then she got sick    Her balance is off; might have vertigo  No energy  Could it be from the thyroid medication- levothyroxine?  She losing weight also  She took the clonazePAM today

## 2024-02-28 NOTE — TELEPHONE ENCOUNTER
Likely is not from her thyroid medication.  It could be from an acute viral illness that sounds like she may have however if she still episodes of dizziness then she needs to be seen in the ER

## 2024-03-04 ENCOUNTER — PATIENT MESSAGE (OUTPATIENT)
Age: 39
End: 2024-03-04

## 2024-03-04 DIAGNOSIS — K08.89 PAIN, DENTAL: Primary | ICD-10-CM

## 2024-03-05 RX ORDER — AMOXICILLIN 500 MG/1
500 CAPSULE ORAL EVERY 12 HOURS SCHEDULED
Qty: 14 CAPSULE | Refills: 0 | Status: SHIPPED | OUTPATIENT
Start: 2024-03-05 | End: 2024-03-12

## 2024-03-20 ENCOUNTER — OFFICE VISIT (OUTPATIENT)
Age: 39
End: 2024-03-20
Payer: COMMERCIAL

## 2024-03-20 ENCOUNTER — APPOINTMENT (OUTPATIENT)
Age: 39
End: 2024-03-20
Payer: COMMERCIAL

## 2024-03-20 VITALS
SYSTOLIC BLOOD PRESSURE: 98 MMHG | TEMPERATURE: 97.2 F | DIASTOLIC BLOOD PRESSURE: 70 MMHG | OXYGEN SATURATION: 98 % | HEART RATE: 80 BPM | BODY MASS INDEX: 17.92 KG/M2 | RESPIRATION RATE: 18 BRPM | WEIGHT: 125.2 LBS | HEIGHT: 70 IN

## 2024-03-20 DIAGNOSIS — R41.89 BRAIN FOG: ICD-10-CM

## 2024-03-20 DIAGNOSIS — Z72.0 NICOTINE ABUSE: ICD-10-CM

## 2024-03-20 DIAGNOSIS — F41.1 GAD (GENERALIZED ANXIETY DISORDER): Primary | ICD-10-CM

## 2024-03-20 LAB
ANION GAP SERPL CALCULATED.3IONS-SCNC: 11 MMOL/L (ref 4–13)
BASOPHILS # BLD AUTO: 0.06 THOUSANDS/ÂΜL (ref 0–0.1)
BASOPHILS NFR BLD AUTO: 1 % (ref 0–1)
BUN SERPL-MCNC: 8 MG/DL (ref 5–25)
CALCIUM SERPL-MCNC: 9 MG/DL (ref 8.4–10.2)
CHLORIDE SERPL-SCNC: 102 MMOL/L (ref 96–108)
CO2 SERPL-SCNC: 26 MMOL/L (ref 21–32)
CREAT SERPL-MCNC: 0.66 MG/DL (ref 0.6–1.3)
EOSINOPHIL # BLD AUTO: 0.04 THOUSAND/ÂΜL (ref 0–0.61)
EOSINOPHIL NFR BLD AUTO: 0 % (ref 0–6)
ERYTHROCYTE [DISTWIDTH] IN BLOOD BY AUTOMATED COUNT: 14.4 % (ref 11.6–15.1)
GFR SERPL CREATININE-BSD FRML MDRD: 111 ML/MIN/1.73SQ M
GLUCOSE P FAST SERPL-MCNC: 84 MG/DL (ref 65–99)
HCT VFR BLD AUTO: 47.3 % (ref 34.8–46.1)
HGB BLD-MCNC: 15.2 G/DL (ref 11.5–15.4)
IMM GRANULOCYTES # BLD AUTO: 0.04 THOUSAND/UL (ref 0–0.2)
IMM GRANULOCYTES NFR BLD AUTO: 0 % (ref 0–2)
LYMPHOCYTES # BLD AUTO: 1.61 THOUSANDS/ÂΜL (ref 0.6–4.47)
LYMPHOCYTES NFR BLD AUTO: 14 % (ref 14–44)
MCH RBC QN AUTO: 30.8 PG (ref 26.8–34.3)
MCHC RBC AUTO-ENTMCNC: 32.1 G/DL (ref 31.4–37.4)
MCV RBC AUTO: 96 FL (ref 82–98)
MONOCYTES # BLD AUTO: 0.53 THOUSAND/ÂΜL (ref 0.17–1.22)
MONOCYTES NFR BLD AUTO: 5 % (ref 4–12)
NEUTROPHILS # BLD AUTO: 9 THOUSANDS/ÂΜL (ref 1.85–7.62)
NEUTS SEG NFR BLD AUTO: 80 % (ref 43–75)
NRBC BLD AUTO-RTO: 0 /100 WBCS
PLATELET # BLD AUTO: 346 THOUSANDS/UL (ref 149–390)
PMV BLD AUTO: 10.7 FL (ref 8.9–12.7)
POTASSIUM SERPL-SCNC: 4.2 MMOL/L (ref 3.5–5.3)
RBC # BLD AUTO: 4.94 MILLION/UL (ref 3.81–5.12)
SODIUM SERPL-SCNC: 139 MMOL/L (ref 135–147)
TSH SERPL DL<=0.05 MIU/L-ACNC: 1.83 UIU/ML (ref 0.45–4.5)
VIT B12 SERPL-MCNC: 354 PG/ML (ref 180–914)
WBC # BLD AUTO: 11.28 THOUSAND/UL (ref 4.31–10.16)

## 2024-03-20 PROCEDURE — 99406 BEHAV CHNG SMOKING 3-10 MIN: CPT | Performed by: FAMILY MEDICINE

## 2024-03-20 PROCEDURE — 80048 BASIC METABOLIC PNL TOTAL CA: CPT

## 2024-03-20 PROCEDURE — 36415 COLL VENOUS BLD VENIPUNCTURE: CPT

## 2024-03-20 PROCEDURE — 84443 ASSAY THYROID STIM HORMONE: CPT

## 2024-03-20 PROCEDURE — 82607 VITAMIN B-12: CPT

## 2024-03-20 PROCEDURE — 86618 LYME DISEASE ANTIBODY: CPT

## 2024-03-20 PROCEDURE — 99214 OFFICE O/P EST MOD 30 MIN: CPT | Performed by: FAMILY MEDICINE

## 2024-03-20 PROCEDURE — 85025 COMPLETE CBC W/AUTO DIFF WBC: CPT

## 2024-03-20 RX ORDER — CLONAZEPAM 2 MG/1
2 TABLET ORAL 2 TIMES DAILY
Qty: 60 TABLET | Refills: 0 | Status: CANCELLED | OUTPATIENT
Start: 2024-03-20 | End: 2024-04-19

## 2024-03-20 NOTE — PROGRESS NOTES
Name: Yoly Castro      : 1985      MRN: [id number]  Encounter Provider: Diane Lorenzo DO  Encounter Date: 3/20/2024   Encounter department: [facility] PRIMARY CARE [place]    Assessment & Plan     1. SHASHI (generalized anxiety disorder)-not well-controlled on Klonopin 2 mg twice daily.  Patient not amendable to any additional therapies.  Strongly encouraged her to establish with psychiatry.    2. Nicotine abuse-note counseling below    3. Brain fog-onset 6 months.  Will workup for metabolic etiology.  Discussed the potential t source could be benzodiazepine use.  -     CBC and differential; Future; Expected date: 2024  -     TSH, 3rd generation with Free T4 reflex; Future; Expected date: 2024  -     Basic metabolic panel; Future; Expected date: 2024  -     Vitamin B12; Future; Expected date: 2024  -     Lyme Total AB W Reflex to IGM/IGG; Future; Expected date: 2024        Tobacco Cessation Counseling: Tobacco cessation counseling was provided. The patient is sincerely urged to quit consumption of tobacco. She is ready to quit tobacco. Medication options discussed. 6 minutes of smoking cessation counseling provided        Subjective      Patient presents for follow-up.  Concerns for memory.  Worsening anxiety.      Review of Systems   Neurological:  Positive for headaches.   Psychiatric/Behavioral:  Positive for decreased concentration and sleep disturbance (Excessive sleep). The patient is nervous/anxious.        Current Outpatient Medications on File Prior to Visit   Medication Sig    acetaminophen-codeine (TYLENOL/CODEINE #3) 300-30 MG per tablet Take 1 tablet by mouth 2 (two) times a day as needed for moderate pain    dextromethorphan-guaifenesin (MUCINEX DM)  MG per 12 hr tablet Take 1 tablet by mouth every 12 (twelve) hours    fluticasone (FLONASE) 50 mcg/act nasal spray 1 spray into each nostril daily    levothyroxine (Euthyrox) 50 mcg tablet Take 1  "tablet (50 mcg total) by mouth daily in the early morning    lidocaine (LIDODERM) 5 % Place 3 patches on the skin daily as needed    norethindrone-ethinyl estradiol (Jamilah  1/20) 1-20 MG-MCG per tablet Take 1 tablet by mouth daily    hyoscyamine (LEVSIN/SL) 0.125 mg SL tablet Take 1 tablet (0.125 mg total) by mouth every 4 (four) hours as needed for cramping (Patient not taking: Reported on 1/25/2024)    ketoconazole (NIZORAL) 2 % shampoo Apply 1 application topically once a week Wash from head to thighs leave on 5 minutes and rinse weekly (Patient not taking: Reported on 7/24/2023)    naloxone (NARCAN) 4 mg/0.1 mL nasal spray Administer 1 spray into a nostril. If no response after 2-3 minutes, give another dose in the other nostril using a new spray.       Objective     BP 98/70 (BP Location: Left arm, Patient Position: Sitting, Cuff Size: Standard)   Pulse 80   Temp (!) 97.2 °F (36.2 °C) (Tympanic)   Resp 18   Ht 5' 10\" (1.778 m)   Wt 56.8 kg (125 lb 3.2 oz)   SpO2 98%   BMI 17.96 kg/m²     Physical Exam  HENT:      Head: Normocephalic.   Eyes:      Conjunctiva/sclera: Conjunctivae normal.   Cardiovascular:      Rate and Rhythm: Normal rate and regular rhythm.   Pulmonary:      Effort: Pulmonary effort is normal.      Breath sounds: Normal breath sounds.   Abdominal:      General: Bowel sounds are normal.      Palpations: Abdomen is soft.   Neurological:      Mental Status: She is alert and oriented to person, place, and time.   Psychiatric:         Attention and Perception: She is inattentive.         Mood and Affect: Mood is anxious.         Speech: Speech normal.         Behavior: Behavior is cooperative.       Diane Lorenzo,     "

## 2024-03-21 DIAGNOSIS — D72.9 NEUTROPHILIA: Primary | ICD-10-CM

## 2024-03-21 DIAGNOSIS — F41.1 GAD (GENERALIZED ANXIETY DISORDER): ICD-10-CM

## 2024-03-21 LAB — B BURGDOR IGG+IGM SER QL IA: NEGATIVE

## 2024-03-21 NOTE — TELEPHONE ENCOUNTER
----- Message from Diane Lorenzo DO sent at 3/21/2024 10:49 AM EDT -----   her blood work is normal except for her CBC.  Her white count is still elevated.  I am ordering follow-up blood work for her to get completed.  If it shows any abnormality I would like for her to establish with a hematologist at that point

## 2024-03-22 DIAGNOSIS — F41.1 GAD (GENERALIZED ANXIETY DISORDER): ICD-10-CM

## 2024-03-22 RX ORDER — CLONAZEPAM 2 MG/1
2 TABLET ORAL 2 TIMES DAILY
Qty: 60 TABLET | Refills: 0 | Status: SHIPPED | OUTPATIENT
Start: 2024-03-22 | End: 2024-04-21

## 2024-03-28 RX ORDER — CLONAZEPAM 2 MG/1
2 TABLET ORAL 2 TIMES DAILY
Qty: 60 TABLET | Refills: 0 | OUTPATIENT
Start: 2024-03-28 | End: 2024-04-27

## 2024-04-02 ENCOUNTER — APPOINTMENT (OUTPATIENT)
Age: 39
End: 2024-04-02
Payer: COMMERCIAL

## 2024-04-02 DIAGNOSIS — D72.9 NEUTROPHILIA: ICD-10-CM

## 2024-04-02 LAB
BASOPHILS NFR MAR MANUAL: 2 % (ref 0–1)
EOSINOPHIL NFR BLD MANUAL: 1 % (ref 0–6)
GIANT PLATELETS BLD QL SMEAR: PRESENT
LYMPHOCYTES # BLD AUTO: 13 %
MONOCYTES NFR BLD AUTO: 7 % (ref 4–12)
NEUTS SEG NFR BLD AUTO: 74 %
PLATELET BLD QL SMEAR: ADEQUATE
RBC MORPH BLD: NORMAL
TOTAL CELLS COUNTED SPEC: 100
VARIANT LYMPHS # BLD AUTO: 3 % (ref 0–0)

## 2024-04-02 PROCEDURE — 36415 COLL VENOUS BLD VENIPUNCTURE: CPT

## 2024-04-02 PROCEDURE — 85007 BL SMEAR W/DIFF WBC COUNT: CPT

## 2024-04-05 ENCOUNTER — TELEPHONE (OUTPATIENT)
Age: 39
End: 2024-04-05

## 2024-04-05 DIAGNOSIS — E03.9 ACQUIRED HYPOTHYROIDISM: ICD-10-CM

## 2024-04-05 DIAGNOSIS — D75.839 THROMBOCYTOSIS: Primary | ICD-10-CM

## 2024-04-05 RX ORDER — LEVOTHYROXINE SODIUM 0.05 MG/1
50 TABLET ORAL
Qty: 30 TABLET | Refills: 0 | Status: SHIPPED | OUTPATIENT
Start: 2024-04-05 | End: 2024-05-05

## 2024-04-05 NOTE — TELEPHONE ENCOUNTER
Please notify pt that the peripheral smear showed that she has some large platelets. These are cells responsible for clotting. This is likely not a severe issue but I want her to follow up with a hematologist for further work up

## 2024-04-17 DIAGNOSIS — F41.1 GAD (GENERALIZED ANXIETY DISORDER): ICD-10-CM

## 2024-04-17 RX ORDER — CLONAZEPAM 2 MG/1
2 TABLET ORAL 2 TIMES DAILY
Qty: 60 TABLET | Refills: 0 | Status: SHIPPED | OUTPATIENT
Start: 2024-04-22 | End: 2024-05-22

## 2024-04-26 ENCOUNTER — OFFICE VISIT (OUTPATIENT)
Age: 39
End: 2024-04-26
Payer: COMMERCIAL

## 2024-04-26 VITALS
SYSTOLIC BLOOD PRESSURE: 110 MMHG | HEART RATE: 84 BPM | OXYGEN SATURATION: 98 % | RESPIRATION RATE: 16 BRPM | BODY MASS INDEX: 18.04 KG/M2 | WEIGHT: 126 LBS | HEIGHT: 70 IN | TEMPERATURE: 97.6 F | DIASTOLIC BLOOD PRESSURE: 60 MMHG

## 2024-04-26 DIAGNOSIS — K04.7 DENTAL INFECTION: Primary | ICD-10-CM

## 2024-04-26 PROCEDURE — 99213 OFFICE O/P EST LOW 20 MIN: CPT | Performed by: FAMILY MEDICINE

## 2024-04-26 RX ORDER — AMOXICILLIN 500 MG/1
500 CAPSULE ORAL EVERY 8 HOURS SCHEDULED
Qty: 15 CAPSULE | Refills: 0 | Status: SHIPPED | OUTPATIENT
Start: 2024-04-26 | End: 2024-05-02

## 2024-04-26 NOTE — PROGRESS NOTES
[facility]  [address] [place]    Name: Yoly Castro   YOB: 1985   MRN: [id number]  Encounter Provider: Gage Stockton MD    Encounter Date: 04/26/24       ASSESSMENT & PLAN      Assessment/Plan     Dental infection  Recurrent dental infection of multiple teeth right lower, patient reports previously prescribed amoxicillin which improved but recently returned.  At that time patient was given Tylenol/codeine for pain management.  Patient requesting for antibiotic and pain meds.  When discussed about the dentist, patient reports needing a note to go back because she has missed 1 appointment.  The reason why she was unable to go to that appointment was due to having a blood infection and not feeling well.  Discussed importance of needing dental treatment to avoid continuous treatments with antibiotic.  Discussed pain management including over-the-counter regimen and naproxen, patient declines naproxen requesting for Tylenol with codeine.  Start amoxicillin 500 mg every 8 hours for 5 days  OTC pain management discussed and recommended.  Follow-up with the dentist, follow-up with PCP  ED parameters discussed            DIAGNOSIS & ORDERS     Diagnoses and all orders for this visit:    Dental infection  -     amoxicillin (AMOXIL) 500 mg capsule; Take 1 capsule (500 mg total) by mouth every 8 (eight) hours for 5 days            FOLLOW-UP PLANS   No follow-ups on file.        Subjective:     Yoly Castro is a 38 y.o.     HPI    Review of Systems   Constitutional:  Negative for chills and fever.       Current Medication List:     Current Outpatient Medications:     amoxicillin (AMOXIL) 500 mg capsule, Take 1 capsule (500 mg total) by mouth every 8 (eight) hours for 5 days, Disp: 15 capsule, Rfl: 0    clonazePAM (KlonoPIN) 2 mg tablet, Take 1 tablet (2 mg total) by mouth 2 (two) times a day Do not start before April 22, 2024.,  "Disp: 60 tablet, Rfl: 0    levothyroxine (Euthyrox) 50 mcg tablet, Take 1 tablet (50 mcg total) by mouth daily in the early morning, Disp: 30 tablet, Rfl: 0    acetaminophen-codeine (TYLENOL/CODEINE #3) 300-30 MG per tablet, Take 1 tablet by mouth 2 (two) times a day as needed for moderate pain (Patient not taking: Reported on 4/26/2024), Disp: 15 tablet, Rfl: 0    dextromethorphan-guaifenesin (MUCINEX DM)  MG per 12 hr tablet, Take 1 tablet by mouth every 12 (twelve) hours, Disp: 30 tablet, Rfl: 0    fluticasone (FLONASE) 50 mcg/act nasal spray, 1 spray into each nostril daily (Patient not taking: Reported on 4/26/2024), Disp: 15.8 mL, Rfl: 0    hyoscyamine (LEVSIN/SL) 0.125 mg SL tablet, Take 1 tablet (0.125 mg total) by mouth every 4 (four) hours as needed for cramping (Patient not taking: Reported on 1/25/2024), Disp: 60 tablet, Rfl: 3    ketoconazole (NIZORAL) 2 % shampoo, Apply 1 application topically once a week Wash from head to thighs leave on 5 minutes and rinse weekly (Patient not taking: Reported on 7/24/2023), Disp: 120 mL, Rfl: 3    lidocaine (LIDODERM) 5 %, Place 3 patches on the skin daily as needed (Patient not taking: Reported on 4/26/2024), Disp: , Rfl:     naloxone (NARCAN) 4 mg/0.1 mL nasal spray, Administer 1 spray into a nostril. If no response after 2-3 minutes, give another dose in the other nostril using a new spray., Disp: 1 each, Rfl: 1    norethindrone-ethinyl estradiol ([facility] 1/20) 1-20 MG-MCG per tablet, Take 1 tablet by mouth daily (Patient not taking: Reported on 4/26/2024), Disp: 84 tablet, Rfl: 0      Objective:     /60 (BP Location: Left arm, Patient Position: Sitting, Cuff Size: Standard)   Pulse 84   Temp 97.6 °F (36.4 °C) (Tympanic)   Resp 16   Ht 5' 10\" (1.778 m)   Wt 57.2 kg (126 lb)   SpO2 98%   BMI 18.08 kg/m²      Physical Exam  Vitals reviewed.   Constitutional:       General: He is not in acute distress.     Appearance: Normal appearance. He is not " ill-appearing, toxic-appearing or diaphoretic.   HENT:      Head: Normocephalic and atraumatic.      Right Ear: External ear normal.      Left Ear: External ear normal.      Nose: No congestion or rhinorrhea.      Mouth/Throat:      Dentition: Abnormal dentition. Dental caries present.   Eyes:      General: No scleral icterus.        Right eye: No discharge.         Left eye: No discharge.      Conjunctiva/sclera: Conjunctivae normal.   Cardiovascular:      Rate and Rhythm: Normal rate.   Pulmonary:      Effort: Pulmonary effort is normal. No respiratory distress.   Neurological:      General: No focal deficit present.      Mental Status: He is alert and oriented to person, place, and time.   Psychiatric:         Mood and Affect: Mood normal.         Behavior: Behavior normal.         Thought Content: Thought content normal.           Gage Stockton MD  Family Medicine Physician   [facility]

## 2024-04-30 ENCOUNTER — TELEPHONE (OUTPATIENT)
Age: 39
End: 2024-04-30

## 2024-04-30 ENCOUNTER — OFFICE VISIT (OUTPATIENT)
Age: 39
End: 2024-04-30
Payer: COMMERCIAL

## 2024-04-30 VITALS
WEIGHT: 129.4 LBS | HEART RATE: 84 BPM | RESPIRATION RATE: 18 BRPM | DIASTOLIC BLOOD PRESSURE: 62 MMHG | BODY MASS INDEX: 18.52 KG/M2 | OXYGEN SATURATION: 96 % | HEIGHT: 70 IN | TEMPERATURE: 98.1 F | SYSTOLIC BLOOD PRESSURE: 106 MMHG

## 2024-04-30 DIAGNOSIS — E03.9 ACQUIRED HYPOTHYROIDISM: ICD-10-CM

## 2024-04-30 DIAGNOSIS — M54.2 CHRONIC NECK AND BACK PAIN: ICD-10-CM

## 2024-04-30 DIAGNOSIS — G89.29 CHRONIC NECK AND BACK PAIN: ICD-10-CM

## 2024-04-30 DIAGNOSIS — K08.9 CHRONIC DENTAL PAIN: Primary | ICD-10-CM

## 2024-04-30 DIAGNOSIS — G89.29 CHRONIC DENTAL PAIN: Primary | ICD-10-CM

## 2024-04-30 DIAGNOSIS — M54.9 CHRONIC NECK AND BACK PAIN: ICD-10-CM

## 2024-04-30 PROCEDURE — 3725F SCREEN DEPRESSION PERFORMED: CPT | Performed by: FAMILY MEDICINE

## 2024-04-30 PROCEDURE — 99214 OFFICE O/P EST MOD 30 MIN: CPT | Performed by: FAMILY MEDICINE

## 2024-04-30 RX ORDER — ACETAMINOPHEN AND CODEINE PHOSPHATE 300; 30 MG/1; MG/1
1 TABLET ORAL 2 TIMES DAILY PRN
Qty: 14 TABLET | Refills: 0 | Status: SHIPPED | OUTPATIENT
Start: 2024-04-30

## 2024-04-30 NOTE — TELEPHONE ENCOUNTER
Spoke with patient, Yoly, to advise letter was faxed to dentist office.    Patient expressed gratitude and understanding.

## 2024-04-30 NOTE — LETTER
April 30, 2024     Patient: Yoly Castro  YOB: 1985  Date of Visit: 4/30/2024      To Whom it May Concern:    Yoly Casrto is under my professional care. Please be aware that she was significantly impaired on 2/29/2024 due to an acute illness. This prevented her from making her appointment on said day. Please excuse.   Sincerely,          Dr. Diane Lorenzo DO

## 2024-04-30 NOTE — TELEPHONE ENCOUNTER
Patient called regarding letter for dental care needs to be faxed to [facility] at [phone].  Dental office will NOT schedule an appointment for the patient without the letter.    Please have provider sign the letter again, and fax to dental office.    If needed, [facility] office can be reached at [phone].    Patient attempted to fax, but could not locate a fax service.

## 2024-04-30 NOTE — PROGRESS NOTES
Name: Yoly Castro      : 1985      MRN: [id number]  Encounter Provider: Diane Lorenzo DO  Encounter Date: 2024   Encounter department: [facility] CARE [place]    Assessment & Plan     1. Chronic dental pain- persistent despite recent completion of antibiotics. Otc analgesia is ineffective for pain control. Will provide a few days a T3. Note provide for dental appointment. Pt encouraged to follow up as soon as possible.     2. Acquired hypothyroidism- TSH now wnl. Pt to continue levothyroxine 50mcg qd.            Subjective      Recently finished antiboitc. Contines to have some swelling around the tooth. Needs a note to follow up with the dentist . Continues to have pain despite completing antibiotics.             Current Outpatient Medications on File Prior to Visit   Medication Sig    amoxicillin (AMOXIL) 500 mg capsule Take 1 capsule (500 mg total) by mouth every 8 (eight) hours for 5 days    clonazePAM (KlonoPIN) 2 mg tablet Take 1 tablet (2 mg total) by mouth 2 (two) times a day Do not start before 2024.    dextromethorphan-guaifenesin (MUCINEX DM)  MG per 12 hr tablet Take 1 tablet by mouth every 12 (twelve) hours    levothyroxine (Euthyrox) 50 mcg tablet Take 1 tablet (50 mcg total) by mouth daily in the early morning    acetaminophen-codeine (TYLENOL/CODEINE #3) 300-30 MG per tablet Take 1 tablet by mouth 2 (two) times a day as needed for moderate pain (Patient not taking: Reported on 2024)    fluticasone (FLONASE) 50 mcg/act nasal spray 1 spray into each nostril daily (Patient not taking: Reported on 2024)    hyoscyamine (LEVSIN/SL) 0.125 mg SL tablet Take 1 tablet (0.125 mg total) by mouth every 4 (four) hours as needed for cramping (Patient not taking: Reported on 2024)    ketoconazole (NIZORAL) 2 % shampoo Apply 1 application topically once a week Wash from head to thighs leave on 5 minutes and rinse weekly (Patient not taking: Reported  "on 7/24/2023)    lidocaine (LIDODERM) 5 % Place 3 patches on the skin daily as needed (Patient not taking: Reported on 4/26/2024)    naloxone (NARCAN) 4 mg/0.1 mL nasal spray Administer 1 spray into a nostril. If no response after 2-3 minutes, give another dose in the other nostril using a new spray.    norethindrone-ethinyl estradiol (Jamilah REHMAN 1/20) 1-20 MG-MCG per tablet Take 1 tablet by mouth daily (Patient not taking: Reported on 4/26/2024)       Objective     /62 (BP Location: Left arm, Patient Position: Sitting, Cuff Size: Standard)   Pulse 84   Temp 98.1 °F (36.7 °C) (Tympanic)   Resp 18   Ht 5' 10\" (1.778 m)   Wt 58.7 kg (129 lb 6.4 oz)   SpO2 96%   BMI 18.57 kg/m²     Physical Exam  HENT:      Head: Atraumatic.      Mouth/Throat:      Dentition: Dental tenderness and dental caries present.   Cardiovascular:      Rate and Rhythm: Normal rate.   Pulmonary:      Effort: Pulmonary effort is normal.   Neurological:      Mental Status: He is alert and oriented to person, place, and time.   Psychiatric:         Mood and Affect: Mood normal.         Behavior: Behavior normal.       Diane Lorenzo, DO    "

## 2024-05-02 ENCOUNTER — OFFICE VISIT (OUTPATIENT)
Dept: HEMATOLOGY ONCOLOGY | Facility: CLINIC | Age: 39
End: 2024-05-02
Payer: COMMERCIAL

## 2024-05-02 VITALS
SYSTOLIC BLOOD PRESSURE: 118 MMHG | WEIGHT: 131 LBS | HEART RATE: 89 BPM | DIASTOLIC BLOOD PRESSURE: 60 MMHG | HEIGHT: 70 IN | BODY MASS INDEX: 18.75 KG/M2 | OXYGEN SATURATION: 97 % | TEMPERATURE: 99 F

## 2024-05-02 DIAGNOSIS — D72.828 OTHER ELEVATED WHITE BLOOD CELL (WBC) COUNT: Primary | ICD-10-CM

## 2024-05-02 DIAGNOSIS — D75.839 THROMBOCYTOSIS: ICD-10-CM

## 2024-05-02 PROCEDURE — 99203 OFFICE O/P NEW LOW 30 MIN: CPT | Performed by: PHYSICIAN ASSISTANT

## 2024-05-02 NOTE — PROGRESS NOTES
[facility] [place]  [address]  [place] [address]  Hematology Ambulatory Consult  Yoly Castro, 1985, [id number]  5/2/2024      Assessment and Plan   1. Thrombocytosis  - I am unable to find any evidence of thrombocytosis in her charting.    2. Other elevated white blood cell (WBC) count  - Patient does however have history of leukocytosis with WBC count 15.30 on 1/25/2024.  Hemoglobin and platelet count were normal at that time.  On 3/20/2024 WBC count improved to 11.28 (neutrophil predominant).  Hemoglobin is normal at 15.2 and platelets are normal at 346.    We discussed that leukocytosis is a common sign of infection, particularly bacterial. Other nonmalignant etiologies of leukocytosis include certain medications, asplenia, smoking, obesity, and chronic inflammatory conditions. Lymphoproliferative disorders or MPN can also cause leukocytosis. Symptoms suggestive of a hematologic malignancy include fever, weight loss, bruising, or fatigue.      Yoly unfortunately has been dealing with recurrent dental issues for around the past 3 months time (this is the same amount of time that her WBC count has been slightly elevated).  I suspect that her leukocytosis is related to same.    I encouraged her to follow-up with her dentist for management.    Patient will be made PRN.  Her PCP should repeat a CBC a few weeks after her dental issues have resolved.  Patient voiced agreement and understanding to the above.   Patient knows to call the Hematology/Oncology office with any questions and concerns regarding the above.    Barrier(s) to care: None.   The patient is  able to self care.    Subjective     Chief Complaint   Patient presents with    Consult    Leukocytosis       Referring provider    Diane Lorenzo DO  [address] [facility] [place] [address]    History of present illness:   Patient presents for evaluation of abnormal  CBC.    She had a finding of leukocytosis with elevated WBC count on 1/25/2024.  White count at that time was 15.30.  Hemoglobin and platelets were normal at that time.    Lab work was repeated on 3/20/2024.  WBC count had improved to 11.28 at that time.  Differential is neutrophil predominant.    She has been dealing with a tooth infection for around the past 3 months time.  She was placed on a course of antibiotics a few months ago.  More recently she was again placed on a course of amoxicillin.    She says that she has been following with her dentist.  She is unsure when she will be able to have dental work completed due to large cost. She has significant oral pain and has been taking Tylenol #3 for relief.    Patient says that she has been having some brain fog and increased fatigue.  No unexplained fevers, drenching night sweats.  She reports she lost some weight but per charting she has actually gained a few pounds.    She is a chronic smoker of 1.5 ppd lifelong.  She denies nausea, vomiting, bowel changes, chest pain, shortness of breath.    Review of Systems   Constitutional:  Positive for fatigue. Negative for appetite change, fever and unexpected weight change.   HENT:  Negative for nosebleeds.    Respiratory:  Negative for cough, choking and shortness of breath.         Negative hemoptysis.   Cardiovascular:  Negative for chest pain, palpitations and leg swelling.   Gastrointestinal: Negative.  Negative for abdominal distention, abdominal pain, anal bleeding, blood in stool, constipation, diarrhea, nausea and vomiting.   Endocrine: Negative.  Negative for cold intolerance.   Genitourinary: Negative.  Negative for hematuria, menstrual problem, vaginal bleeding, vaginal discharge and vaginal pain.   Musculoskeletal: Negative.  Negative for arthralgias, myalgias, neck pain and neck stiffness.   Skin: Negative.  Negative for color change, pallor and rash.   Allergic/Immunologic: Negative.  Negative for  immunocompromised state.   Neurological: Negative.  Negative for weakness and headaches.   Hematological:  Negative for adenopathy. Does not bruise/bleed easily.   All other systems reviewed and are negative.      Past Medical History:   Diagnosis Date    Anxiety     Bipolar disorder (HCC)     Closed nondisplaced fracture of phalanx of left middle finger with routine healing 08/09/2022    Depression     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 03/25/2021    OCD (obsessive compulsive disorder)      Past Surgical History:   Procedure Laterality Date    BRAIN SURGERY      KNEE SURGERY       No family history on file.  Social History     Socioeconomic History    Marital status: Single     Spouse name: None    Number of children: None    Years of education: None    Highest education level: None   Occupational History    None   Tobacco Use    Smoking status: Every Day     Current packs/day: 0.50     Types: Cigarettes    Smokeless tobacco: Never   Vaping Use    Vaping status: Never Used   Substance and Sexual Activity    Alcohol use: Not Currently    Drug use: Not Currently     Types: Marijuana    Sexual activity: Yes   Other Topics Concern    None   Social History Narrative    None     Social Determinants of Health     Financial Resource Strain: Not on file   Food Insecurity: Not on file   Transportation Needs: Not on file   Physical Activity: Inactive (4/26/2024)    Exercise Vital Sign     Days of Exercise per Week: 0 days     Minutes of Exercise per Session: 0 min   Stress: No Stress Concern Present (8/9/2022)    [employer] [place] [employer] - Occupational Stress Questionnaire     Feeling of Stress : Not at all   Social Connections: Not on file   Intimate Partner Violence: Not on file   Housing Stability: Not on file         Current Outpatient Medications:     acetaminophen-codeine (TYLENOL/CODEINE #3) 300-30 MG per tablet, Take 1 tablet by mouth 2 (two) times a day as needed for  "moderate pain, Disp: 14 tablet, Rfl: 0    amoxicillin (AMOXIL) 500 mg capsule, Take 1 capsule (500 mg total) by mouth every 8 (eight) hours for 5 days, Disp: 15 capsule, Rfl: 0    clonazePAM (KlonoPIN) 2 mg tablet, Take 1 tablet (2 mg total) by mouth 2 (two) times a day Do not start before April 22, 2024., Disp: 60 tablet, Rfl: 0    levothyroxine (Euthyrox) 50 mcg tablet, Take 1 tablet (50 mcg total) by mouth daily in the early morning, Disp: 30 tablet, Rfl: 0    dextromethorphan-guaifenesin (MUCINEX DM)  MG per 12 hr tablet, Take 1 tablet by mouth every 12 (twelve) hours (Patient not taking: Reported on 5/2/2024), Disp: 30 tablet, Rfl: 0    fluticasone (FLONASE) 50 mcg/act nasal spray, 1 spray into each nostril daily (Patient not taking: Reported on 4/26/2024), Disp: 15.8 mL, Rfl: 0    hyoscyamine (LEVSIN/SL) 0.125 mg SL tablet, Take 1 tablet (0.125 mg total) by mouth every 4 (four) hours as needed for cramping (Patient not taking: Reported on 1/25/2024), Disp: 60 tablet, Rfl: 3    ketoconazole (NIZORAL) 2 % shampoo, Apply 1 application topically once a week Wash from head to thighs leave on 5 minutes and rinse weekly (Patient not taking: Reported on 7/24/2023), Disp: 120 mL, Rfl: 3    lidocaine (LIDODERM) 5 %, Place 3 patches on the skin daily as needed (Patient not taking: Reported on 4/26/2024), Disp: , Rfl:     naloxone (NARCAN) 4 mg/0.1 mL nasal spray, Administer 1 spray into a nostril. If no response after 2-3 minutes, give another dose in the other nostril using a new spray., Disp: 1 each, Rfl: 1    norethindrone-ethinyl estradiol ([facility] 1/20) 1-20 MG-MCG per tablet, Take 1 tablet by mouth daily (Patient not taking: Reported on 4/26/2024), Disp: 84 tablet, Rfl: 0  Allergies   Allergen Reactions    Cortisone Rash and Swelling    Fluoxetine Confusion and Other (See Comments)     Patient states she went \"crazy, nuts.\" Unable to specify more clearly.    Pregabalin Rash and Swelling    Fentanyl Hives    " " Fever and migraine      Nitrofurantoin Swelling    Acetaminophen Rash    Hydrocodone-Acetaminophen Rash       Objective   /60   Pulse 89   Temp 99 °F (37.2 °C)   Ht 5' 10\" (1.778 m)   Wt 59.4 kg (131 lb)   SpO2 97%   BMI 18.80 kg/m²   Physical Exam  Constitutional:       General: He is not in acute distress.     Appearance: He is well-developed.      Comments: thin   HENT:      Head: Normocephalic and atraumatic.      Mouth/Throat:      Comments: Dental caries present  Eyes:      General: No scleral icterus.     Conjunctiva/sclera: Conjunctivae normal.   Cardiovascular:      Rate and Rhythm: Normal rate and regular rhythm.   Pulmonary:      Effort: Pulmonary effort is normal. No respiratory distress.      Breath sounds: Normal breath sounds.   Abdominal:      General: Abdomen is flat. There is no distension.      Palpations: Abdomen is soft.      Tenderness: There is no abdominal tenderness.   Skin:     General: Skin is warm.      Coloration: Skin is not pale.      Findings: No rash.   Neurological:      Mental Status: He is alert and oriented to person, place, and time.   Psychiatric:         Mood and Affect: Mood normal.         Thought Content: Thought content normal.         Judgment: Judgment normal.     Extremities: No lower extremity edema bilaterally.    Result Review  Labs:   Latest Reference Range & Units 01/25/24 12:02 03/20/24 14:17 04/02/24 10:36   WBC 4.31 - 10.16 Thousand/uL 15.30 (H) 11.28 (H)    RBC 3.81 - 5.12 Million/uL 4.40 4.94    Hemoglobin 11.5 - 15.4 g/dL 13.8 15.2    Hematocrit 34.8 - 46.1 % 41.6 47.3 (H)    MCV 82 - 98 fL 95 96    MCH 26.8 - 34.3 pg 31.4 30.8    MCHC 31.4 - 37.4 g/dL 33.2 32.1    RDW 11.6 - 15.1 % 13.4 14.4    Platelet Count 149 - 390 Thousands/uL 283 346    MPV 8.9 - 12.7 fL 11.3 10.7    Platelet Estimate Adequate    Adequate   nRBC /100 WBCs  0    Segmented % %  80 (H) 74   Lymphocytes % %  14 13   Monocytes % 4 - 12 %  5 7   Eosinophils % 0 - 6 %  0 1 "   Basophils % 0 - 1 %  1 2 (H)   Immature Grans % 0 - 2 %  0    Atypical Lymphocytes % 0 - 0 %   3 (H)   Absolute Immature Grans 0.00 - 0.20 Thousand/uL  0.04    Absolute Neutrophils 1.85 - 7.62 Thousands/µL  9.00 (H)    Absolute Lymphocytes 0.60 - 4.47 Thousands/µL  1.61    Absolute Monocytes 0.17 - 1.22 Thousand/µL  0.53    Absolute Eosinophils 0.00 - 0.61 Thousand/µL  0.04    Absolute Basophils 0.00 - 0.10 Thousands/µL  0.06    Total Counted    100   RBC Morphology    Normal   Giant PLTs    Present   (H): Data is abnormally high    Please note:  This report has been generated by a voice recognition software system. Therefore there may be syntax, spelling, and/or grammatical errors. Please call if you have any questions.

## 2024-05-17 DIAGNOSIS — F41.1 GAD (GENERALIZED ANXIETY DISORDER): ICD-10-CM

## 2024-05-17 DIAGNOSIS — E03.9 ACQUIRED HYPOTHYROIDISM: ICD-10-CM

## 2024-05-17 RX ORDER — LEVOTHYROXINE SODIUM 0.05 MG/1
50 TABLET ORAL
Qty: 30 TABLET | Refills: 5 | Status: SHIPPED | OUTPATIENT
Start: 2024-05-17 | End: 2024-11-13

## 2024-05-17 NOTE — TELEPHONE ENCOUNTER
Medication: clonazePAM (KlonoPIN) 2 mg tablet     Dose/Frequency: Take 1 tablet (2 mg total) by mouth 2 (two) times a day Do not start before April 22, 2024     Quantity: 60 tablet     Pharmacy: [facility] #[address]     Office:   [x] PCP/Provider -   [] Speciality/Provider -     Does the patient have enough for 3 days?   [x] Yes   [] No - Send as HP to POD    Medication: levothyroxine (Euthyrox) 50 mcg tablet     Dose/Frequency: Take 1 tablet (50 mcg total) by mouth daily in the early morning     Quantity: 30 tablet     Pharmacy: [facility] #[address]     Office:   [x] PCP/Provider -   [] Speciality/Provider -     Does the patient have enough for 3 days?   [x] Yes   [] No - Send as HP to POD

## 2024-05-18 DIAGNOSIS — F41.1 GAD (GENERALIZED ANXIETY DISORDER): ICD-10-CM

## 2024-05-20 RX ORDER — CLONAZEPAM 2 MG/1
2 TABLET ORAL 2 TIMES DAILY
Qty: 60 TABLET | Refills: 0 | Status: SHIPPED | OUTPATIENT
Start: 2024-05-20 | End: 2024-06-19

## 2024-05-22 RX ORDER — CLONAZEPAM 2 MG/1
2 TABLET ORAL 2 TIMES DAILY
Qty: 60 TABLET | Refills: 0 | OUTPATIENT
Start: 2024-05-22 | End: 2024-06-21

## 2024-06-17 ENCOUNTER — OFFICE VISIT (OUTPATIENT)
Age: 39
End: 2024-06-17
Payer: COMMERCIAL

## 2024-06-17 ENCOUNTER — APPOINTMENT (OUTPATIENT)
Age: 39
End: 2024-06-17
Payer: COMMERCIAL

## 2024-06-17 VITALS
TEMPERATURE: 98.1 F | BODY MASS INDEX: 18.07 KG/M2 | HEART RATE: 83 BPM | SYSTOLIC BLOOD PRESSURE: 90 MMHG | OXYGEN SATURATION: 96 % | DIASTOLIC BLOOD PRESSURE: 60 MMHG | RESPIRATION RATE: 18 BRPM | WEIGHT: 126.2 LBS | HEIGHT: 70 IN

## 2024-06-17 DIAGNOSIS — D72.9 NEUTROPHILIA: ICD-10-CM

## 2024-06-17 DIAGNOSIS — F41.1 GAD (GENERALIZED ANXIETY DISORDER): ICD-10-CM

## 2024-06-17 DIAGNOSIS — E03.9 ACQUIRED HYPOTHYROIDISM: ICD-10-CM

## 2024-06-17 DIAGNOSIS — M25.561 CHRONIC PAIN OF RIGHT KNEE: ICD-10-CM

## 2024-06-17 DIAGNOSIS — R06.2 WHEEZING: ICD-10-CM

## 2024-06-17 DIAGNOSIS — D72.9 NEUTROPHILIA: Primary | ICD-10-CM

## 2024-06-17 DIAGNOSIS — G89.29 CHRONIC PAIN OF RIGHT KNEE: ICD-10-CM

## 2024-06-17 DIAGNOSIS — J06.9 URI WITH COUGH AND CONGESTION: ICD-10-CM

## 2024-06-17 LAB
BASOPHILS # BLD AUTO: 0.07 THOUSANDS/ÂΜL (ref 0–0.1)
BASOPHILS NFR BLD AUTO: 1 % (ref 0–1)
EOSINOPHIL # BLD AUTO: 0.16 THOUSAND/ÂΜL (ref 0–0.61)
EOSINOPHIL NFR BLD AUTO: 2 % (ref 0–6)
ERYTHROCYTE [DISTWIDTH] IN BLOOD BY AUTOMATED COUNT: 13.1 % (ref 11.6–15.1)
HCT VFR BLD AUTO: 42.3 % (ref 36.5–46.1)
HGB BLD-MCNC: 13.8 G/DL (ref 12–15.4)
IMM GRANULOCYTES # BLD AUTO: 0.03 THOUSAND/UL (ref 0–0.2)
IMM GRANULOCYTES NFR BLD AUTO: 0 % (ref 0–2)
LYMPHOCYTES # BLD AUTO: 2.69 THOUSANDS/ÂΜL (ref 0.6–4.47)
LYMPHOCYTES NFR BLD AUTO: 30 % (ref 14–44)
MCH RBC QN AUTO: 31.3 PG (ref 26.8–34.3)
MCHC RBC AUTO-ENTMCNC: 32.6 G/DL (ref 31.4–37.4)
MCV RBC AUTO: 96 FL (ref 82–98)
MONOCYTES # BLD AUTO: 0.42 THOUSAND/ÂΜL (ref 0.17–1.22)
MONOCYTES NFR BLD AUTO: 5 % (ref 4–12)
NEUTROPHILS # BLD AUTO: 5.52 THOUSANDS/ÂΜL (ref 1.85–7.62)
NEUTS SEG NFR BLD AUTO: 62 % (ref 43–75)
NRBC BLD AUTO-RTO: 0 /100 WBCS
PLATELET # BLD AUTO: 300 THOUSANDS/UL (ref 149–390)
PMV BLD AUTO: 11 FL (ref 8.9–12.7)
RBC # BLD AUTO: 4.41 MILLION/UL (ref 3.88–5.12)
WBC # BLD AUTO: 8.89 THOUSAND/UL (ref 4.31–10.16)

## 2024-06-17 PROCEDURE — 99214 OFFICE O/P EST MOD 30 MIN: CPT | Performed by: FAMILY MEDICINE

## 2024-06-17 PROCEDURE — 36415 COLL VENOUS BLD VENIPUNCTURE: CPT

## 2024-06-17 PROCEDURE — 85025 COMPLETE CBC W/AUTO DIFF WBC: CPT

## 2024-06-17 RX ORDER — CLONAZEPAM 2 MG/1
2 TABLET ORAL 2 TIMES DAILY
Qty: 60 TABLET | Refills: 0 | Status: CANCELLED | OUTPATIENT
Start: 2024-06-17 | End: 2024-07-17

## 2024-06-17 RX ORDER — LEVOTHYROXINE SODIUM 0.05 MG/1
50 TABLET ORAL
Qty: 30 TABLET | Refills: 5 | Status: SHIPPED | OUTPATIENT
Start: 2024-06-17 | End: 2024-12-14

## 2024-06-17 RX ORDER — ALBUTEROL SULFATE 90 UG/1
2 AEROSOL, METERED RESPIRATORY (INHALATION) EVERY 6 HOURS PRN
Qty: 18 G | Refills: 5 | Status: SHIPPED | OUTPATIENT
Start: 2024-06-17

## 2024-06-17 RX ORDER — CLONAZEPAM 2 MG/1
2 TABLET ORAL 2 TIMES DAILY
Qty: 60 TABLET | Refills: 0 | Status: SHIPPED | OUTPATIENT
Start: 2024-06-17 | End: 2024-07-17

## 2024-06-17 RX ORDER — BENZONATATE 200 MG/1
200 CAPSULE ORAL 3 TIMES DAILY PRN
Qty: 20 CAPSULE | Refills: 0 | Status: SHIPPED | OUTPATIENT
Start: 2024-06-17

## 2024-06-17 NOTE — TELEPHONE ENCOUNTER
Medication: Klonopin    Dose/Frequency: 2mg; take one tablet orally twice daily    Quantity: 60 tablets    Pharmacy: [id number]  [address] [place] [address]  [phone]    Office:   [x] PCP/Provider - Dr Lorenzo  [] Speciality/Provider -     Does the patient have enough for 3 days?   [x] Yes   [] No - Send as HP to POD

## 2024-06-18 ENCOUNTER — TELEPHONE (OUTPATIENT)
Age: 39
End: 2024-06-18

## 2024-06-18 NOTE — TELEPHONE ENCOUNTER
----- Message from Diane Lorenzo DO sent at 6/18/2024  8:56 AM EDT -----  Cbc is normal. Infection is cleared

## 2024-06-28 NOTE — PROGRESS NOTES
"Ambulatory Visit  Name: Yoly Castro      : 1985      MRN: [id number]  Encounter Provider: Diane Lorenzo DO  Encounter Date: 2024   Encounter department: [facility] [place]    Assessment & Plan   1. Neutrophilia- thought ot be secondary to recurrent dental infection. She is s/p extraction and abx now will repeat CBC to monitor for resolution.   -     CBC and differential; Future  2. URI with cough and congestion  -     dextromethorphan-guaifenesin (MUCINEX DM)  MG per 12 hr tablet; Take 1 tablet by mouth every 12 (twelve) hours  -     benzonatate (TESSALON) 200 MG capsule; Take 1 capsule (200 mg total) by mouth 3 (three) times a day as needed for cough  3. Chronic pain of right knee  -     Ambulatory Referral to Orthopedic Surgery; Future  4. Wheezing- noted on exam. 2/2 bronchospasm. Pt encouraged ot use albuterol inhaler and consider smoking cessation.   -     albuterol (Ventolin HFA) 90 mcg/act inhaler; Inhale 2 puffs every 6 (six) hours as needed for wheezing  5. Acquired hypothyroidism- tsh wnl on current dose of levothyroxine. Refill provided.   -     levothyroxine (Euthyrox) 50 mcg tablet; Take 1 tablet (50 mcg total) by mouth daily in the early morning       History of Present Illness     Pt presents for f/u  Dicussed heme/onc evaluation  Pt c/o URI symptoms. Onset x 3-4 days.         Review of Systems   Constitutional:  Positive for fatigue. Negative for fever.   HENT:  Positive for congestion and sore throat.    Respiratory:  Positive for cough and shortness of breath.        Objective     BP 90/60 (BP Location: Left arm, Patient Position: Sitting, Cuff Size: Standard)   Pulse 83   Temp 98.1 °F (36.7 °C) (Tympanic)   Resp 18   Ht 5' 10\" (1.778 m)   Wt 57.2 kg (126 lb 3.2 oz)   SpO2 96%   BMI 18.11 kg/m²     Physical Exam  HENT:      Head: Normocephalic and atraumatic.      Right Ear: External ear normal.      Left Ear: External ear normal.   Eyes:      " Conjunctiva/sclera: Conjunctivae normal.      Pupils: Pupils are equal, round, and reactive to light.   Cardiovascular:      Rate and Rhythm: Normal rate and regular rhythm.   Pulmonary:      Effort: Pulmonary effort is normal.      Breath sounds: Wheezing present.   Neurological:      Mental Status: He is alert and oriented to person, place, and time.      Gait: Gait normal.       Administrative Statements

## 2024-07-11 ENCOUNTER — APPOINTMENT (OUTPATIENT)
Dept: RADIOLOGY | Facility: CLINIC | Age: 39
End: 2024-07-11
Payer: COMMERCIAL

## 2024-07-11 ENCOUNTER — OFFICE VISIT (OUTPATIENT)
Dept: OBGYN CLINIC | Facility: CLINIC | Age: 39
End: 2024-07-11
Payer: COMMERCIAL

## 2024-07-11 ENCOUNTER — TELEPHONE (OUTPATIENT)
Age: 39
End: 2024-07-11

## 2024-07-11 VITALS
HEART RATE: 67 BPM | WEIGHT: 124.2 LBS | BODY MASS INDEX: 17.78 KG/M2 | DIASTOLIC BLOOD PRESSURE: 84 MMHG | SYSTOLIC BLOOD PRESSURE: 117 MMHG | HEIGHT: 70 IN

## 2024-07-11 DIAGNOSIS — M17.11 PATELLOFEMORAL ARTHRITIS OF RIGHT KNEE: ICD-10-CM

## 2024-07-11 DIAGNOSIS — G89.29 CHRONIC PAIN OF RIGHT KNEE: ICD-10-CM

## 2024-07-11 DIAGNOSIS — M17.11 PRIMARY OSTEOARTHRITIS OF RIGHT KNEE: Primary | ICD-10-CM

## 2024-07-11 DIAGNOSIS — M25.561 CHRONIC PAIN OF RIGHT KNEE: ICD-10-CM

## 2024-07-11 PROCEDURE — 73564 X-RAY EXAM KNEE 4 OR MORE: CPT

## 2024-07-11 PROCEDURE — 99214 OFFICE O/P EST MOD 30 MIN: CPT | Performed by: FAMILY MEDICINE

## 2024-07-11 RX ORDER — DICLOFENAC SODIUM 75 MG/1
75 TABLET, DELAYED RELEASE ORAL 2 TIMES DAILY
Qty: 60 TABLET | Refills: 1 | Status: SHIPPED | OUTPATIENT
Start: 2024-07-11 | End: 2024-07-12 | Stop reason: CLARIF

## 2024-07-11 NOTE — PATIENT INSTRUCTIONS
Rx: Diclofenac 75 mg  - twice daily  - eat first  - everyday  - 30 days  - no ibuprofen  - no aleve  - tylenol is ok

## 2024-07-11 NOTE — PROGRESS NOTES
Assessment/Plan:  Assessment & Plan   Diagnoses and all orders for this visit:    Primary osteoarthritis of right knee  -     Ambulatory Referral to Orthopedic Surgery  -     XR knee 4+ vw right injury; Future  -     diclofenac (VOLTAREN) 75 mg EC tablet; Take 1 tablet (75 mg total) by mouth 2 (two) times a day  -     Injection Procedure Prior Authorization; Future    Patellofemoral arthritis of right knee      38-year-old female with right knee pain, swelling, and clicking more than 3 months duration.  Discussed the patient physical exam, imaging studies, impression, and plan.  X-rays right knee noted for status post ACL surgery with degenerative changes.  Her imaging studies, physical exam, and symptoms are consistent with symptomology from degenerative changes of the knee.  I discussed treatment regimen anti-inflammatory.  Advised surgery not warranted at this time.  She is to take diclofenac 75 mg twice daily with food for 30 days and not to take ibuprofen or Aleve, but may take Tylenol.  She may benefit from steroid injection however patient has allergic/adverse reaction to steroid injection.  Since steroid injection is contraindicated I discussed treatment option visco injection to which she agreed.  Will request for 1 shot visco injection to the right knee.  She will return for Visco injection once approved.      Subjective:   Patient ID: Yoly Castro is a 38 y.o. female.  Chief Complaint   Patient presents with    Right Knee - Pain        38-year-old female presents for evaluation of right knee pain more than 3 months duration.  She denies trauma or inciting event.  Pain described as gradual in onset, localized to the anterior aspect of the knee and symptoms worse at the medial and lateral aspects, achy and throbbing and sometimes sharp and shooting, worse with bearing weight and ambulating, associated with swelling, and improved with resting.  She has been managing symptoms with resting, icing, and taking  "over-the-counter medications.  She is status post right knee ACL surgery more than 10 years ago.  She was seen by primary care physician and referred to orthopedic care.    Knee Pain  This is a new problem. The current episode started more than 1 month ago. The problem occurs daily. The problem has been gradually worsening. Associated symptoms include arthralgias and joint swelling. Pertinent negatives include no numbness or weakness. The symptoms are aggravated by standing, walking and bending. She has tried rest, position changes, NSAIDs, ice and acetaminophen for the symptoms. The treatment provided mild relief.               Review of Systems   Musculoskeletal:  Positive for arthralgias and joint swelling.   Neurological:  Negative for weakness and numbness.       Objective:  Vitals:    07/11/24 1107   BP: 117/84   Pulse: 67   Weight: 56.3 kg (124 lb 3.2 oz)   Height: 5' 10\" (1.778 m)      Right Knee Exam     Muscle Strength   The patient has normal right knee strength.    Tenderness   Right knee tenderness location: Patella facet.    Range of Motion   Extension:  normal   Flexion:  130     Tests   Varus: negative Valgus: negative  Lachman:  Anterior - negative        Other   Swelling: mild    Comments:  Positive patella inhibition and grind            Physical Exam  Vitals and nursing note reviewed.   Constitutional:       Appearance: Normal appearance. She is well-developed. She is not ill-appearing or diaphoretic.   HENT:      Head: Normocephalic and atraumatic.      Right Ear: External ear normal.      Left Ear: External ear normal.   Eyes:      Conjunctiva/sclera: Conjunctivae normal.   Neck:      Trachea: No tracheal deviation.   Cardiovascular:      Rate and Rhythm: Normal rate.   Pulmonary:      Effort: Pulmonary effort is normal. No respiratory distress.   Abdominal:      General: There is no distension.   Musculoskeletal:         General: Tenderness present.   Skin:     General: Skin is warm and dry. "      Coloration: Skin is not jaundiced or pale.   Neurological:      Mental Status: She is alert and oriented to person, place, and time.   Psychiatric:         Mood and Affect: Mood normal.         Behavior: Behavior normal.         Thought Content: Thought content normal.         Judgment: Judgment normal.         I have personally reviewed pertinent films in PACS and my interpretation is  .  X-rays right knee noted for status post ACL surgery with degenerative changes.

## 2024-07-11 NOTE — LETTER
July 11, 2024     Diane Lorenzo, DO  [address] [facility] [address]    Patient: Yoly Casrto   YOB: 1985   Date of Visit: 7/11/2024       Dear Dr. Lorenzo:    Thank you for referring Yoly Castro to me for evaluation. Below are my notes for this consultation.    If you have questions, please do not hesitate to call me. I look forward to following your patient along with you.         Sincerely,        Cary Loyd DO        CC: No Recipients    Cary Loyd DO  7/11/2024 12:19 PM  Sign when Signing Visit  Assessment/Plan:  Assessment & Plan  Diagnoses and all orders for this visit:    Primary osteoarthritis of right knee  -     Ambulatory Referral to Orthopedic Surgery  -     XR knee 4+ vw right injury; Future  -     diclofenac (VOLTAREN) 75 mg EC tablet; Take 1 tablet (75 mg total) by mouth 2 (two) times a day  -     Injection Procedure Prior Authorization; Future    Patellofemoral arthritis of right knee      38-year-old female with right knee pain, swelling, and clicking more than 3 months duration.  Discussed the patient physical exam, imaging studies, impression, and plan.  X-rays right knee noted for status post ACL surgery with degenerative changes.  Her imaging studies, physical exam, and symptoms are consistent with symptomology from degenerative changes of the knee.  I discussed treatment regimen anti-inflammatory.  Advised surgery not warranted at this time.  She is to take diclofenac 75 mg twice daily with food for 30 days and not to take ibuprofen or Aleve, but may take Tylenol.  She may benefit from steroid injection however patient has allergic/adverse reaction to steroid injection.  Since steroid injection is contraindicated I discussed treatment option visco injection to which she agreed.  Will request for 1 shot visco injection to the right knee.  She will return for Visco injection once approved.      Subjective:   Patient ID:  "Yoly Castro is a 38 y.o. female.  Chief Complaint   Patient presents with   • Right Knee - Pain        38-year-old female presents for evaluation of right knee pain more than 3 months duration.  She denies trauma or inciting event.  Pain described as gradual in onset, localized to the anterior aspect of the knee and symptoms worse at the medial and lateral aspects, achy and throbbing and sometimes sharp and shooting, worse with bearing weight and ambulating, associated with swelling, and improved with resting.  She has been managing symptoms with resting, icing, and taking over-the-counter medications.  She is status post right knee ACL surgery more than 10 years ago.  She was seen by primary care physician and referred to orthopedic care.    Knee Pain  This is a new problem. The current episode started more than 1 month ago. The problem occurs daily. The problem has been gradually worsening. Associated symptoms include arthralgias and joint swelling. Pertinent negatives include no numbness or weakness. The symptoms are aggravated by standing, walking and bending. She has tried rest, position changes, NSAIDs, ice and acetaminophen for the symptoms. The treatment provided mild relief.               Review of Systems   Musculoskeletal:  Positive for arthralgias and joint swelling.   Neurological:  Negative for weakness and numbness.       Objective:  Vitals:    07/11/24 1107   BP: 117/84   Pulse: 67   Weight: 56.3 kg (124 lb 3.2 oz)   Height: 5' 10\" (1.778 m)      Right Knee Exam     Muscle Strength   The patient has normal right knee strength.    Tenderness   Right knee tenderness location: Patella facet.    Range of Motion   Extension:  normal   Flexion:  130     Tests   Varus: negative Valgus: negative  Lachman:  Anterior - negative        Other   Swelling: mild    Comments:  Positive patella inhibition and grind            Physical Exam  Vitals and nursing note reviewed.   Constitutional:       Appearance: Normal " appearance. She is well-developed. She is not ill-appearing or diaphoretic.   HENT:      Head: Normocephalic and atraumatic.      Right Ear: External ear normal.      Left Ear: External ear normal.   Eyes:      Conjunctiva/sclera: Conjunctivae normal.   Neck:      Trachea: No tracheal deviation.   Cardiovascular:      Rate and Rhythm: Normal rate.   Pulmonary:      Effort: Pulmonary effort is normal. No respiratory distress.   Abdominal:      General: There is no distension.   Musculoskeletal:         General: Tenderness present.   Skin:     General: Skin is warm and dry.      Coloration: Skin is not jaundiced or pale.   Neurological:      Mental Status: She is alert and oriented to person, place, and time.   Psychiatric:         Mood and Affect: Mood normal.         Behavior: Behavior normal.         Thought Content: Thought content normal.         Judgment: Judgment normal.         I have personally reviewed pertinent films in PACS and my interpretation is  .  X-rays right knee noted for status post ACL surgery with degenerative changes.

## 2024-07-11 NOTE — TELEPHONE ENCOUNTER
Caller: Patient    Doctor: Dr. Loyd    Reason for call: Patient calling stating that she was prescribed Voltaren by Dr. Loyd and she has taken that in the past and it doesn't seem to help.  She is wondering if an alternative can be sent to her pharmacy?    [id number] - [place] - [address] [place] [address]  [phone]       Call back#: [phone]

## 2024-07-12 DIAGNOSIS — F41.1 GAD (GENERALIZED ANXIETY DISORDER): ICD-10-CM

## 2024-07-12 DIAGNOSIS — M17.11 PRIMARY OSTEOARTHRITIS OF RIGHT KNEE: Primary | ICD-10-CM

## 2024-07-12 RX ORDER — CLONAZEPAM 2 MG/1
2 TABLET ORAL 2 TIMES DAILY
Qty: 60 TABLET | Refills: 0 | Status: SHIPPED | OUTPATIENT
Start: 2024-07-15 | End: 2024-08-14

## 2024-07-12 RX ORDER — MELOXICAM 15 MG/1
15 TABLET ORAL DAILY
Qty: 30 TABLET | Refills: 1 | Status: SHIPPED | OUTPATIENT
Start: 2024-07-12

## 2024-07-12 NOTE — TELEPHONE ENCOUNTER
Reason for call:   [x] Refill   [] Prior Auth  [] Other:     Office:   [x] PCP/Provider - : Diane Lorenzo DO /[facility]   [] Specialty/Provider -     Medication:      clonazePAM (KlonoPIN) 2 mg tablet     Dose/Frequency:  Take 1 tablet (2 mg total) by mouth 2 (two) times a day     Quantity: 60    Pharmacy: [id number] - [place], [place] - [facility] [address]      Does the patient have enough for 3 days?   [] Yes   [x] No - Send as HP to POD

## 2024-07-12 NOTE — TELEPHONE ENCOUNTER
Caller: Patient    Doctor: Dr. Loyd    Reason for call: Patient f/u on her request for a substitute for her Voltaren for her pain.  It can be sent to pharmacy below.    [id number] - [place] - [address] [phone]     Call back#: [phone]

## 2024-07-15 ENCOUNTER — OFFICE VISIT (OUTPATIENT)
Age: 39
End: 2024-07-15
Payer: COMMERCIAL

## 2024-07-15 VITALS
TEMPERATURE: 97.9 F | HEIGHT: 70 IN | OXYGEN SATURATION: 97 % | WEIGHT: 126 LBS | BODY MASS INDEX: 18.04 KG/M2 | RESPIRATION RATE: 16 BRPM | DIASTOLIC BLOOD PRESSURE: 56 MMHG | HEART RATE: 85 BPM | SYSTOLIC BLOOD PRESSURE: 108 MMHG

## 2024-07-15 DIAGNOSIS — F17.210 CIGARETTE NICOTINE DEPENDENCE WITHOUT COMPLICATION: ICD-10-CM

## 2024-07-15 DIAGNOSIS — M25.561 CHRONIC PAIN OF RIGHT KNEE: Primary | ICD-10-CM

## 2024-07-15 DIAGNOSIS — K08.9 CHRONIC DENTAL PAIN: ICD-10-CM

## 2024-07-15 DIAGNOSIS — G89.29 CHRONIC DENTAL PAIN: ICD-10-CM

## 2024-07-15 DIAGNOSIS — G89.29 CHRONIC PAIN OF RIGHT KNEE: Primary | ICD-10-CM

## 2024-07-15 PROCEDURE — 99214 OFFICE O/P EST MOD 30 MIN: CPT | Performed by: FAMILY MEDICINE

## 2024-07-15 RX ORDER — VARENICLINE TARTRATE 0.5 (11)-1
KIT ORAL
Qty: 53 EACH | Refills: 0 | Status: SHIPPED | OUTPATIENT
Start: 2024-07-15

## 2024-07-15 RX ORDER — ACETAMINOPHEN AND CODEINE PHOSPHATE 300; 30 MG/1; MG/1
1 TABLET ORAL 2 TIMES DAILY PRN
Qty: 15 TABLET | Refills: 0 | Status: SHIPPED | OUTPATIENT
Start: 2024-07-15

## 2024-07-15 NOTE — PROGRESS NOTES
"Ambulatory Visit  Name: Yoly Castro      : 1985      MRN: [id number]  Encounter Provider: Diane Lorenzo DO  Encounter Date: 7/15/2024   Encounter department: [facility] [place]    Assessment & Plan   1. Chronic pain of right knee-pt using T3 for pain control. Will be trailing visco injection soon.   2. Cigarette nicotine dependence without complication  -     Varenicline Tartrate, Starter, (Chantix Starting Month ) 0.5 MG X 11 & 1 MG X 42 TBPK; 0.5 mg once daily for 3 days then 0.5mg twice daily for days 4-7 then 1 mg twice daily       History of Present Illness     Pt presents for f/u concerning knee pain. Has seen orthopedics. Recommendation quit smoking. Would like to try medication.         Review of Systems   Musculoskeletal:  Positive for arthralgias.       Objective     /56 (BP Location: Right arm, Patient Position: Sitting, Cuff Size: Standard)   Pulse 85   Temp 97.9 °F (36.6 °C) (Tympanic)   Resp 16   Ht 5' 10\" (1.778 m)   Wt 57.2 kg (126 lb)   SpO2 97%   BMI 18.08 kg/m²     Physical Exam  HENT:      Head: Normocephalic.   Cardiovascular:      Rate and Rhythm: Normal rate.   Pulmonary:      Effort: Pulmonary effort is normal.   Neurological:      Mental Status: She is alert and oriented to person, place, and time.   Psychiatric:         Mood and Affect: Mood normal.         Behavior: Behavior normal.       Administrative Statements           "

## 2024-07-16 ENCOUNTER — TELEPHONE (OUTPATIENT)
Dept: OBGYN CLINIC | Facility: MEDICAL CENTER | Age: 39
End: 2024-07-16

## 2024-07-16 NOTE — TELEPHONE ENCOUNTER
Patient reports that she is struggling with the pain and has tried all options. She reports that she has tried and failed home exercises learned from attending  physical therapy about one year ago, topical creams, ice and heat. She states that she does not know what else to do. She is not getting pain relief.

## 2024-08-09 ENCOUNTER — PROCEDURE VISIT (OUTPATIENT)
Dept: OBGYN CLINIC | Facility: CLINIC | Age: 39
End: 2024-08-09
Payer: COMMERCIAL

## 2024-08-09 VITALS
HEIGHT: 70 IN | BODY MASS INDEX: 16.83 KG/M2 | HEART RATE: 99 BPM | WEIGHT: 117.6 LBS | DIASTOLIC BLOOD PRESSURE: 84 MMHG | SYSTOLIC BLOOD PRESSURE: 120 MMHG

## 2024-08-09 DIAGNOSIS — M17.11 PRIMARY OSTEOARTHRITIS OF RIGHT KNEE: Primary | ICD-10-CM

## 2024-08-09 DIAGNOSIS — M22.2X1 PATELLOFEMORAL SYNDROME OF BOTH KNEES: ICD-10-CM

## 2024-08-09 DIAGNOSIS — M22.2X2 PATELLOFEMORAL SYNDROME OF BOTH KNEES: ICD-10-CM

## 2024-08-09 DIAGNOSIS — R29.898 WEAKNESS OF BOTH HIPS: ICD-10-CM

## 2024-08-09 DIAGNOSIS — M62.81 WEAKNESS OF BOTH QUADRICEPS MUSCLES: ICD-10-CM

## 2024-08-09 PROCEDURE — 20610 DRAIN/INJ JOINT/BURSA W/O US: CPT | Performed by: FAMILY MEDICINE

## 2024-08-09 RX ORDER — BUPIVACAINE HYDROCHLORIDE 2.5 MG/ML
2 INJECTION, SOLUTION INFILTRATION; PERINEURAL
Status: COMPLETED | OUTPATIENT
Start: 2024-08-09 | End: 2024-08-09

## 2024-08-09 RX ORDER — BUPIVACAINE HYDROCHLORIDE 2.5 MG/ML
1 INJECTION, SOLUTION INFILTRATION; PERINEURAL
Status: COMPLETED | OUTPATIENT
Start: 2024-08-09 | End: 2024-08-09

## 2024-08-09 RX ADMIN — BUPIVACAINE HYDROCHLORIDE 2 ML: 2.5 INJECTION, SOLUTION INFILTRATION; PERINEURAL at 11:00

## 2024-08-09 RX ADMIN — BUPIVACAINE HYDROCHLORIDE 1 ML: 2.5 INJECTION, SOLUTION INFILTRATION; PERINEURAL at 11:00

## 2024-08-09 NOTE — PROGRESS NOTES
Assessment/Plan:  Assessment & Plan   Diagnoses and all orders for this visit:    Primary osteoarthritis of right knee  -     Large joint arthrocentesis: R knee  -     Ambulatory Referral to Physical Therapy; Future    Patellofemoral syndrome of both knees  -     Ambulatory Referral to Physical Therapy; Future    Weakness of both hips  -     Ambulatory Referral to Physical Therapy; Future    Weakness of both quadriceps muscles  -     Ambulatory Referral to Physical Therapy; Future        38-year-old female with osteoarthritis of the right knee with right knee pain more than 4 months duration.  Clinical impression is that she has symptoms from degenerative changes.  She agreed to proceed with Visco injection.  I administered Durolane the right knee without complication.  She was advised to allow 3 to 4 weeks for full effects of visco supplement.  If visco injection is to be repeated we must wait at least 6 months.  She will follow-up as needed.        Subjective:   Patient ID: Yoly Castro is a 38 y.o. female.  Chief Complaint   Patient presents with    Right Knee - Follow-up     Visco Injection        38-year-old female with osteoarthritis of the right knee following up for right knee pain more than 4 months duration.  She was last seen by me 4 weeks ago at which point she was prescribed diclofenac 75 mg twice daily and we requested for visco injection.  She presents today for visco injection of the right knee.    Knee Pain  This is a new problem. The current episode started more than 1 month ago. The problem occurs daily. The problem has been unchanged. Associated symptoms include arthralgias. Pertinent negatives include no joint swelling, numbness or weakness. The symptoms are aggravated by standing, walking and bending. She has tried rest, position changes and NSAIDs for the symptoms. The treatment provided mild relief.               Review of Systems   Musculoskeletal:  Positive for arthralgias. Negative for joint  "swelling.   Neurological:  Negative for weakness and numbness.       Objective:  Vitals:    08/09/24 1127   BP: 120/84   Pulse: 99   Weight: 53.3 kg (117 lb 9.6 oz)   Height: 5' 10\" (1.778 m)      Ortho Exam    Physical Exam  Vitals and nursing note reviewed.   Constitutional:       Appearance: Normal appearance. She is well-developed. She is not ill-appearing or diaphoretic.   HENT:      Head: Normocephalic and atraumatic.      Right Ear: External ear normal.      Left Ear: External ear normal.   Eyes:      Conjunctiva/sclera: Conjunctivae normal.   Neck:      Trachea: No tracheal deviation.   Cardiovascular:      Rate and Rhythm: Normal rate.   Pulmonary:      Effort: Pulmonary effort is normal. No respiratory distress.   Abdominal:      General: There is no distension.   Skin:     General: Skin is warm and dry.      Coloration: Skin is not jaundiced or pale.   Neurological:      Mental Status: She is alert and oriented to person, place, and time.   Psychiatric:         Mood and Affect: Mood normal.         Behavior: Behavior normal.         Thought Content: Thought content normal.         Judgment: Judgment normal.               Large joint arthrocentesis: R knee  Universal Protocol:  Consent: Verbal consent obtained.  Risks and benefits: risks, benefits and alternatives were discussed  Consent given by: patient  Time out: Immediately prior to procedure a \"time out\" was called to verify the correct patient, procedure, equipment, support staff and site/side marked as required.  Patient understanding: patient states understanding of the procedure being performed  Patient consent: the patient's understanding of the procedure matches consent given  Procedure consent: procedure consent matches procedure scheduled  Relevant documents: relevant documents present and verified  Test results: test results available and properly labeled  Site marked: the operative site was marked  Radiology Images displayed and confirmed. " "If images not available, report reviewed: imaging studies available  Required items: required blood products, implants, devices, and special equipment available  Patient identity confirmed: verbally with patient  Supporting Documentation  Indications: pain   Procedure Details  Location: knee - R knee  Preparation: Patient was prepped and draped in the usual sterile fashion  Needle gauge: 21G 2\"  Ultrasound guidance: no  Approach: lateral  Medications administered: 2 mL bupivacaine 0.25 %; 1 mL bupivacaine 0.25 %; 3 mL sodium hyaluronate 60 MG/3ML    Patient tolerance: patient tolerated the procedure well with no immediate complications  Dressing:  Sterile dressing applied            "

## 2024-08-12 DIAGNOSIS — F41.1 GAD (GENERALIZED ANXIETY DISORDER): ICD-10-CM

## 2024-08-12 RX ORDER — CLONAZEPAM 2 MG/1
2 TABLET ORAL 2 TIMES DAILY
Qty: 60 TABLET | Refills: 0 | Status: SHIPPED | OUTPATIENT
Start: 2024-08-12 | End: 2024-09-11

## 2024-08-15 RX ORDER — CLONAZEPAM 2 MG/1
2 TABLET ORAL 2 TIMES DAILY
Qty: 60 TABLET | Refills: 0 | OUTPATIENT
Start: 2024-08-15 | End: 2024-09-14

## 2024-08-22 ENCOUNTER — TELEPHONE (OUTPATIENT)
Dept: OBGYN CLINIC | Facility: HOSPITAL | Age: 39
End: 2024-08-22

## 2024-08-22 NOTE — TELEPHONE ENCOUNTER
Called and spoke with pt. She had a durolane injection 8/9/24. She was feeling better but today she had a one time pain that felt like a knife went into her knee, no swelling/redness and the pain went away.  She states its the same pain she had prior to the injection. She wanted to make Dr Loyd aware because she is coming in to get the other knee done and she is not sure if she should even do that if the pain returned that fast. Her next appt is 9/5/24. Please review and advise. Thanks!

## 2024-08-22 NOTE — TELEPHONE ENCOUNTER
Caller: Patient    Doctor: Evert    Reason for call: Patient had Durolane injection in her knee on 8/9/24 and today she had a stabbing pain in her knee. She's not sure if this is normal. Please call patient. Thank you.    Call back#: [phone]

## 2024-08-28 NOTE — TELEPHONE ENCOUNTER
Called and spoke with pt and relayed Dr Loyd message. She states that she hasn't had any pain since then so she does plan to proceed with the next injection, she called PT and is waiting for her initial appt. No further questions

## 2024-08-30 NOTE — TELEPHONE ENCOUNTER
Caller: Patient     Doctor: Evert     Reason for call: Patient is having a lot more pain and feels as if the injection did not benefit her, just an FYI     Call back#: [phone]

## 2024-09-04 DIAGNOSIS — M17.11 PRIMARY OSTEOARTHRITIS OF RIGHT KNEE: Primary | ICD-10-CM

## 2024-09-04 RX ORDER — CELECOXIB 100 MG/1
100 CAPSULE ORAL 2 TIMES DAILY
Qty: 60 CAPSULE | Refills: 1 | Status: SHIPPED | OUTPATIENT
Start: 2024-09-04

## 2024-09-05 ENCOUNTER — OFFICE VISIT (OUTPATIENT)
Dept: OBGYN CLINIC | Facility: CLINIC | Age: 39
End: 2024-09-05
Payer: COMMERCIAL

## 2024-09-05 ENCOUNTER — APPOINTMENT (OUTPATIENT)
Dept: RADIOLOGY | Facility: CLINIC | Age: 39
End: 2024-09-05
Payer: COMMERCIAL

## 2024-09-05 VITALS
WEIGHT: 122 LBS | SYSTOLIC BLOOD PRESSURE: 132 MMHG | BODY MASS INDEX: 17.47 KG/M2 | OXYGEN SATURATION: 98 % | HEIGHT: 70 IN | DIASTOLIC BLOOD PRESSURE: 86 MMHG | HEART RATE: 87 BPM

## 2024-09-05 DIAGNOSIS — M25.562 LEFT KNEE PAIN, UNSPECIFIED CHRONICITY: ICD-10-CM

## 2024-09-05 DIAGNOSIS — M17.0 PRIMARY OSTEOARTHRITIS OF BOTH KNEES: Primary | ICD-10-CM

## 2024-09-05 DIAGNOSIS — M17.12 PRIMARY OSTEOARTHRITIS OF LEFT KNEE: ICD-10-CM

## 2024-09-05 PROCEDURE — 99213 OFFICE O/P EST LOW 20 MIN: CPT | Performed by: FAMILY MEDICINE

## 2024-09-05 PROCEDURE — 73562 X-RAY EXAM OF KNEE 3: CPT

## 2024-09-05 RX ORDER — IBUPROFEN 800 MG/1
800 TABLET, FILM COATED ORAL EVERY 8 HOURS PRN
COMMUNITY
Start: 2024-09-03

## 2024-09-05 NOTE — PROGRESS NOTES
Assessment/Plan:  Assessment & Plan   Diagnoses and all orders for this visit:    Primary osteoarthritis of both knees  -     Ambulatory Referral to Orthopedic Surgery; Future  -     Ambulatory referral to Spine & Pain Management; Future    Left knee pain, unspecified chronicity    Primary osteoarthritis of left knee  -     XR knee 3 vw left non injury; Future    Other orders  -     ibuprofen (MOTRIN) 800 mg tablet; Take 800 mg by mouth every 8 (eight) hours as needed        38-year-old female with pain in both knees, right worse than left, more than few years duration.  Discussed with patient physical exam, imaging studies, impression, and plan.  X-rays both knees noted for tunneling surgical changes due to ACL surgery.  There are degenerative change of both knees more pronounced at the patellofemoral spaces.  Imaging studies, clinical exam, and history suggest that symptoms are due to abnormal patellofemoral mechanics and degenerative changes.  She did not respond to injections and has failed on trial of multiple NSAIDs including meloxicam, ibuprofen, and diclofenac.  It was emphasized to patient that quad muscle weakening is huge factor in her symptoms and aggressive formal therapy and strengthening is warranted.  Patient would like to discuss surgical options as she feels she may benefit from knee replacement.  She was previously seen by Dr. Andujar who also recommended formal therapy and was given injection.  Advised patient she may consider opinion from another surgeon.  She will follow-up with me as needed.        Subjective:   Patient ID: Yoly Castro is a 38 y.o. female.  Chief Complaint   Patient presents with    Left Knee - Pain    Right Knee - Follow-up        38-year-old female following up for pain both knees, right worse than left, more than few years duration.  She was last seen by me 4 weeks ago at which point she underwent right knee visco injection.  Following visco injection she had improved  "symptoms lasting only for few days.  She reports worsening of symptoms since then.  She has been experiencing pain described as generalized to the knee but worse to the anterior peripatellar aspect, aching throbbing and burning and sometimes sharp, worse with activity and improved with resting.  She has been on ibuprofen 800 mg which provides mild improvement in symptoms.  She has previously tried Celebrex, meloxicam , Tylenol with codeine, and diclofenac without improvement.      Knee Pain  This is a chronic problem. The current episode started more than 1 year ago. The problem occurs daily. The problem has been gradually worsening. Associated symptoms include arthralgias and joint swelling. Pertinent negatives include no numbness or weakness. The symptoms are aggravated by standing, walking and bending. She has tried rest, position changes and NSAIDs (Visco injection, physical therapy, home exercise) for the symptoms. The treatment provided no relief.               Review of Systems   Musculoskeletal:  Positive for arthralgias and joint swelling.   Neurological:  Negative for weakness and numbness.       Objective:  Vitals:    09/05/24 1109   BP: 132/86   Pulse: 87   SpO2: 98%   Weight: 55.3 kg (122 lb)   Height: 5' 10\" (1.778 m)      Right Knee Exam     Range of Motion   Extension:  normal   Flexion:  130     Tests   Varus: negative Valgus: negative      Left Knee Exam     Range of Motion   Extension:  normal   Flexion:  130     Tests   Varus: negative Valgus: negative            Physical Exam  Vitals and nursing note reviewed.   Constitutional:       Appearance: Normal appearance. She is well-developed. She is not ill-appearing or diaphoretic.   HENT:      Head: Normocephalic and atraumatic.      Right Ear: External ear normal.      Left Ear: External ear normal.   Eyes:      Conjunctiva/sclera: Conjunctivae normal.   Neck:      Trachea: No tracheal deviation.   Cardiovascular:      Rate and Rhythm: Normal rate. "   Pulmonary:      Effort: Pulmonary effort is normal. No respiratory distress.   Abdominal:      General: There is no distension.   Skin:     General: Skin is warm and dry.      Coloration: Skin is not jaundiced or pale.   Neurological:      Mental Status: She is alert and oriented to person, place, and time.   Psychiatric:         Mood and Affect: Mood normal.         Behavior: Behavior normal.         Thought Content: Thought content normal.         Judgment: Judgment normal.         I have personally reviewed pertinent films in PACS and my interpretation is  .  X-rays both knees noted for tunneling surgical changes due to ACL surgery.  There are degenerative change of both knees more pronounced at the patellofemoral spaces.

## 2024-09-10 DIAGNOSIS — F41.1 GAD (GENERALIZED ANXIETY DISORDER): ICD-10-CM

## 2024-09-10 RX ORDER — CLONAZEPAM 2 MG/1
2 TABLET ORAL 2 TIMES DAILY
Qty: 60 TABLET | Refills: 0 | Status: SHIPPED | OUTPATIENT
Start: 2024-09-10 | End: 2024-10-10

## 2024-09-10 NOTE — TELEPHONE ENCOUNTER
Reason for call:   [x] Refill   [] Prior Auth  [] Other:     Office:   [x] PCP/Provider -   [] Specialty/Provider -     Medication: clonazePAM (KlonoPIN) 2 mg tablet Take 1 tablet (2 mg total) by mouth 2 (two) times a day       Pharmacy: [facility] #[id number] - [place] - [address]      Does the patient have enough for 3 days?   [x] Yes   [] No - Send as HP to POD     Felix Arreola

## 2024-09-27 ENCOUNTER — OFFICE VISIT (OUTPATIENT)
Dept: OBGYN CLINIC | Facility: MEDICAL CENTER | Age: 39
End: 2024-09-27
Payer: COMMERCIAL

## 2024-09-27 VITALS
HEART RATE: 81 BPM | BODY MASS INDEX: 17.47 KG/M2 | WEIGHT: 122 LBS | DIASTOLIC BLOOD PRESSURE: 87 MMHG | SYSTOLIC BLOOD PRESSURE: 119 MMHG | HEIGHT: 70 IN

## 2024-09-27 DIAGNOSIS — M25.561 CHRONIC PAIN OF RIGHT KNEE: ICD-10-CM

## 2024-09-27 DIAGNOSIS — M17.11 PRIMARY OSTEOARTHRITIS OF RIGHT KNEE: Primary | ICD-10-CM

## 2024-09-27 DIAGNOSIS — G89.29 CHRONIC PAIN OF RIGHT KNEE: ICD-10-CM

## 2024-09-27 DIAGNOSIS — G89.29 CHRONIC PAIN OF LEFT KNEE: ICD-10-CM

## 2024-09-27 DIAGNOSIS — Z47.1 AFTERCARE FOLLOWING RIGHT KNEE JOINT REPLACEMENT SURGERY: ICD-10-CM

## 2024-09-27 DIAGNOSIS — M17.12 PRIMARY OSTEOARTHRITIS OF LEFT KNEE: ICD-10-CM

## 2024-09-27 DIAGNOSIS — Z01.812 PRE-OPERATIVE LABORATORY EXAMINATION: ICD-10-CM

## 2024-09-27 DIAGNOSIS — Z98.890 HX OF KNEE SURGERY: ICD-10-CM

## 2024-09-27 DIAGNOSIS — Z01.810 PRE-OPERATIVE CARDIOVASCULAR EXAMINATION: ICD-10-CM

## 2024-09-27 DIAGNOSIS — M25.562 CHRONIC PAIN OF LEFT KNEE: ICD-10-CM

## 2024-09-27 DIAGNOSIS — Z96.651 AFTERCARE FOLLOWING RIGHT KNEE JOINT REPLACEMENT SURGERY: ICD-10-CM

## 2024-09-27 PROCEDURE — 99204 OFFICE O/P NEW MOD 45 MIN: CPT | Performed by: ORTHOPAEDIC SURGERY

## 2024-09-27 RX ORDER — TRANEXAMIC ACID 10 MG/ML
1000 INJECTION, SOLUTION INTRAVENOUS ONCE
OUTPATIENT
Start: 2024-09-27 | End: 2024-09-27

## 2024-09-27 RX ORDER — ENOXAPARIN SODIUM 100 MG/ML
40 INJECTION SUBCUTANEOUS DAILY
Qty: 11.2 ML | Refills: 0 | Status: SHIPPED | OUTPATIENT
Start: 2024-09-27 | End: 2024-10-25

## 2024-09-27 RX ORDER — CHLORHEXIDINE GLUCONATE ORAL RINSE 1.2 MG/ML
15 SOLUTION DENTAL ONCE
OUTPATIENT
Start: 2024-09-27 | End: 2024-09-27

## 2024-09-27 RX ORDER — CEFAZOLIN SODIUM 2 G/50ML
2000 SOLUTION INTRAVENOUS ONCE
OUTPATIENT
Start: 2024-09-27 | End: 2024-09-27

## 2024-09-27 RX ORDER — FERROUS SULFATE 324(65)MG
TABLET, DELAYED RELEASE (ENTERIC COATED) ORAL
Qty: 30 TABLET | Refills: 0 | Status: SHIPPED | OUTPATIENT
Start: 2024-09-27

## 2024-09-27 RX ORDER — ASCORBIC ACID 500 MG
500 TABLET ORAL 2 TIMES DAILY
Qty: 60 TABLET | Refills: 0 | Status: SHIPPED | OUTPATIENT
Start: 2024-09-27

## 2024-09-27 RX ORDER — FOLIC ACID 1 MG/1
1 TABLET ORAL DAILY
Qty: 30 TABLET | Refills: 0 | Status: SHIPPED | OUTPATIENT
Start: 2024-09-27

## 2024-09-27 NOTE — PROGRESS NOTES
Assessment:   Diagnosis ICD-10-CM Associated Orders   1. Primary osteoarthritis of right knee  M17.11 Ambulatory Referral to Orthopedic Surgery     Diet NPO; Sips with meds     Nursing Communication Warmimg Interventions Implemented     Nursing Communication CHG bath, have staff wash entire body (neck down) per pre-op bathing protocol. Routine, evening prior to, and day of surgery.     Nursing Communication Swab both nares with Povidone-Iodine solution, EXCLUDE if patient has shellfish/Iodine allergy, and replace with nasal alcohol swabstick. Routine, day of surgery, on call to OR     Void on call to OR     Insert peripheral IV     Ambulatory referral to [facility]     Ambulatory referral to Physical Therapy     Place sequential compression device     Case request operating room: Robotically assisted right total knee arthroplasty and all associated procedures as indicated     Case request operating room: Robotically assisted right total knee arthroplasty and all associated procedures as indicated     Ambulatory Referral to Physical Therapy      2. Chronic pain of right knee  M25.561 Diet NPO; Sips with meds    G89.29 Nursing Communication Warmimg Interventions Implemented     Nursing Communication CHG bath, have staff wash entire body (neck down) per pre-op bathing protocol. Routine, evening prior to, and day of surgery.     Nursing Communication Swab both nares with Povidone-Iodine solution, EXCLUDE if patient has shellfish/Iodine allergy, and replace with nasal alcohol swabstick. Routine, day of surgery, on call to OR     Void on call to OR     Insert peripheral IV     Ambulatory referral to [facility]     Ambulatory referral to Physical Therapy     Place sequential compression device     Case request operating room: Robotically assisted right total knee arthroplasty and all associated procedures as indicated     Case request operating room: Robotically assisted right total knee arthroplasty and all  associated procedures as indicated     Ambulatory Referral to Physical Therapy      3. Hx of knee surgery  Z98.890 Ambulatory Referral to Physical Therapy    ACL reconstruction of both knees      4. Pre-operative laboratory examination  Z01.812 Comprehensive metabolic panel     Hemoglobin A1C W/EAG Estimation     CBC and differential     Anemia Panel w/Reflex     Protime-INR     APTT     Type and screen      5. Pre-operative cardiovascular examination  Z01.810 EKG 12 lead      6. Aftercare following right knee joint replacement surgery  Z47.1 Ambulatory referral to Family Practice    Z96.651       7. Primary osteoarthritis of left knee  M17.12 Ambulatory Referral to Physical Therapy      8. Chronic pain of left knee  M25.562 Ambulatory Referral to Physical Therapy    G89.29           Plan:  Recently taken diagnostics reviewed and physical exam performed.  Diagnosis, treatment options and associated risks were discussed with the patient including no treatment, nonsurgical treatment and potential for surgical intervention.  The patient was given the opportunity to ask questions regarding each.  Educated the importance of smoking cessation as general wellness measures.  Quality of life decision to pursue elective total knee arthroplasty.  Risks and benefits of a robotically assisted right total knee arthroplasty were discussed.  Consents obtained.  Preoperative vitamins and postoperative Lovenox sent to her pharmacy of record.  Referral placed to pre-op PT at her request.    To do next visit:  Return for post-op with x-rays upon arrival right knee.    The above stated was discussed in layman's terms and the patient expressed understanding.  All questions were answered to the patient's satisfaction.       Scribe Attestation      I,:  Gage Astudillo am acting as a scribe while in the presence of the attending physician.:       I,:  Rivera Martin MD personally performed the services described in this documentation    " as scribed in my presence.:               Subjective:   Yoly Castro is a 38 y.o. female who presents today with her friend for initial evaluation of her bilateral knees due to pain.  She has right greater than left knee pain.  She has a history of ACL reconstruction of both knees done several years ago. She states that she has had several surgeries on both knees. She reports that she had to have removal of retained hardware due to an \"allergic reaction\" to the hardware. She has had injections of Toradol as well as viscosupplementation without any appreciable relief.  She has tried various oral medications in the form of anti-inflammatories as well as over-the-counter Tylenol without improvement long-term of her knee symptoms.  She has increased knee pain with weightbearing activities.  She is stiffness getting up with the prolonged sedentary positions.  She does not work.  She states that she is in the process of quitting smoking and smokes approximately one half or less pack per day.  She also has smokeless tobacco that she uses. She has started on nicotine patches.  She has difficulty ambulating stairs especially in an alternating fashion.  She has a young child (2.4yo son) that has special needs and has difficulty keeping up with them.    She has had 2 brain surgeries in which she states she did not stop smoking for.     She states that her dentist gave her a few tablets of Tylenol #3 for a tooth ache but plain Tylenol upsets her stomach.       Review of systems negative unless otherwise specified in HPI  Review of Systems    Past Medical History:   Diagnosis Date    Anxiety     Asthma     Bipolar disorder (HCC)     Closed nondisplaced fracture of phalanx of left middle finger with routine healing 08/09/2022    Depression     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 03/25/2021    OCD (obsessive compulsive disorder)     Stroke ([facility])        Past Surgical History:   Procedure Laterality Date "    BRAIN SURGERY      KNEE SURGERY         History reviewed. No pertinent family history.    Social History     Occupational History    Not on file   Tobacco Use    Smoking status: Every Day     Current packs/day: 1.00     Average packs/day: 1 pack/day for 25.0 years (25.0 ttl pk-yrs)     Types: Cigarettes    Smokeless tobacco: Never   Vaping Use    Vaping status: Never Used   Substance and Sexual Activity    Alcohol use: Never    Drug use: Never     Types: Marijuana    Sexual activity: Not Currently     Partners: Male     Birth control/protection: None         Current Outpatient Medications:     albuterol (Ventolin HFA) 90 mcg/act inhaler, Inhale 2 puffs every 6 (six) hours as needed for wheezing, Disp: 18 g, Rfl: 5    clonazePAM (KlonoPIN) 2 mg tablet, Take 1 tablet (2 mg total) by mouth 2 (two) times a day, Disp: 60 tablet, Rfl: 0    ibuprofen (MOTRIN) 800 mg tablet, Take 800 mg by mouth every 8 (eight) hours as needed, Disp: , Rfl:     levothyroxine (Euthyrox) 50 mcg tablet, Take 1 tablet (50 mcg total) by mouth daily in the early morning, Disp: 30 tablet, Rfl: 5    naloxone (NARCAN) 4 mg/0.1 mL nasal spray, Administer 1 spray into a nostril. If no response after 2-3 minutes, give another dose in the other nostril using a new spray., Disp: 1 each, Rfl: 1    Varenicline Tartrate, Starter, (Chantix Starting Month Pak) 0.5 MG X 11 & 1 MG X 42 TBPK, 0.5 mg once daily for 3 days then 0.5mg twice daily for days 4-7 then 1 mg twice daily, Disp: 53 each, Rfl: 0    acetaminophen-codeine (TYLENOL/CODEINE #3) 300-30 MG per tablet, Take 1 tablet by mouth 2 (two) times a day as needed for moderate pain (Patient not taking: Reported on 9/27/2024), Disp: 15 tablet, Rfl: 0    celecoxib (CeleBREX) 100 mg capsule, Take 1 capsule (100 mg total) by mouth 2 (two) times a day (Patient not taking: Reported on 9/5/2024), Disp: 60 capsule, Rfl: 1    lidocaine (LIDODERM) 5 %, Place 3 patches on the skin daily as needed (Patient not  "taking: Reported on 9/5/2024), Disp: , Rfl:     Allergies   Allergen Reactions    Cortisone Rash and Swelling    Fluoxetine Confusion and Other (See Comments)     Patient states she went \"crazy, nuts.\" Unable to specify more clearly.    Pregabalin Rash and Swelling    Fentanyl Hives     Fever and migraine      Nitrofurantoin Swelling    Acetaminophen Rash    Hydrocodone-Acetaminophen Rash            Vitals:    09/27/24 1342   BP: 119/87   Pulse: 81       Body mass index is 17.51 kg/m².  Wt Readings from Last 3 Encounters:   09/27/24 55.3 kg (122 lb)   09/05/24 55.3 kg (122 lb)   08/09/24 53.3 kg (117 lb 9.6 oz)       Objective:                    Right Knee Exam     Muscle Strength   The patient has normal right knee strength.    Tenderness   The patient is experiencing tenderness in the medial joint line and lateral joint line.    Range of Motion   Extension:  0   Flexion:  130 (with crepitation and stiffness with flexion)     Other   Erythema: absent  Sensation: normal  Swelling: mild  Effusion: no effusion present      Left Knee Exam     Muscle Strength   The patient has normal left knee strength.    Tenderness   The patient is experiencing tenderness in the medial joint line.    Range of Motion   Extension:  0   Flexion:  130 (with crepitation and stiffness with flexion)     Other   Erythema: absent  Sensation: normal  Swelling: mild  Effusion: no effusion present    Comments:    Varus alignment with bony enlargement medially    Both knees have several well healed incisions and portal sites.    Both knees have bony protuberances anterior tibial tubercle.             Diagnostics, reviewed and taken today if performed as documented:    None performed but reviewed:     The attending physician has personally reviewed the pertinent films in PACS and interpretation is as follows:    Left knee x-rays taken 9/5/2024 and right knee x-rays taken 7/11/2024 were reviewed today and show: Tricompartmental degenerative changes " "with bone-on-bone opposition patellofemoral compartments.  Moderate-severe medial and lateral joint space narrowing.  Subchondral sclerosis osteophyte formation present at both knees.  Visible tunnels distal femur proximal tibia consistent with previous ACL reconstructions.  Retained hardware left distal femur insistent with previous ACL reconstruction      Procedures, if performed today:    Procedures    None performed      Portions of the record may have been created with voice recognition software.  Occasional wrong word or \"sound a like\" substitutions may have occurred due to the inherent limitations of voice recognition software.  Read the chart carefully and recognize, using context, where substitutions have occurred.  "

## 2024-10-01 ENCOUNTER — TELEPHONE (OUTPATIENT)
Age: 39
End: 2024-10-01

## 2024-10-01 DIAGNOSIS — F17.210 CIGARETTE NICOTINE DEPENDENCE WITHOUT COMPLICATION: Primary | ICD-10-CM

## 2024-10-01 RX ORDER — VARENICLINE TARTRATE 1 MG/1
1 TABLET, FILM COATED ORAL 2 TIMES DAILY
Qty: 60 TABLET | Refills: 1 | Status: SHIPPED | OUTPATIENT
Start: 2024-10-01

## 2024-10-01 NOTE — TELEPHONE ENCOUNTER
Pt states that she needs the Chantix pack to help stop smoking before surgery. Pt states that she finished her starter pack.

## 2024-10-01 NOTE — TELEPHONE ENCOUNTER
Caller: patient    Doctor: ras    Reason for call: patient would like to know if she is able to use on! Nicotine pouches     Call back#: [phone]

## 2024-10-02 NOTE — TELEPHONE ENCOUNTER
Caller: Patient     Doctor: Mario    Reason for call: Patient stated she is very upset and frustrated. Stated she has called the surgery scheduler 3 times to speak with someone regarding the nicotine patch and she has not heard back. She is requesting a call back today.     Call back#: [phone]

## 2024-10-02 NOTE — TELEPHONE ENCOUNTER
Genet,    I called patient with an update on the patches. Patient states that its not a patch, it goes on the lip and it is called (On!) is asking if that is ok to use leading up to surgery. Please advise.

## 2024-10-03 ENCOUNTER — TELEPHONE (OUTPATIENT)
Dept: PAIN MEDICINE | Facility: CLINIC | Age: 39
End: 2024-10-03

## 2024-10-03 ENCOUNTER — TELEPHONE (OUTPATIENT)
Dept: OBGYN CLINIC | Facility: HOSPITAL | Age: 39
End: 2024-10-03

## 2024-10-07 DIAGNOSIS — F41.1 GAD (GENERALIZED ANXIETY DISORDER): ICD-10-CM

## 2024-10-07 RX ORDER — CLONAZEPAM 2 MG/1
2 TABLET ORAL 2 TIMES DAILY
Qty: 60 TABLET | Refills: 0 | Status: SHIPPED | OUTPATIENT
Start: 2024-10-07 | End: 2024-11-06

## 2024-10-07 NOTE — TELEPHONE ENCOUNTER
Reason for call:   [x] Refill   [] Prior Auth  [] Other:     Office:   [x] PCP/Provider -   Diane Lorenzo,   PCP - General  [] Specialty/Provider -     Medication:     clonazePAM (KlonoPIN) 2 mg tablet 2 mg, 2 times daily         Pharmacy: [facility] #[id number] - [place], [place] - [address]      Does the patient have enough for 3 days?   [] Yes   [x] No - Send as HP to POD

## 2024-10-14 NOTE — PROGRESS NOTES
Assessment:  1. Primary osteoarthritis of both knees    2. Neck pain    3. Cervical radiculopathy        Plan:  Orders Placed This Encounter   Procedures    FL spine and pain procedure     Standing Status:   Future     Standing Expiration Date:   10/16/2028     Order Specific Question:   Reason for Exam:     Answer:   bilateral genicular nerve block #1     Order Specific Question:   Anticoagulant hold needed?     Answer:   no     Order Specific Question:   Is the patient pregnant?     Answer:   Unknown       New Medications Ordered This Visit   Medications    traMADol (ULTRAM) 50 mg tablet     Sig: TAKE 1 TABLET EVERY 6-8 HOURS AS NEEDED FOR PAIN    Diclofenac Sodium (VOLTAREN) 1 %     Sig: Apply 2 g topically 4 (four) times a day     Dispense:  100 g     Refill:  0       My impressions and treatment recommendations were discussed in detail with the patient, who verbalized understanding and had no further questions.    38-year-old female presents her office with diffuse pain symptoms.  CT of the lumbar spine normal.  CT of the cervical spine with reported degenerative changes. also has degenerative disease both knees and will be getting surgery in January. Patient was describing multiple ailments but we focused visit primarily on the knees. She has chronic ongoing knee pain despite multiple injection treatments. We discussed genicular nerve block and RFA as potential treatment options as her surgery isn't until January. She would like to schedule. I also recommend voltaren gel 4 times a day over the knees.       [place] Prescription Drug Monitoring Program report was reviewed and was appropriate     Complete risks and benefits including bleeding, infection, tissue reaction, nerve injury and allergic reaction were discussed. The approach was demonstrated using models and literature was provided. Verbal and written consent was obtained.     Discharge instructions were provided. I personally saw and examined the  patient and I agree with the above discussed plan of care.    History of Present Illness:    Yoly Castro is a 38 y.o. female who presents to [facility] for initial evaluation of the above stated pain complaints. The patient has a past medical and chronic pain history as outlined in the assessment section. She was referred by Cary Loyd, DO  [address] [facility]  [address]  [place] [address] .    Patient reports complaints of neck pain into both arms.  Low back pain into both legs.  Bilateral knee pain.  Severe over the past 1.  10 and 10.  Burning, shooting, numbness, sharp, pins needles, throbbing, dull/aching.  There is subjective weakness of upper and lower extremities.    Neck and right knee hurt most today. She reports left>right pain in the neck. She reports it goes down arm with tingling int he left thumb and index fingers. She also has shootin gpain down the legs.     Pain is increased with standing, bending, walking, exercise.  No change with lying down, sitting, relaxation, coughing, sneezing.    She will be having bilateral knee replacement for arthritis in the future.    She reports no relief with any other modalities including exercise, PT, injections, heat/ice therapy, psychotherapy, acupuncture, hypnosis, chiropractor manipulation.    Patient has history of bipolar disorder, anxiety, depression, hypertension.  She is a chronic smoker.  Uses marijuana nightly.  Codeine, hydromorphone, oxycodone have provided relief.  In the past is also used tramadol.      Currently using lidocaine patch.  In the past does use Celebrex and ibuprofen.  In the past is also used Valium.  Has also used Cymbalta.  Currently using Klonopin.    Patient did see Dr. Patel in January 2024    Review of Systems:    Review of Systems   Constitutional:  Positive for unexpected weight change.   Musculoskeletal:  Positive for arthralgias, joint swelling and myalgias.   Neurological:   Positive for numbness.   Psychiatric/Behavioral:  Positive for decreased concentration. The patient is nervous/anxious.         Depression           Past Medical History:   Diagnosis Date    Anxiety     Asthma     Bipolar disorder (HCC)     Closed nondisplaced fracture of phalanx of left middle finger with routine healing 08/09/2022    Depression     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 03/25/2021    OCD (obsessive compulsive disorder)     Stroke (HCC)        Past Surgical History:   Procedure Laterality Date    BRAIN SURGERY      KNEE SURGERY         History reviewed. No pertinent family history.    Social History     Occupational History    Not on file   Tobacco Use    Smoking status: Every Day     Current packs/day: 1.00     Average packs/day: 1 pack/day for 25.0 years (25.0 ttl pk-yrs)     Types: Cigarettes    Smokeless tobacco: Never   Vaping Use    Vaping status: Never Used   Substance and Sexual Activity    Alcohol use: Never    Drug use: Never     Types: Marijuana    Sexual activity: Not Currently     Partners: Male     Birth control/protection: None         Current Outpatient Medications:     clonazePAM (KlonoPIN) 2 mg tablet, Take 1 tablet (2 mg total) by mouth 2 (two) times a day, Disp: 60 tablet, Rfl: 0    Diclofenac Sodium (VOLTAREN) 1 %, Apply 2 g topically 4 (four) times a day, Disp: 100 g, Rfl: 0    folic acid (FOLVITE) 1 mg tablet, Take 1 tablet (1 mg total) by mouth daily, Disp: 30 tablet, Rfl: 0    levothyroxine (Euthyrox) 50 mcg tablet, Take 1 tablet (50 mcg total) by mouth daily in the early morning, Disp: 30 tablet, Rfl: 5    acetaminophen-codeine (TYLENOL/CODEINE #3) 300-30 MG per tablet, Take 1 tablet by mouth 2 (two) times a day as needed for moderate pain (Patient not taking: Reported on 9/27/2024), Disp: 15 tablet, Rfl: 0    albuterol (Ventolin HFA) 90 mcg/act inhaler, Inhale 2 puffs every 6 (six) hours as needed for wheezing (Patient not taking: Reported on  "10/16/2024), Disp: 18 g, Rfl: 5    ascorbic acid (VITAMIN C) 500 MG tablet, Take 1 tablet (500 mg total) by mouth 2 (two) times a day (Patient not taking: Reported on 10/16/2024), Disp: 60 tablet, Rfl: 0    celecoxib (CeleBREX) 100 mg capsule, Take 1 capsule (100 mg total) by mouth 2 (two) times a day (Patient not taking: Reported on 9/5/2024), Disp: 60 capsule, Rfl: 1    enoxaparin (LOVENOX) 40 mg/0.4 mL, Inject 0.4 mL (40 mg total) under the skin daily for 28 days To start postoperatively (Patient not taking: Reported on 10/16/2024), Disp: 11.2 mL, Rfl: 0    ferrous sulfate 324 (65 Fe) mg, Take one tablet daily. (Patient not taking: Reported on 10/16/2024), Disp: 30 tablet, Rfl: 0    ibuprofen (MOTRIN) 800 mg tablet, Take 800 mg by mouth every 8 (eight) hours as needed (Patient not taking: Reported on 10/16/2024), Disp: , Rfl:     lidocaine (LIDODERM) 5 %, Place 3 patches on the skin daily as needed (Patient not taking: Reported on 9/5/2024), Disp: , Rfl:     naloxone (NARCAN) 4 mg/0.1 mL nasal spray, Administer 1 spray into a nostril. If no response after 2-3 minutes, give another dose in the other nostril using a new spray. (Patient not taking: Reported on 10/16/2024), Disp: 1 each, Rfl: 1    traMADol (ULTRAM) 50 mg tablet, TAKE 1 TABLET EVERY 6-8 HOURS AS NEEDED FOR PAIN (Patient not taking: Reported on 10/16/2024), Disp: , Rfl:     varenicline (Chantix Continuing Month Dano) 1 mg tablet, Take 1 tablet (1 mg total) by mouth 2 (two) times a day (Patient not taking: Reported on 10/16/2024), Disp: 60 tablet, Rfl: 1    Allergies   Allergen Reactions    Cortisone Rash and Swelling    Fluoxetine Confusion and Other (See Comments)     Patient states she went \"crazy, nuts.\" Unable to specify more clearly.    Pregabalin Rash and Swelling    Fentanyl Hives     Fever and migraine      Nitrofurantoin Swelling    Acetaminophen Rash    Hydrocodone-Acetaminophen Rash       Physical Exam:    /76   Pulse 78   Ht 5' 10\" " (1.778 m)   Wt 55.3 kg (122 lb)   BMI 17.51 kg/m²     Constitutional: normal, well developed, well nourished, alert, in no distress and non-toxic and no overt pain behavior.  Eyes: anicteric  HEENT: grossly intact  Neck: supple, symmetric, trachea midline and no masses   Pulmonary:even and unlabored  Cardiovascular:No edema or pitting edema present  Skin:Normal without rashes or lesions and well hydrated  Psychiatric:Mood and affect appropriate  Neurologic:Cranial Nerves II-XII grossly intact  Musculoskeletal: knees non erythematous no lesions. Painful to touch.     Imaging    CT lumbar spine without contrast   04/20/23    CLINICAL INFORMATION: Back pain.     TECHNIQUE: Unenhanced CT lumbar spine was performed. Multiplanar and 3-D   reformats were made.     COMPARISON: 05/01/2018.     FINDINGS:     Vertebrae: No acute fracture. Normal vertebral body heights.     Alignment: No significant spondylolisthesis. No spondylolysis.     Levels: Multilevel degenerative changes including osteophytes, Schmorl's nodes,   disc bulges, and facet arthropathy.     L1-2: No significant central canal or foraminal stenosis.   L2-3: No significant central canal or foraminal stenosis.   L3-4: No significant central canal or foraminal stenosis.   L4-5: No significant central canal or foraminal stenosis.   L5-S1: No significant central canal or foraminal stenosis.     Paraspinal soft tissues: Normal.     IMPRESSION:     No acute fracture or dislocation. Mild multilevel degenerative changes. No   significant central canal or foraminal stenosis. If there are no   contraindications, correlation with MRI lumbar spine is recommended.     FL spine and pain procedure    (Results Pending)       Orders Placed This Encounter   Procedures    FL spine and pain procedure

## 2024-10-14 NOTE — H&P (VIEW-ONLY)
Assessment:  1. Primary osteoarthritis of both knees    2. Neck pain    3. Cervical radiculopathy        Plan:  Orders Placed This Encounter   Procedures    FL spine and pain procedure     Standing Status:   Future     Standing Expiration Date:   10/16/2028     Order Specific Question:   Reason for Exam:     Answer:   bilateral genicular nerve block #1     Order Specific Question:   Anticoagulant hold needed?     Answer:   no     Order Specific Question:   Is the patient pregnant?     Answer:   Unknown       New Medications Ordered This Visit   Medications    traMADol (ULTRAM) 50 mg tablet     Sig: TAKE 1 TABLET EVERY 6-8 HOURS AS NEEDED FOR PAIN    Diclofenac Sodium (VOLTAREN) 1 %     Sig: Apply 2 g topically 4 (four) times a day     Dispense:  100 g     Refill:  0       My impressions and treatment recommendations were discussed in detail with the patient, who verbalized understanding and had no further questions.    38-year-old female presents her office with diffuse pain symptoms.  CT of the lumbar spine normal.  CT of the cervical spine with reported degenerative changes. also has degenerative disease both knees and will be getting surgery in January. Patient was describing multiple ailments but we focused visit primarily on the knees. She has chronic ongoing knee pain despite multiple injection treatments. We discussed genicular nerve block and RFA as potential treatment options as her surgery isn't until January. She would like to schedule. I also recommend voltaren gel 4 times a day over the knees.       [place] Prescription Drug Monitoring Program report was reviewed and was appropriate     Complete risks and benefits including bleeding, infection, tissue reaction, nerve injury and allergic reaction were discussed. The approach was demonstrated using models and literature was provided. Verbal and written consent was obtained.     Discharge instructions were provided. I personally saw and examined the  patient and I agree with the above discussed plan of care.    History of Present Illness:    Yoly Castro is a 38 y.o. female who presents to [facility] for initial evaluation of the above stated pain complaints. The patient has a past medical and chronic pain history as outlined in the assessment section. She was referred by Cary Loyd, DO  [address] [facility]  [address]  [place] [address] .    Patient reports complaints of neck pain into both arms.  Low back pain into both legs.  Bilateral knee pain.  Severe over the past 1.  10 and 10.  Burning, shooting, numbness, sharp, pins needles, throbbing, dull/aching.  There is subjective weakness of upper and lower extremities.    Neck and right knee hurt most today. She reports left>right pain in the neck. She reports it goes down arm with tingling int he left thumb and index fingers. She also has shootin gpain down the legs.     Pain is increased with standing, bending, walking, exercise.  No change with lying down, sitting, relaxation, coughing, sneezing.    She will be having bilateral knee replacement for arthritis in the future.    She reports no relief with any other modalities including exercise, PT, injections, heat/ice therapy, psychotherapy, acupuncture, hypnosis, chiropractor manipulation.    Patient has history of bipolar disorder, anxiety, depression, hypertension.  She is a chronic smoker.  Uses marijuana nightly.  Codeine, hydromorphone, oxycodone have provided relief.  In the past is also used tramadol.      Currently using lidocaine patch.  In the past does use Celebrex and ibuprofen.  In the past is also used Valium.  Has also used Cymbalta.  Currently using Klonopin.    Patient did see Dr. Patel in January 2024    Review of Systems:    Review of Systems   Constitutional:  Positive for unexpected weight change.   Musculoskeletal:  Positive for arthralgias, joint swelling and myalgias.   Neurological:   Positive for numbness.   Psychiatric/Behavioral:  Positive for decreased concentration. The patient is nervous/anxious.         Depression           Past Medical History:   Diagnosis Date    Anxiety     Asthma     Bipolar disorder (HCC)     Closed nondisplaced fracture of phalanx of left middle finger with routine healing 08/09/2022    Depression     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 03/25/2021    OCD (obsessive compulsive disorder)     Stroke (HCC)        Past Surgical History:   Procedure Laterality Date    BRAIN SURGERY      KNEE SURGERY         History reviewed. No pertinent family history.    Social History     Occupational History    Not on file   Tobacco Use    Smoking status: Every Day     Current packs/day: 1.00     Average packs/day: 1 pack/day for 25.0 years (25.0 ttl pk-yrs)     Types: Cigarettes    Smokeless tobacco: Never   Vaping Use    Vaping status: Never Used   Substance and Sexual Activity    Alcohol use: Never    Drug use: Never     Types: Marijuana    Sexual activity: Not Currently     Partners: Male     Birth control/protection: None         Current Outpatient Medications:     clonazePAM (KlonoPIN) 2 mg tablet, Take 1 tablet (2 mg total) by mouth 2 (two) times a day, Disp: 60 tablet, Rfl: 0    Diclofenac Sodium (VOLTAREN) 1 %, Apply 2 g topically 4 (four) times a day, Disp: 100 g, Rfl: 0    folic acid (FOLVITE) 1 mg tablet, Take 1 tablet (1 mg total) by mouth daily, Disp: 30 tablet, Rfl: 0    levothyroxine (Euthyrox) 50 mcg tablet, Take 1 tablet (50 mcg total) by mouth daily in the early morning, Disp: 30 tablet, Rfl: 5    acetaminophen-codeine (TYLENOL/CODEINE #3) 300-30 MG per tablet, Take 1 tablet by mouth 2 (two) times a day as needed for moderate pain (Patient not taking: Reported on 9/27/2024), Disp: 15 tablet, Rfl: 0    albuterol (Ventolin HFA) 90 mcg/act inhaler, Inhale 2 puffs every 6 (six) hours as needed for wheezing (Patient not taking: Reported on  "10/16/2024), Disp: 18 g, Rfl: 5    ascorbic acid (VITAMIN C) 500 MG tablet, Take 1 tablet (500 mg total) by mouth 2 (two) times a day (Patient not taking: Reported on 10/16/2024), Disp: 60 tablet, Rfl: 0    celecoxib (CeleBREX) 100 mg capsule, Take 1 capsule (100 mg total) by mouth 2 (two) times a day (Patient not taking: Reported on 9/5/2024), Disp: 60 capsule, Rfl: 1    enoxaparin (LOVENOX) 40 mg/0.4 mL, Inject 0.4 mL (40 mg total) under the skin daily for 28 days To start postoperatively (Patient not taking: Reported on 10/16/2024), Disp: 11.2 mL, Rfl: 0    ferrous sulfate 324 (65 Fe) mg, Take one tablet daily. (Patient not taking: Reported on 10/16/2024), Disp: 30 tablet, Rfl: 0    ibuprofen (MOTRIN) 800 mg tablet, Take 800 mg by mouth every 8 (eight) hours as needed (Patient not taking: Reported on 10/16/2024), Disp: , Rfl:     lidocaine (LIDODERM) 5 %, Place 3 patches on the skin daily as needed (Patient not taking: Reported on 9/5/2024), Disp: , Rfl:     naloxone (NARCAN) 4 mg/0.1 mL nasal spray, Administer 1 spray into a nostril. If no response after 2-3 minutes, give another dose in the other nostril using a new spray. (Patient not taking: Reported on 10/16/2024), Disp: 1 each, Rfl: 1    traMADol (ULTRAM) 50 mg tablet, TAKE 1 TABLET EVERY 6-8 HOURS AS NEEDED FOR PAIN (Patient not taking: Reported on 10/16/2024), Disp: , Rfl:     varenicline (Chantix Continuing Month Dano) 1 mg tablet, Take 1 tablet (1 mg total) by mouth 2 (two) times a day (Patient not taking: Reported on 10/16/2024), Disp: 60 tablet, Rfl: 1    Allergies   Allergen Reactions    Cortisone Rash and Swelling    Fluoxetine Confusion and Other (See Comments)     Patient states she went \"crazy, nuts.\" Unable to specify more clearly.    Pregabalin Rash and Swelling    Fentanyl Hives     Fever and migraine      Nitrofurantoin Swelling    Acetaminophen Rash    Hydrocodone-Acetaminophen Rash       Physical Exam:    /76   Pulse 78   Ht 5' 10\" " (1.778 m)   Wt 55.3 kg (122 lb)   BMI 17.51 kg/m²     Constitutional: normal, well developed, well nourished, alert, in no distress and non-toxic and no overt pain behavior.  Eyes: anicteric  HEENT: grossly intact  Neck: supple, symmetric, trachea midline and no masses   Pulmonary:even and unlabored  Cardiovascular:No edema or pitting edema present  Skin:Normal without rashes or lesions and well hydrated  Psychiatric:Mood and affect appropriate  Neurologic:Cranial Nerves II-XII grossly intact  Musculoskeletal: knees non erythematous no lesions. Painful to touch.     Imaging    CT lumbar spine without contrast   04/20/23    CLINICAL INFORMATION: Back pain.     TECHNIQUE: Unenhanced CT lumbar spine was performed. Multiplanar and 3-D   reformats were made.     COMPARISON: 05/01/2018.     FINDINGS:     Vertebrae: No acute fracture. Normal vertebral body heights.     Alignment: No significant spondylolisthesis. No spondylolysis.     Levels: Multilevel degenerative changes including osteophytes, Schmorl's nodes,   disc bulges, and facet arthropathy.     L1-2: No significant central canal or foraminal stenosis.   L2-3: No significant central canal or foraminal stenosis.   L3-4: No significant central canal or foraminal stenosis.   L4-5: No significant central canal or foraminal stenosis.   L5-S1: No significant central canal or foraminal stenosis.     Paraspinal soft tissues: Normal.     IMPRESSION:     No acute fracture or dislocation. Mild multilevel degenerative changes. No   significant central canal or foraminal stenosis. If there are no   contraindications, correlation with MRI lumbar spine is recommended.     FL spine and pain procedure    (Results Pending)       Orders Placed This Encounter   Procedures    FL spine and pain procedure

## 2024-10-16 ENCOUNTER — CONSULT (OUTPATIENT)
Dept: PAIN MEDICINE | Facility: CLINIC | Age: 39
End: 2024-10-16
Payer: COMMERCIAL

## 2024-10-16 ENCOUNTER — TELEPHONE (OUTPATIENT)
Dept: OBGYN CLINIC | Facility: HOSPITAL | Age: 39
End: 2024-10-16

## 2024-10-16 ENCOUNTER — TELEPHONE (OUTPATIENT)
Age: 39
End: 2024-10-16

## 2024-10-16 VITALS
DIASTOLIC BLOOD PRESSURE: 76 MMHG | HEART RATE: 78 BPM | SYSTOLIC BLOOD PRESSURE: 109 MMHG | WEIGHT: 122 LBS | BODY MASS INDEX: 17.47 KG/M2 | HEIGHT: 70 IN

## 2024-10-16 DIAGNOSIS — M17.0 PRIMARY OSTEOARTHRITIS OF BOTH KNEES: Primary | ICD-10-CM

## 2024-10-16 DIAGNOSIS — M54.2 NECK PAIN: ICD-10-CM

## 2024-10-16 DIAGNOSIS — M54.12 CERVICAL RADICULOPATHY: ICD-10-CM

## 2024-10-16 PROCEDURE — 99204 OFFICE O/P NEW MOD 45 MIN: CPT | Performed by: STUDENT IN AN ORGANIZED HEALTH CARE EDUCATION/TRAINING PROGRAM

## 2024-10-16 RX ORDER — TRAMADOL HYDROCHLORIDE 50 MG/1
TABLET ORAL
COMMUNITY
Start: 2024-09-11

## 2024-10-16 NOTE — PATIENT INSTRUCTIONS
Patient Education     Radiofrequency Ablation for Pain   Why is this procedure done?   Radiofrequency ablation is a procedure that uses electrical current to destroy nerves that are causing pain. This may be used when other treatments have failed to give lasting relief. Heat, cool, or drugs may also be used to help destroy the nerves. This procedure stops the nerve from sending pain signals to the brain. Doctors may suggest radiofrequency ablation to treat pain in your back, hips, knees, or neck if your pain is not eased by other treatments.  What will the results be?   The goal is that you will have less pain after the procedure.  What happens before the procedure?   Your doctor will take your history. Talk to your doctor about:  All the drugs you are taking. Be sure to include all prescription and over-the-counter (OTC) drugs and herbal supplements. Tell the doctor about any drug allergy. Bring a list of drugs you take with you.  Any bleeding problems. Be sure to tell your doctor if you are taking any drugs that may cause bleeding. Some of these are warfarin, rivaroxaban, apixaban, ticagrelor, clopidogrel, ketorolac, ibuprofen, naproxen, or aspirin. Certain vitamins and herbs, such as garlic and fish oil, may also add to the risk for bleeding. You may need to stop these drugs as well. Talk to your doctor about them.  If you need to stop eating or drinking before your procedure.  Your doctor will do an exam and may order:  Lab tests  X-rays  MRI or CT scan  Ultrasound  Electrocardiogram (ECG)  You may not be allowed to drive right away after the procedure. Ask a family member or a friend to drive you home.  What happens during the procedure?   Once you are in the procedure room, the staff may put an IV in your arm to give you fluids and drugs. You may be given a drug to help you relax.  The doctor will clean and numb the area.  The doctor may use a special kind of x-ray to guide a thin needle into the area where  you feel pain. You may feel a slight tingling. This lets the doctor know they are in the right area.  Next, the doctor places a thin electrode through the needle and destroys a small amount of the nerve tissue.  Then the doctor takes out the needle and electrode and covers the area with a bandage.  What happens after the procedure?   You will go to the Recovery Room and the staff will watch you closely. Most often you are able to go home the same day.  What problems could happen?   Bleeding  Infection  Weakness or numbness  Nerve damage  Last Reviewed Date   2021-11-16  Consumer Information Use and Disclaimer   This generalized information is a limited summary of diagnosis, treatment, and/or medication information. It is not meant to be comprehensive and should be used as a tool to help the user understand and/or assess potential diagnostic and treatment options. It does NOT include all information about conditions, treatments, medications, side effects, or risks that may apply to a specific patient. It is not intended to be medical advice or a substitute for the medical advice, diagnosis, or treatment of a health care provider based on the health care provider's examination and assessment of a patient’s specific and unique circumstances. Patients must speak with a health care provider for complete information about their health, medical questions, and treatment options, including any risks or benefits regarding use of medications. This information does not endorse any treatments or medications as safe, effective, or approved for treating a specific patient. [employer] and its affiliates disclaim any warranty or liability relating to this information or the use thereof. The use of this information is governed by the Terms of Use, available at https://www.woltersScirrauwer.com/en/know/clinical-effectiveness-terms   Copyright   Copyright © 2024 [employer] and its affiliates and/or licensors. All rights  reserved.

## 2024-10-16 NOTE — TELEPHONE ENCOUNTER
Caller: ravi Frederick    Doctor: Lan    Reason for call: pt called and has some questions about the bilateral genicular nerve block that is scheduled on 11/13    Call back#: [phone]

## 2024-10-16 NOTE — TELEPHONE ENCOUNTER
Caller: Patient    Doctor: Mario    Reason for call: [facility]t and would like to get a nerve block procedure on her right knee and wondered if that is ok to get since she has surgery scheduled on that knee 1/20/25. Please call patient. Thank you.    Call back#: [phone]

## 2024-10-17 ENCOUNTER — PATIENT MESSAGE (OUTPATIENT)
Dept: RADIOLOGY | Facility: CLINIC | Age: 39
End: 2024-10-17

## 2024-10-17 ENCOUNTER — TELEPHONE (OUTPATIENT)
Dept: RADIOLOGY | Facility: CLINIC | Age: 39
End: 2024-10-17

## 2024-10-17 NOTE — TELEPHONE ENCOUNTER
S/W pt and answered all questions  Pt wanted to make sure no steroids were involved and that Right knee was going to be done first  Advised both sides GNB are done together, but the ablations are not.  Advised we can have her get the Right Ablation done first   CB with questions or concerns

## 2024-10-17 NOTE — PATIENT COMMUNICATION
Pt scheduled for genicular nerve block with Dr Montenegro on 11/13/24    Pt is not diabetic and procedure type does not require prescription med holds    Pt given instruction sheet in office and sent instructions via "[employer] message    Have you completed PT/HEP/Chiro in the past 6 months for dedicated area? Per chart (media), pt is currently in PT with Abdoul -- pt has also tried meds for pain relief and had visco injections-- per Dr Montenegro's consult note, pt reported none of these provided pain relief  If yes, how long did you complete?  What was the frequency?  Did it provide relief?  If no, reason therapy was not completed?

## 2024-10-17 NOTE — TELEPHONE ENCOUNTER
Contacted [facility] regarding procedure scheduled (and to be scheduled) with Dr Montenegro     Per Melissa @ [facility], codes 31682 and 43584 will be covered by [facility] if auth is obtained -- can be submitted thru [employer].    Ref#: [id number]

## 2024-11-04 DIAGNOSIS — F41.1 GAD (GENERALIZED ANXIETY DISORDER): ICD-10-CM

## 2024-11-04 RX ORDER — CLONAZEPAM 2 MG/1
2 TABLET ORAL 2 TIMES DAILY
Qty: 60 TABLET | Refills: 0 | Status: SHIPPED | OUTPATIENT
Start: 2024-11-04 | End: 2024-12-04

## 2024-11-04 NOTE — TELEPHONE ENCOUNTER
Reason for call:   [x] Refill   [] Prior Auth  [] Other:     Office:   [x] PCP/Provider - [facility]/ DO Bj  [] Specialty/Provider -     Medication: clonazePAM (KlonoPIN) 2 mg tablet     Dose/Frequency: Take 1 tablet (2 mg total) by mouth 2 (two) times a day    Quantity: 60    Pharmacy: [id number] [place] [address] [address] [phone]    Does the patient have enough for 3 days?   [] Yes   [x] No - Send as HP to POD

## 2024-11-13 ENCOUNTER — HOSPITAL ENCOUNTER (OUTPATIENT)
Dept: RADIOLOGY | Facility: CLINIC | Age: 39
Discharge: HOME | End: 2024-11-13
Admitting: STUDENT IN AN ORGANIZED HEALTH CARE EDUCATION/TRAINING PROGRAM
Payer: COMMERCIAL

## 2024-11-13 VITALS
HEART RATE: 102 BPM | TEMPERATURE: 98.1 F | OXYGEN SATURATION: 98 % | RESPIRATION RATE: 17 BRPM | DIASTOLIC BLOOD PRESSURE: 65 MMHG | SYSTOLIC BLOOD PRESSURE: 94 MMHG

## 2024-11-13 DIAGNOSIS — M17.0 PRIMARY OSTEOARTHRITIS OF BOTH KNEES: ICD-10-CM

## 2024-11-13 PROCEDURE — 64454 NJX AA&/STRD GNCLR NRV BRNCH: CPT | Performed by: STUDENT IN AN ORGANIZED HEALTH CARE EDUCATION/TRAINING PROGRAM

## 2024-11-13 RX ORDER — BUPIVACAINE HCL/PF 2.5 MG/ML
6 VIAL (ML) INJECTION ONCE
Status: COMPLETED | OUTPATIENT
Start: 2024-11-13 | End: 2024-11-13

## 2024-11-13 RX ADMIN — BUPIVACAINE HYDROCHLORIDE 6 ML: 2.5 INJECTION, SOLUTION EPIDURAL; INFILTRATION; INTRACAUDAL at 11:35

## 2024-11-13 NOTE — DISCHARGE INSTR - LAB

## 2024-11-13 NOTE — INTERVAL H&P NOTE
Update: (This section must be completed if the H&P was completed greater than 24 hrs to procedure or admission)    H&P reviewed. After examining the patient, I find no changed to the H&P since it had been written.    Patient re-evaluated. Accept as history and physical.    Hernán Montenegro MD/November 13, 2024/11:23 AM

## 2024-11-14 ENCOUNTER — PATIENT MESSAGE (OUTPATIENT)
Dept: PAIN MEDICINE | Facility: CLINIC | Age: 39
End: 2024-11-14

## 2024-11-14 ENCOUNTER — TELEPHONE (OUTPATIENT)
Dept: RADIOLOGY | Facility: CLINIC | Age: 39
End: 2024-11-14

## 2024-11-14 DIAGNOSIS — M17.0 PRIMARY OSTEOARTHRITIS OF BOTH KNEES: Primary | ICD-10-CM

## 2024-11-14 NOTE — PATIENT COMMUNICATION
Pt scheduled for genicular nerve block 2 with Dr Montenegro on 12/11/24     Pt is not diabetic and procedure type does not require prescription med holds     Pt given instruction review via [employer] message     Have you completed PT/HEP/Chiro in the past 6 months for dedicated area? Per chart (media), pt is currently in PT with Abdoul -- pt has also tried meds for pain relief and had visco injections-- per Dr Montenegro's consult note, pt reported none of these provided pain relief  If yes, how long did you complete?  What was the frequency?  Did it provide relief?  If no, reason therapy was not completed?    Block 1 completed

## 2024-11-14 NOTE — TELEPHONE ENCOUNTER
S/P Kristian GNB #1    Pain diary received  Pt reported % relief throughout majority of trial    Please place GNB #2 order   [FreeTextEntry1] : 66 yo female smoker with hypertension, hyperlipidemia, and CAD (coronary artery calcifications per 5/7/22 lung cancer screening CT scan, but no myocardial ischemia per 6/2022 treadmill nuclear stress test).  ECG today demonstrated sinus rhythm with poor R wave progression.  Patient is clinically stable from CAD standpoint. Will continue amlodipine 2.5 mg po daily and aspirin 81 mg po daily. LDL = 99 per 1/22/24 labs. Will continue atorvastatin 40 mg po daily. Smoking cessation was again advised.  BP is currently adequately controlled. Will continue amlodipine 2.5 mg po daily.  Zio XT monitor was offered for further evaluation of palpitations, but patient declined at this time.

## 2024-11-29 ENCOUNTER — DOCUMENTATION (OUTPATIENT)
Dept: OBGYN CLINIC | Facility: CLINIC | Age: 39
End: 2024-11-29

## 2024-11-29 DIAGNOSIS — F41.1 GAD (GENERALIZED ANXIETY DISORDER): ICD-10-CM

## 2024-11-29 RX ORDER — CLONAZEPAM 2 MG/1
2 TABLET ORAL 2 TIMES DAILY
Qty: 60 TABLET | Refills: 0 | Status: SHIPPED | OUTPATIENT
Start: 2024-11-29 | End: 2024-12-29

## 2024-11-29 NOTE — TELEPHONE ENCOUNTER
Reason for call:   [x] Refill   [] Prior Auth  [] Other:     Office:   [x] PCP/Provider - NATALIE Lorenzo  [] Specialty/Provider -     Medication:   Clonazepam 2 mg, 1 bid, 60      Pharmacy:   [facility]      Does the patient have enough for 3 days?   [x] Yes   [] No - Send as HP to POD

## 2024-12-02 ENCOUNTER — TELEPHONE (OUTPATIENT)
Dept: PAIN MEDICINE | Facility: CLINIC | Age: 39
End: 2024-12-02

## 2024-12-02 NOTE — TELEPHONE ENCOUNTER
Renetta from PT s/w pt and pt advised she would like to cancel sx for now because she has noone to watch her son. She will call us when she is ready. LM for pt to advise we received message and have cancelled sx.

## 2024-12-11 ENCOUNTER — HOSPITAL ENCOUNTER (OUTPATIENT)
Dept: RADIOLOGY | Facility: CLINIC | Age: 39
Discharge: HOME | End: 2024-12-11
Payer: COMMERCIAL

## 2024-12-11 VITALS
DIASTOLIC BLOOD PRESSURE: 73 MMHG | OXYGEN SATURATION: 95 % | SYSTOLIC BLOOD PRESSURE: 105 MMHG | RESPIRATION RATE: 20 BRPM | HEART RATE: 86 BPM

## 2024-12-11 DIAGNOSIS — M17.0 PRIMARY OSTEOARTHRITIS OF BOTH KNEES: ICD-10-CM

## 2024-12-11 PROCEDURE — 64624 DSTRJ NULYT AGT GNCLR NRV: CPT | Performed by: STUDENT IN AN ORGANIZED HEALTH CARE EDUCATION/TRAINING PROGRAM

## 2024-12-11 RX ORDER — BUPIVACAINE HYDROCHLORIDE 7.5 MG/ML
3 INJECTION, SOLUTION EPIDURAL; RETROBULBAR ONCE
Status: COMPLETED | OUTPATIENT
Start: 2024-12-11 | End: 2024-12-11

## 2024-12-11 RX ADMIN — BUPIVACAINE HYDROCHLORIDE 3 ML: 7.5 INJECTION, SOLUTION EPIDURAL; RETROBULBAR at 10:53

## 2024-12-11 NOTE — H&P
History of Present Illness: The patient is a 38 y.o. female who presents with complaints of b/l knee pain    Past Medical History:   Diagnosis Date    Anxiety     Asthma     Bipolar disorder (HCC)     Closed nondisplaced fracture of phalanx of left middle finger with routine healing 08/09/2022    Depression     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 03/25/2021    OCD (obsessive compulsive disorder)     Stroke (HCC)        Past Surgical History:   Procedure Laterality Date    BRAIN SURGERY      KNEE SURGERY           Current Outpatient Medications:     acetaminophen-codeine (TYLENOL/CODEINE #3) 300-30 MG per tablet, Take 1 tablet by mouth 2 (two) times a day as needed for moderate pain (Patient not taking: Reported on 9/27/2024), Disp: 15 tablet, Rfl: 0    albuterol (Ventolin HFA) 90 mcg/act inhaler, Inhale 2 puffs every 6 (six) hours as needed for wheezing (Patient not taking: Reported on 10/16/2024), Disp: 18 g, Rfl: 5    ascorbic acid (VITAMIN C) 500 MG tablet, Take 1 tablet (500 mg total) by mouth 2 (two) times a day (Patient not taking: Reported on 10/16/2024), Disp: 60 tablet, Rfl: 0    celecoxib (CeleBREX) 100 mg capsule, Take 1 capsule (100 mg total) by mouth 2 (two) times a day (Patient not taking: Reported on 9/5/2024), Disp: 60 capsule, Rfl: 1    clonazePAM (KlonoPIN) 2 mg tablet, Take 1 tablet (2 mg total) by mouth 2 (two) times a day, Disp: 60 tablet, Rfl: 0    Diclofenac Sodium (VOLTAREN) 1 %, Apply 2 g topically 4 (four) times a day, Disp: 100 g, Rfl: 0    enoxaparin (LOVENOX) 40 mg/0.4 mL, Inject 0.4 mL (40 mg total) under the skin daily for 28 days To start postoperatively (Patient not taking: Reported on 10/16/2024), Disp: 11.2 mL, Rfl: 0    ferrous sulfate 324 (65 Fe) mg, Take one tablet daily. (Patient not taking: Reported on 10/16/2024), Disp: 30 tablet, Rfl: 0    folic acid (FOLVITE) 1 mg tablet, Take 1 tablet (1 mg total) by mouth daily, Disp: 30 tablet, Rfl: 0     "ibuprofen (MOTRIN) 800 mg tablet, Take 800 mg by mouth every 8 (eight) hours as needed (Patient not taking: Reported on 10/16/2024), Disp: , Rfl:     levothyroxine (Euthyrox) 50 mcg tablet, Take 1 tablet (50 mcg total) by mouth daily in the early morning, Disp: 30 tablet, Rfl: 5    lidocaine (LIDODERM) 5 %, Place 3 patches on the skin daily as needed (Patient not taking: Reported on 9/5/2024), Disp: , Rfl:     naloxone (NARCAN) 4 mg/0.1 mL nasal spray, Administer 1 spray into a nostril. If no response after 2-3 minutes, give another dose in the other nostril using a new spray. (Patient not taking: Reported on 10/16/2024), Disp: 1 each, Rfl: 1    traMADol (ULTRAM) 50 mg tablet, TAKE 1 TABLET EVERY 6-8 HOURS AS NEEDED FOR PAIN (Patient not taking: Reported on 10/16/2024), Disp: , Rfl:     varenicline (Chantix Continuing Month Dano) 1 mg tablet, Take 1 tablet (1 mg total) by mouth 2 (two) times a day (Patient not taking: Reported on 10/16/2024), Disp: 60 tablet, Rfl: 1    Allergies   Allergen Reactions    Cortisone Rash and Swelling    Fluoxetine Confusion and Other (See Comments)     Patient states she went \"crazy, nuts.\" Unable to specify more clearly.    Pregabalin Rash and Swelling    Fentanyl Hives     Fever and migraine      Nitrofurantoin Swelling    Acetaminophen Rash    Hydrocodone-Acetaminophen Rash       Physical Exam: There were no vitals filed for this visit.  General: Awake, Alert, Oriented x 3, Mood and affect appropriate  Respiratory: Respirations even and unlabored  Cardiovascular: Peripheral pulses intact; no edema  Musculoskeletal Exam: knees non erythematous no lesions    ASA Score: 3         Assessment:   1. Primary osteoarthritis of both knees        Plan: b/l genicular nerve block #2    "

## 2024-12-11 NOTE — DISCHARGE INSTR - LAB

## 2024-12-12 ENCOUNTER — TELEPHONE (OUTPATIENT)
Dept: RADIOLOGY | Facility: CLINIC | Age: 39
End: 2024-12-12

## 2024-12-12 NOTE — TELEPHONE ENCOUNTER
S/P Kristian Genicular NB #2 yesterday    Pain diary uploaded x 2 by pt.    Pt had % relief through the 12 hours.  About a half hour later at 1130, pain was 10/10 until about 0530, then 5/10      Per protocol, please place orders for RFA's

## 2024-12-13 ENCOUNTER — PATIENT MESSAGE (OUTPATIENT)
Dept: PAIN MEDICINE | Facility: CLINIC | Age: 39
End: 2024-12-13

## 2024-12-13 DIAGNOSIS — M17.0 PRIMARY OSTEOARTHRITIS OF BOTH KNEES: Primary | ICD-10-CM

## 2024-12-13 NOTE — PATIENT COMMUNICATION
Pt is scheduled for genicular RFAs with Dr Montenegro on 1/14/25 and 1/21/25     Pt is not diabetic and procedure type does not require prescription med holds     Pt given instruction review via [facility] message     Have you completed PT/HEP/Chiro in the past 6 months for dedicated area? Per chart (media), pt is currently in PT with Abdoul -- pt has also tried meds for pain relief and had visco injections-- per Dr Montenegro's consult note, pt reported none of these provided pain relief  If yes, how long did you complete?  What was the frequency?  Did it provide relief?  If no, reason therapy was not completed?    Blocks completed

## 2024-12-13 NOTE — TELEPHONE ENCOUNTER
Caller: pt    Doctor: db    Reason for call: pt is calling back to get scheduled.    Call back#: [phone]

## 2024-12-23 DIAGNOSIS — E03.9 ACQUIRED HYPOTHYROIDISM: ICD-10-CM

## 2024-12-23 DIAGNOSIS — F41.1 GAD (GENERALIZED ANXIETY DISORDER): ICD-10-CM

## 2024-12-23 RX ORDER — CLONAZEPAM 2 MG/1
2 TABLET ORAL 2 TIMES DAILY
Qty: 60 TABLET | Refills: 0 | Status: SHIPPED | OUTPATIENT
Start: 2024-12-23 | End: 2025-01-22

## 2024-12-23 RX ORDER — LEVOTHYROXINE SODIUM 50 UG/1
50 TABLET ORAL
Qty: 30 TABLET | Refills: 5 | Status: SHIPPED | OUTPATIENT
Start: 2024-12-23 | End: 2025-06-21

## 2024-12-23 NOTE — TELEPHONE ENCOUNTER
Reason for call:   [x] Refill   [] Prior Auth  [] Other:     Office:   [x] PCP/Provider - Bj  [] Specialty/Provider -     Medication:   Clonazepam 2 mg, 1 bid, 60  Levothyroxine 50 mcg, 1 qd, 30    Pharmacy:   [facility]    Does the patient have enough for 3 days?   [] Yes   [x] No - Send as HP to POD

## 2024-12-24 ENCOUNTER — TELEPHONE (OUTPATIENT)
Age: 39
End: 2024-12-24

## 2024-12-24 NOTE — TELEPHONE ENCOUNTER
PA for clonazePAM (KlonoPIN) 2 mg tablet SUBMITTED to     via    []CMM-KEY:   []Surescripts-Case ID #   []Availity-Auth ID # NDC #   []Faxed to plan   [x]Other website [employer] ID# [id number]  []Phone call Case ID #     [x]PA sent as URGENT    All office notes, labs and other pertaining documents and studies sent. Clinical questions answered. Awaiting determination from insurance company.     Turnaround time for your insurance to make a decision on your Prior Authorization can take 7-21 business days.

## 2025-01-03 ENCOUNTER — TELEPHONE (OUTPATIENT)
Age: 40
End: 2025-01-03

## 2025-01-03 NOTE — TELEPHONE ENCOUNTER
Irma from [facility] called to confirm the pts procedure appts.  They received to authorizations and wanted to make sure it was a mistake.  I made Irma aware that the pt is getting the right side done on 1/14 and the left side on 1/21

## 2025-01-13 ENCOUNTER — TELEPHONE (OUTPATIENT)
Dept: OTHER | Facility: OTHER | Age: 40
End: 2025-01-13

## 2025-01-14 NOTE — TELEPHONE ENCOUNTER
Lm for pt -- can r/s 1/14 right knee to 1/21 and move left to a date in feb, or just r/s right -- asked for rcb

## 2025-01-14 NOTE — TELEPHONE ENCOUNTER
Caller: Yoly    Doctor: SP    Reason for call: pt returning the nurses call to reschedule her procedure     Call back#: [phone]

## 2025-01-14 NOTE — TELEPHONE ENCOUNTER
Patient is calling regarding cancelling a procedure.    Date/Time: 1/14/2025, 9 AM    Performing Physician:  Radiology    Performing Physician/Nursing Supervisor Notified?:  YES [] NO [x]    Patient requesting call back to reschedule: YES [x] NO []

## 2025-01-20 DIAGNOSIS — F41.1 GAD (GENERALIZED ANXIETY DISORDER): ICD-10-CM

## 2025-01-20 RX ORDER — CLONAZEPAM 2 MG/1
2 TABLET ORAL 2 TIMES DAILY
Qty: 60 TABLET | Refills: 0 | Status: SHIPPED | OUTPATIENT
Start: 2025-01-20 | End: 2025-02-19

## 2025-01-20 NOTE — TELEPHONE ENCOUNTER
Reason for call:   [x] Refill   [] Prior Auth  [] Other:     Office: [facility]   [x] PCP/Provider - Felicity Lorenzo   [] Specialty/Provider -     Medication: clonazepam     Dose/Frequency: 2 mg/ twice daily     Quantity: 30 day supply     Pharmacy: [facility] in [place]     Does the patient have enough for 3 days?   [x] Yes   [] No - Send as HP to POD

## 2025-01-21 ENCOUNTER — HOSPITAL ENCOUNTER (OUTPATIENT)
Dept: RADIOLOGY | Facility: CLINIC | Age: 40
Discharge: HOME | End: 2025-01-21
Payer: COMMERCIAL

## 2025-01-21 ENCOUNTER — TELEPHONE (OUTPATIENT)
Dept: RADIOLOGY | Facility: CLINIC | Age: 40
End: 2025-01-21

## 2025-01-21 VITALS
RESPIRATION RATE: 18 BRPM | OXYGEN SATURATION: 96 % | TEMPERATURE: 97.8 F | DIASTOLIC BLOOD PRESSURE: 76 MMHG | HEART RATE: 69 BPM | SYSTOLIC BLOOD PRESSURE: 113 MMHG

## 2025-01-21 DIAGNOSIS — M17.0 PRIMARY OSTEOARTHRITIS OF BOTH KNEES: ICD-10-CM

## 2025-01-21 PROCEDURE — 64624 DSTRJ NULYT AGT GNCLR NRV: CPT | Performed by: STUDENT IN AN ORGANIZED HEALTH CARE EDUCATION/TRAINING PROGRAM

## 2025-01-21 RX ADMIN — LIDOCAINE HYDROCHLORIDE 3 ML: 20 INJECTION, SOLUTION EPIDURAL; INFILTRATION; INTRACAUDAL at 10:45

## 2025-01-21 NOTE — H&P
History of Present Illness: The patient is a 39 y.o. female who presents with complaints of right knee pain    Past Medical History:   Diagnosis Date    Anxiety     Asthma     Bipolar disorder (HCC)     Closed nondisplaced fracture of phalanx of left middle finger with routine healing 08/09/2022    Depression     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 03/25/2021    OCD (obsessive compulsive disorder)     Stroke (HCC)        Past Surgical History:   Procedure Laterality Date    BRAIN SURGERY      KNEE SURGERY           Current Outpatient Medications:     acetaminophen-codeine (TYLENOL/CODEINE #3) 300-30 MG per tablet, Take 1 tablet by mouth 2 (two) times a day as needed for moderate pain (Patient not taking: Reported on 9/27/2024), Disp: 15 tablet, Rfl: 0    albuterol (Ventolin HFA) 90 mcg/act inhaler, Inhale 2 puffs every 6 (six) hours as needed for wheezing (Patient not taking: Reported on 10/16/2024), Disp: 18 g, Rfl: 5    ascorbic acid (VITAMIN C) 500 MG tablet, Take 1 tablet (500 mg total) by mouth 2 (two) times a day (Patient not taking: Reported on 10/16/2024), Disp: 60 tablet, Rfl: 0    celecoxib (CeleBREX) 100 mg capsule, Take 1 capsule (100 mg total) by mouth 2 (two) times a day (Patient not taking: Reported on 9/5/2024), Disp: 60 capsule, Rfl: 1    clonazePAM (KlonoPIN) 2 mg tablet, Take 1 tablet (2 mg total) by mouth 2 (two) times a day, Disp: 60 tablet, Rfl: 0    Diclofenac Sodium (VOLTAREN) 1 %, Apply 2 g topically 4 (four) times a day, Disp: 100 g, Rfl: 0    enoxaparin (LOVENOX) 40 mg/0.4 mL, Inject 0.4 mL (40 mg total) under the skin daily for 28 days To start postoperatively (Patient not taking: Reported on 10/16/2024), Disp: 11.2 mL, Rfl: 0    ferrous sulfate 324 (65 Fe) mg, Take one tablet daily. (Patient not taking: Reported on 10/16/2024), Disp: 30 tablet, Rfl: 0    folic acid (FOLVITE) 1 mg tablet, Take 1 tablet (1 mg total) by mouth daily, Disp: 30 tablet, Rfl: 0     "ibuprofen (MOTRIN) 800 mg tablet, Take 800 mg by mouth every 8 (eight) hours as needed (Patient not taking: Reported on 10/16/2024), Disp: , Rfl:     levothyroxine (Euthyrox) 50 mcg tablet, Take 1 tablet (50 mcg total) by mouth daily in the early morning, Disp: 30 tablet, Rfl: 5    lidocaine (LIDODERM) 5 %, Place 3 patches on the skin daily as needed (Patient not taking: Reported on 9/5/2024), Disp: , Rfl:     naloxone (NARCAN) 4 mg/0.1 mL nasal spray, Administer 1 spray into a nostril. If no response after 2-3 minutes, give another dose in the other nostril using a new spray. (Patient not taking: Reported on 10/16/2024), Disp: 1 each, Rfl: 1    traMADol (ULTRAM) 50 mg tablet, TAKE 1 TABLET EVERY 6-8 HOURS AS NEEDED FOR PAIN (Patient not taking: Reported on 10/16/2024), Disp: , Rfl:     varenicline (Chantix Continuing Month Dano) 1 mg tablet, Take 1 tablet (1 mg total) by mouth 2 (two) times a day (Patient not taking: Reported on 10/16/2024), Disp: 60 tablet, Rfl: 1    Allergies   Allergen Reactions    Cortisone Rash and Swelling    Fluoxetine Confusion and Other (See Comments)     Patient states she went \"crazy, nuts.\" Unable to specify more clearly.    Pregabalin Rash and Swelling    Fentanyl Hives     Fever and migraine      Nitrofurantoin Swelling    Acetaminophen Rash    Hydrocodone-Acetaminophen Rash       Physical Exam: There were no vitals filed for this visit.  General: Awake, Alert, Oriented x 3, Mood and affect appropriate  Respiratory: Respirations even and unlabored  Cardiovascular: Peripheral pulses intact; no edema  Musculoskeletal Exam: knee non erythematous no lesions    ASA Score: 3         Assessment:   1. Primary osteoarthritis of both knees        Plan: RIGHT genicular RFA    "

## 2025-01-21 NOTE — DISCHARGE INSTR - LAB

## 2025-01-22 ENCOUNTER — PATIENT MESSAGE (OUTPATIENT)
Dept: RADIOLOGY | Facility: CLINIC | Age: 40
End: 2025-01-22

## 2025-01-22 NOTE — TELEPHONE ENCOUNTER
Quite soon to have infection related to procedure. I would recommend she recheck her temperature later today. Can also send us a picture of the knee.

## 2025-01-30 ENCOUNTER — TELEPHONE (OUTPATIENT)
Dept: RADIOLOGY | Facility: CLINIC | Age: 40
End: 2025-01-30

## 2025-01-30 NOTE — PATIENT COMMUNICATION
Caller: Patient     Doctor: Dr. Montenegro     Reason for call: Calling to advise the right knee is still in a lot of pain. The swelling and redness went away. She has been trying everything and not getting any relief.    She is looking to see if a stronger ibuprofen could be prescribed to help take the edge off.     She is worried because it has been over a week and she is still having a lot of pain.     Call back#: [phone]

## 2025-01-30 NOTE — TELEPHONE ENCOUNTER
"S/W pt regarding right knee symptoms as she stated she could not come in this week due to her son being ill.  Pt states \"on the top\" of the knee when she jumps she feels a sharp stabbing pain like she is being injections with something.  Denies swelling, redness or fever, just pain. Denies sun burn like pain.  Earliest appt for next week is 2/4 with MG.    Any other recs until she can be seen?  "

## 2025-01-31 DIAGNOSIS — M25.569 KNEE PAIN, UNSPECIFIED CHRONICITY, UNSPECIFIED LATERALITY: Primary | ICD-10-CM

## 2025-01-31 RX ORDER — IBUPROFEN 800 MG/1
800 TABLET, FILM COATED ORAL EVERY 8 HOURS PRN
Qty: 30 TABLET | Refills: 0 | Status: SHIPPED | OUTPATIENT
Start: 2025-01-31

## 2025-01-31 NOTE — TELEPHONE ENCOUNTER
Caller: Patient    Doctor: Dr. Montenegro    Reason for call: Patient calling to see the status of her outreach with nurse.  Patient states she was supposed to receive a f/u call to get scheduled with MG but never heard back.  There are no appointments until 2/20 that would work as she is unable to do before 10 am.  Patient is aware prescription called into her pharmacy and will start taking.  Patient also states that she no longer takes Celebrex. Please call patient Monday to discuss other options.    Thank you.     Call back#: [phone]

## 2025-02-03 NOTE — TELEPHONE ENCOUNTER
Pt denies fever at this time. Denies any s/s of infection to knee.  Advised pt that fever unlikely r/t procedure. Advised she should reach out to PCP or urgent care to determine cause of fever.  Pt states she had the flu but is no longer sick.  Advised if fever returns, she shoould call PCP  Pt will f/u 2/18 for next procedure.

## 2025-02-17 ENCOUNTER — TELEPHONE (OUTPATIENT)
Age: 40
End: 2025-02-17

## 2025-02-17 ENCOUNTER — TELEPHONE (OUTPATIENT)
Dept: OTHER | Facility: OTHER | Age: 40
End: 2025-02-17

## 2025-02-17 DIAGNOSIS — F41.1 GAD (GENERALIZED ANXIETY DISORDER): ICD-10-CM

## 2025-02-17 RX ORDER — CLONAZEPAM 2 MG/1
2 TABLET ORAL 2 TIMES DAILY
Qty: 60 TABLET | Refills: 0 | Status: SHIPPED | OUTPATIENT
Start: 2025-02-18 | End: 2025-03-20

## 2025-02-17 NOTE — TELEPHONE ENCOUNTER
Medication:      clonazePAM (KlonoPIN) 2 mg tablet Take 1 tablet (2 mg total) by mouth 2 (two) times a day                      Dose/Frequency:      clonazePAM (KlonoPIN) 2 mg tablet Take 1 tablet (2 mg total) by mouth 2 (two) times a day                      Quantity: 60    Pharmacy: [id number] - [place] - [facility] [address]     Office:   [x] PCP/Provider -   [] Speciality/Provider -     Does the patient have enough for 3 days?   [] Yes   [] No - Send as HP to POD

## 2025-02-17 NOTE — TELEPHONE ENCOUNTER
Patient is calling regarding cancelling an appointment.    Date/Time: 2/18/25 10:00am    Patient was rescheduled: YES [] NO [x]    Patient requesting call back to reschedule: YES [x] NO []    Pt reached answering service, she has a fever and would like to reschedule.

## 2025-02-26 ENCOUNTER — TELEPHONE (OUTPATIENT)
Age: 40
End: 2025-02-26

## 2025-02-26 NOTE — TELEPHONE ENCOUNTER
She needs a visit.  I cannot put in multiple orders office visit since has been over 6 months that she has been here

## 2025-02-26 NOTE — TELEPHONE ENCOUNTER
Patient called in stating she hasn't been feeling well. Patient has been stuffed up and she had a fever. Did offer patient same day appt with Dr. Lorenzo but she couldn't today and did offer to see if she could see another provider at office. She  just asked if Dr. Lorenzo could put in lab work so she could get tested for everything including STDs please advise and call patient back

## 2025-03-16 DIAGNOSIS — F41.1 GAD (GENERALIZED ANXIETY DISORDER): ICD-10-CM

## 2025-03-17 ENCOUNTER — OFFICE VISIT (OUTPATIENT)
Age: 40
End: 2025-03-17
Payer: COMMERCIAL

## 2025-03-17 ENCOUNTER — APPOINTMENT (OUTPATIENT)
Age: 40
End: 2025-03-17
Payer: COMMERCIAL

## 2025-03-17 VITALS
DIASTOLIC BLOOD PRESSURE: 60 MMHG | OXYGEN SATURATION: 100 % | SYSTOLIC BLOOD PRESSURE: 100 MMHG | HEIGHT: 70 IN | HEART RATE: 78 BPM | BODY MASS INDEX: 18.67 KG/M2 | TEMPERATURE: 97.8 F | WEIGHT: 130.4 LBS | RESPIRATION RATE: 16 BRPM

## 2025-03-17 DIAGNOSIS — R06.2 WHEEZING: ICD-10-CM

## 2025-03-17 DIAGNOSIS — F17.200 TOBACCO DEPENDENCE: ICD-10-CM

## 2025-03-17 DIAGNOSIS — D72.89 ATYPICAL LYMPHOCYTES PRESENT ON PERIPHERAL BLOOD SMEAR: ICD-10-CM

## 2025-03-17 DIAGNOSIS — E03.9 ACQUIRED HYPOTHYROIDISM: ICD-10-CM

## 2025-03-17 DIAGNOSIS — M25.561 CHRONIC PAIN OF RIGHT KNEE: ICD-10-CM

## 2025-03-17 DIAGNOSIS — Z12.31 ENCOUNTER FOR SCREENING MAMMOGRAM FOR BREAST CANCER: ICD-10-CM

## 2025-03-17 DIAGNOSIS — Z30.09 ENCOUNTER FOR COUNSELING REGARDING CONTRACEPTION: ICD-10-CM

## 2025-03-17 DIAGNOSIS — G89.29 CHRONIC PAIN OF RIGHT KNEE: ICD-10-CM

## 2025-03-17 DIAGNOSIS — F41.1 GAD (GENERALIZED ANXIETY DISORDER): ICD-10-CM

## 2025-03-17 DIAGNOSIS — Z00.00 ANNUAL PHYSICAL EXAM: Primary | ICD-10-CM

## 2025-03-17 DIAGNOSIS — Z00.00 ANNUAL PHYSICAL EXAM: ICD-10-CM

## 2025-03-17 LAB
ERYTHROCYTE [DISTWIDTH] IN BLOOD BY AUTOMATED COUNT: 13.5 % (ref 11.6–15.1)
HCT VFR BLD AUTO: 39.6 % (ref 34.8–46.1)
HGB BLD-MCNC: 13.3 G/DL (ref 11.5–15.4)
MCH RBC QN AUTO: 30.6 PG (ref 26.8–34.3)
MCHC RBC AUTO-ENTMCNC: 33.6 G/DL (ref 31.4–37.4)
MCV RBC AUTO: 91 FL (ref 82–98)
NRBC BLD AUTO-RTO: 0 /100 WBCS
PLATELET # BLD AUTO: 264 THOUSANDS/UL (ref 149–390)
PMV BLD AUTO: 11.3 FL (ref 8.9–12.7)
RBC # BLD AUTO: 4.34 MILLION/UL (ref 3.81–5.12)
WBC # BLD AUTO: 9.29 THOUSAND/UL (ref 4.31–10.16)

## 2025-03-17 PROCEDURE — 80061 LIPID PANEL: CPT

## 2025-03-17 PROCEDURE — 88184 FLOWCYTOMETRY/ TC 1 MARKER: CPT

## 2025-03-17 PROCEDURE — 80053 COMPREHEN METABOLIC PANEL: CPT

## 2025-03-17 PROCEDURE — 36415 COLL VENOUS BLD VENIPUNCTURE: CPT

## 2025-03-17 PROCEDURE — 99214 OFFICE O/P EST MOD 30 MIN: CPT | Performed by: FAMILY MEDICINE

## 2025-03-17 PROCEDURE — 85007 BL SMEAR W/DIFF WBC COUNT: CPT

## 2025-03-17 PROCEDURE — 99406 BEHAV CHNG SMOKING 3-10 MIN: CPT | Performed by: FAMILY MEDICINE

## 2025-03-17 PROCEDURE — 99396 PREV VISIT EST AGE 40-64: CPT | Performed by: FAMILY MEDICINE

## 2025-03-17 PROCEDURE — 84443 ASSAY THYROID STIM HORMONE: CPT

## 2025-03-17 PROCEDURE — 88185 FLOWCYTOMETRY/TC ADD-ON: CPT | Performed by: PATHOLOGY

## 2025-03-17 PROCEDURE — 83036 HEMOGLOBIN GLYCOSYLATED A1C: CPT

## 2025-03-17 PROCEDURE — 82728 ASSAY OF FERRITIN: CPT

## 2025-03-17 RX ORDER — FOLIC ACID 1 MG/1
1 TABLET ORAL DAILY
Qty: 30 TABLET | Refills: 5 | Status: SHIPPED | OUTPATIENT
Start: 2025-03-17

## 2025-03-17 RX ORDER — ALBUTEROL SULFATE 90 UG/1
2 INHALANT RESPIRATORY (INHALATION) EVERY 6 HOURS PRN
Qty: 18 G | Refills: 5 | Status: SHIPPED | OUTPATIENT
Start: 2025-03-17

## 2025-03-17 RX ORDER — CLONAZEPAM 2 MG/1
2 TABLET ORAL 2 TIMES DAILY
Qty: 60 TABLET | Refills: 0 | Status: SHIPPED | OUTPATIENT
Start: 2025-03-17 | End: 2025-04-16

## 2025-03-17 NOTE — PROGRESS NOTES
Adult Annual Physical  Name: Yoly Castro      : 1985      MRN: [id number]  Encounter Provider: Diane Lorenzo DO  Encounter Date: 3/17/2025   Encounter department: [facility] [place]    Assessment & Plan  Annual physical exam    Orders:    Comprehensive metabolic panel; Future    Lipid Panel with Direct LDL reflex; Future    Ferritin; Future    Encounter for screening mammogram for breast cancer    Orders:    Mammo screening bilateral w cad; Future    SHASHI (generalized anxiety disorder)  Fair control with daily Klonopin therapy.  Patient to continue       Acquired hypothyroidism  TSH within normal limits 6 months ago on current levothyroxine dose of 50 mcg daily.  Will continue to monitor and titrate dose accordingly  Orders:    TSH, 3rd generation with Free T4 reflex; Future    Hemoglobin A1C; Future    Tobacco dependence  See below       Atypical lymphocytes present on peripheral blood smear  Noted on CBC 6 months ago.  Repeat ordered for continue monitoring.  Orders:    CBC and differential; Future    Peripheral Smear; Future    Encounter for counseling regarding contraception  And consistent use of OCP.  Would like to discuss LARC  Orders:    Ambulatory Referral to Obstetrics / Gynecology; Future    Chronic pain of right knee  Following with orthopedic.  Refill provided.  Orders:    folic acid (FOLVITE) 1 mg tablet; Take 1 tablet (1 mg total) by mouth daily      Preventive Screenings:  - Diabetes Screening: screening up-to-date  - Hepatitis C screening: screening up-to-date   - HIV screening: screening up-to-date   - Cervical cancer screening: risks/benefits discussed and orders placed   - Breast cancer screening: risks/benefits discussed and orders placed   - Colon cancer screening: screening up-to-date   - Lung cancer screening: screening not indicated   - Prostate cancer screening: screening not indicated     Counseling/Anticipatory Guidance:    - Tobacco use: discussed  "harms of tobacco use and management options for quitting.   - Diet: discussed recommendations for a healthy/well-balanced diet.   - Injury prevention: discussed safety/seat belts, safety helmets, smoke detectors, carbon monoxide detectors, and smoking near bedding or upholstery.       Tobacco Cessation Counseling: Tobacco cessation counseling was provided. The patient is sincerely urged to quit consumption of tobacco. She is not ready to quit tobacco. Medication options and side effects of medication discussed. Varenicline (chantix) was prescribed. 5 minutes of smoking cessation counseling provided.  Patient already has Valtrex at home.  Encouraged to use        History of Present Illness     Adult Annual Physical:  Patient presents for annual physical.     Diet and Physical Activity:  - Diet/Nutrition: no special diet.  - Exercise: no formal exercise.    Depression Screening:  - PHQ-2 Score: 2    General Health:  - Sleep: sleeps poorly.  - Hearing: normal hearing bilateral ears.  - Vision: no vision problems.  - Dental: regular dental visits.    /GYN Health:    - Menopause: premenopausal.     Review of Systems   Constitutional:  Positive for fatigue.   Respiratory:  Negative for cough.    Cardiovascular:  Negative for chest pain.   Gastrointestinal:  Negative for constipation and diarrhea.   Musculoskeletal:  Positive for arthralgias and gait problem.   Neurological:  Negative for light-headedness.   Psychiatric/Behavioral:  Positive for sleep disturbance. The patient is nervous/anxious.          Objective   /60 (BP Location: Right arm, Patient Position: Sitting, Cuff Size: Standard)   Pulse 78   Temp 97.8 °F (36.6 °C) (Tympanic)   Resp 16   Ht 5' 10\" (1.778 m)   Wt 59.1 kg (130 lb 6.4 oz)   SpO2 100%   BMI 18.71 kg/m²     Physical Exam  HENT:      Head: Normocephalic and atraumatic.      Right Ear: External ear normal.      Left Ear: External ear normal.   Eyes:      Conjunctiva/sclera: Conjunctivae " normal.      Pupils: Pupils are equal, round, and reactive to light.   Cardiovascular:      Rate and Rhythm: Normal rate and regular rhythm.   Pulmonary:      Effort: Pulmonary effort is normal.      Breath sounds: Normal breath sounds.   Abdominal:      General: Bowel sounds are normal.      Palpations: Abdomen is soft.   Musculoskeletal:      Right lower leg: No edema.      Left lower leg: No edema.   Neurological:      Mental Status: She is alert and oriented to person, place, and time.      Gait: Gait normal.   Psychiatric:         Mood and Affect: Mood normal.         Behavior: Behavior normal.

## 2025-03-17 NOTE — ASSESSMENT & PLAN NOTE
TSH within normal limits 6 months ago on current levothyroxine dose of 50 mcg daily.  Will continue to monitor and titrate dose accordingly  Orders:    TSH, 3rd generation with Free T4 reflex; Future    Hemoglobin A1C; Future

## 2025-03-17 NOTE — PATIENT INSTRUCTIONS
"Patient Education     Routine physical for adults   The Basics   Written by the doctors and editors at [facility]   What is a physical? -- A physical is a routine visit, or \"check-up,\" with your doctor. You might also hear it called a \"wellness visit\" or \"preventive visit.\"  During each visit, the doctor will:   Ask about your physical and mental health   Ask about your habits, behaviors, and lifestyle   Do an exam   Give you vaccines if needed   Talk to you about any medicines you take   Give advice about your health   Answer your questions  Getting regular check-ups is an important part of taking care of your health. It can help your doctor find and treat any problems you have. But it's also important for preventing health problems.  A routine physical is different from a \"sick visit.\" A sick visit is when you see a doctor because of a health concern or problem. Since physicals are scheduled ahead of time, you can think about what you want to ask the doctor.  How often should I get a physical? -- It depends on your age and health. In general, for people age 21 years and older:   If you are younger than 50 years, you might be able to get a physical every 3 years.   If you are 50 years or older, your doctor might recommend a physical every year.  If you have an ongoing health condition, like diabetes or high blood pressure, your doctor will probably want to see you more often.  What happens during a physical? -- In general, each visit will include:   Physical exam - The doctor or nurse will check your height, weight, heart rate, and blood pressure. They will also look at your eyes and ears. They will ask about how you are feeling and whether you have any symptoms that bother you.   Medicines - It's a good idea to bring a list of all the medicines you take to each doctor visit. Your doctor will talk to you about your medicines and answer any questions. Tell them if you are having any side effects that bother you. You " "should also tell them if you are having trouble paying for any of your medicines.   Habits and behaviors - This includes:   Your diet   Your exercise habits   Whether you smoke, drink alcohol, or use drugs   Whether you are sexually active   Whether you feel safe at home  Your doctor will talk to you about things you can do to improve your health and lower your risk of health problems. They will also offer help and support. For example, if you want to quit smoking, they can give you advice and might prescribe medicines. If you want to improve your diet or get more physical activity, they can help you with this, too.   Lab tests, if needed - The tests you get will depend on your age and situation. For example, your doctor might want to check your:   Cholesterol   Blood sugar   Iron level   Vaccines - The recommended vaccines will depend on your age, health, and what vaccines you already had. Vaccines are very important because they can prevent certain serious or deadly infections.   Discussion of screening - \"Screening\" means checking for diseases or other health problems before they cause symptoms. Your doctor can recommend screening based on your age, risk, and preferences. This might include tests to check for:   Cancer, such as breast, prostate, cervical, ovarian, colorectal, prostate, lung, or skin cancer   Sexually transmitted infections, such as chlamydia and gonorrhea   Mental health conditions like depression and anxiety  Your doctor will talk to you about the different types of screening tests. They can help you decide which screenings to have. They can also explain what the results might mean.   Answering questions - The physical is a good time to ask the doctor or nurse questions about your health. If needed, they can refer you to other doctors or specialists, too.  Adults older than 65 years often need other care, too. As you get older, your doctor will talk to you about:   How to prevent falling at " home   Hearing or vision tests   Memory testing   How to take your medicines safely   Making sure that you have the help and support you need at home  All topics are updated as new evidence becomes available and our peer review process is complete.  This topic retrieved from [employer] on: May 02, 2024.  Topic 947607 Version 1.0  Release: 32.4.3 - C32.122  © 2024 UpToDate, Inc. and/or its affiliates. All rights reserved.  Consumer Information Use and Disclaimer   Disclaimer: This generalized information is a limited summary of diagnosis, treatment, and/or medication information. It is not meant to be comprehensive and should be used as a tool to help the user understand and/or assess potential diagnostic and treatment options. It does NOT include all information about conditions, treatments, medications, side effects, or risks that may apply to a specific patient. It is not intended to be medical advice or a substitute for the medical advice, diagnosis, or treatment of a health care provider based on the health care provider's examination and assessment of a patient's specific and unique circumstances. Patients must speak with a health care provider for complete information about their health, medical questions, and treatment options, including any risks or benefits regarding use of medications. This information does not endorse any treatments or medications as safe, effective, or approved for treating a specific patient. UpToDate, Inc. and its affiliates disclaim any warranty or liability relating to this information or the use thereof.The use of this information is governed by the Terms of Use, available at https://www.woltersiNeeduwer.com/en/know/clinical-effectiveness-terms. 2024© UpToDate, Inc. and its affiliates and/or licensors. All rights reserved.  Copyright   © 2024 UpToDate, Inc. and/or its affiliates. All rights reserved.

## 2025-03-18 ENCOUNTER — RESULTS FOLLOW-UP (OUTPATIENT)
Age: 40
End: 2025-03-18

## 2025-03-18 ENCOUNTER — TELEPHONE (OUTPATIENT)
Age: 40
End: 2025-03-18

## 2025-03-18 DIAGNOSIS — D72.828 NEUTROPHILIA: Primary | ICD-10-CM

## 2025-03-18 DIAGNOSIS — D72.89 ATYPICAL LYMPHOCYTES PRESENT ON PERIPHERAL BLOOD SMEAR: ICD-10-CM

## 2025-03-18 LAB
ALBUMIN SERPL BCG-MCNC: 4.6 G/DL (ref 3.5–5)
ALP SERPL-CCNC: 47 U/L (ref 34–104)
ALT SERPL W P-5'-P-CCNC: 9 U/L (ref 7–52)
ANION GAP SERPL CALCULATED.3IONS-SCNC: 10 MMOL/L (ref 4–13)
AST SERPL W P-5'-P-CCNC: 16 U/L (ref 13–39)
BILIRUB SERPL-MCNC: 0.41 MG/DL (ref 0.2–1)
BUN SERPL-MCNC: 6 MG/DL (ref 5–25)
CALCIUM SERPL-MCNC: 9.3 MG/DL (ref 8.4–10.2)
CHLORIDE SERPL-SCNC: 102 MMOL/L (ref 96–108)
CHOLEST SERPL-MCNC: 174 MG/DL (ref ?–200)
CO2 SERPL-SCNC: 25 MMOL/L (ref 21–32)
CREAT SERPL-MCNC: 0.64 MG/DL (ref 0.6–1.3)
EST. AVERAGE GLUCOSE BLD GHB EST-MCNC: 111 MG/DL
FERRITIN SERPL-MCNC: 42 NG/ML (ref 11–307)
GFR SERPL CREATININE-BSD FRML MDRD: 111 ML/MIN/1.73SQ M
GLUCOSE P FAST SERPL-MCNC: 91 MG/DL (ref 65–99)
HBA1C MFR BLD: 5.5 %
HDLC SERPL-MCNC: 71 MG/DL
LDLC SERPL CALC-MCNC: 91 MG/DL (ref 0–100)
POTASSIUM SERPL-SCNC: 3.4 MMOL/L (ref 3.5–5.3)
PROT SERPL-MCNC: 7 G/DL (ref 6.4–8.4)
SODIUM SERPL-SCNC: 137 MMOL/L (ref 135–147)
TRIGL SERPL-MCNC: 60 MG/DL (ref ?–150)
TSH SERPL DL<=0.05 MIU/L-ACNC: 3.32 UIU/ML (ref 0.45–4.5)

## 2025-03-20 LAB
BASOPHILS # BLD AUTO: 0.09 THOUSAND/UL (ref 0–0.1)
BASOPHILS NFR MAR MANUAL: 1 % (ref 0–1)
BURR CELLS BLD QL SMEAR: PRESENT
EOSINOPHIL # BLD AUTO: 0.19 THOUSAND/UL (ref 0–0.61)
EOSINOPHIL NFR BLD MANUAL: 2 % (ref 0–6)
LG PLATELETS BLD QL SMEAR: PRESENT
LYMPHOCYTES # BLD AUTO: 17 %
LYMPHOCYTES # BLD AUTO: 3.44 THOUSAND/UL (ref 0.6–4.47)
MONOCYTES # BLD AUTO: 0.46 THOUSAND/UL (ref 0–1.22)
MONOCYTES NFR BLD AUTO: 5 % (ref 4–12)
NEUTS SEG # BLD: 5.11 THOUSAND/UL (ref 1.81–6.82)
NEUTS SEG NFR BLD AUTO: 55 %
PATHOLOGY REVIEW: YES
PLATELET BLD QL SMEAR: ADEQUATE
RBC MORPH BLD: PRESENT
SCAN RESULT: NORMAL
TOTAL CELLS COUNTED SPEC: 100
VARIANT LYMPHS # BLD AUTO: 20 % (ref 0–0)

## 2025-03-20 PROCEDURE — 85060 BLOOD SMEAR INTERPRETATION: CPT | Performed by: PATHOLOGY

## 2025-03-20 NOTE — TELEPHONE ENCOUNTER
Phone call from patient asking for the result from the path slide that was done she sees the result in mychart but doesn't understand result. Patient asking for call back to discuss.

## 2025-03-21 NOTE — TELEPHONE ENCOUNTER
----- Message from Diane Lorenzo DO sent at 3/21/2025  1:14 PM EDT -----  Please notify patient that her pathology report came back with some abnormalities in her white blood cell count.  At this point it is important that she follows up with hematology for further evaluation

## 2025-03-21 NOTE — TELEPHONE ENCOUNTER
Patient returning call, relayed results to patient as per provider message.         Patient  expressed understanding and was given the number to [facility]  [phone] as a referral was place. No further questions

## 2025-03-24 ENCOUNTER — TELEPHONE (OUTPATIENT)
Age: 40
End: 2025-03-24

## 2025-03-24 ENCOUNTER — OFFICE VISIT (OUTPATIENT)
Dept: HEMATOLOGY ONCOLOGY | Facility: CLINIC | Age: 40
End: 2025-03-24
Payer: COMMERCIAL

## 2025-03-24 ENCOUNTER — TELEPHONE (OUTPATIENT)
Dept: HEMATOLOGY ONCOLOGY | Facility: CLINIC | Age: 40
End: 2025-03-24

## 2025-03-24 ENCOUNTER — APPOINTMENT (OUTPATIENT)
Dept: LAB | Facility: HOSPITAL | Age: 40
End: 2025-03-24
Payer: COMMERCIAL

## 2025-03-24 VITALS
TEMPERATURE: 97.6 F | HEART RATE: 92 BPM | HEIGHT: 70 IN | WEIGHT: 130 LBS | SYSTOLIC BLOOD PRESSURE: 118 MMHG | BODY MASS INDEX: 18.61 KG/M2 | DIASTOLIC BLOOD PRESSURE: 70 MMHG | OXYGEN SATURATION: 99 %

## 2025-03-24 DIAGNOSIS — D72.828 NEUTROPHILIA: ICD-10-CM

## 2025-03-24 DIAGNOSIS — D72.820 LYMPHOCYTOSIS: ICD-10-CM

## 2025-03-24 DIAGNOSIS — D72.89 ATYPICAL LYMPHOCYTES PRESENT ON PERIPHERAL BLOOD SMEAR: ICD-10-CM

## 2025-03-24 DIAGNOSIS — R63.4 WEIGHT LOSS: ICD-10-CM

## 2025-03-24 DIAGNOSIS — D72.820 LYMPHOCYTOSIS: Primary | ICD-10-CM

## 2025-03-24 DIAGNOSIS — Z72.0 NICOTINE ABUSE: ICD-10-CM

## 2025-03-24 LAB
BASOPHILS # BLD AUTO: 0.05 THOUSANDS/ÂΜL (ref 0–0.1)
BASOPHILS NFR BLD AUTO: 1 % (ref 0–1)
EOSINOPHIL # BLD AUTO: 0.18 THOUSAND/ÂΜL (ref 0–0.61)
EOSINOPHIL NFR BLD AUTO: 2 % (ref 0–6)
ERYTHROCYTE [DISTWIDTH] IN BLOOD BY AUTOMATED COUNT: 13.8 % (ref 11.6–15.1)
HAV IGM SER QL: NORMAL
HBV CORE IGM SER QL: NORMAL
HBV SURFACE AG SER QL: NORMAL
HCT VFR BLD AUTO: 38.7 % (ref 34.8–46.1)
HCV AB SER QL: NORMAL
HGB BLD-MCNC: 12.8 G/DL (ref 11.5–15.4)
IMM GRANULOCYTES # BLD AUTO: 0.02 THOUSAND/UL (ref 0–0.2)
IMM GRANULOCYTES NFR BLD AUTO: 0 % (ref 0–2)
LDH SERPL-CCNC: 111 U/L (ref 140–271)
LYMPHOCYTES # BLD AUTO: 2.09 THOUSANDS/ÂΜL (ref 0.6–4.47)
LYMPHOCYTES NFR BLD AUTO: 22 % (ref 14–44)
MCH RBC QN AUTO: 30.4 PG (ref 26.8–34.3)
MCHC RBC AUTO-ENTMCNC: 33.1 G/DL (ref 31.4–37.4)
MCV RBC AUTO: 92 FL (ref 82–98)
MONOCYTES # BLD AUTO: 0.71 THOUSAND/ÂΜL (ref 0.17–1.22)
MONOCYTES NFR BLD AUTO: 7 % (ref 4–12)
NEUTROPHILS # BLD AUTO: 6.58 THOUSANDS/ÂΜL (ref 1.85–7.62)
NEUTS SEG NFR BLD AUTO: 68 % (ref 43–75)
NRBC BLD AUTO-RTO: 0 /100 WBCS
PLATELET # BLD AUTO: 229 THOUSANDS/UL (ref 149–390)
PMV BLD AUTO: 10.7 FL (ref 8.9–12.7)
RBC # BLD AUTO: 4.21 MILLION/UL (ref 3.81–5.12)
RETICS # AUTO: ABNORMAL 10*3/UL (ref 14097–95744)
RETICS # CALC: 2.58 % (ref 0.37–1.87)
WBC # BLD AUTO: 9.63 THOUSAND/UL (ref 4.31–10.16)

## 2025-03-24 PROCEDURE — 84165 PROTEIN E-PHORESIS SERUM: CPT

## 2025-03-24 PROCEDURE — 86780 TREPONEMA PALLIDUM: CPT

## 2025-03-24 PROCEDURE — 36415 COLL VENOUS BLD VENIPUNCTURE: CPT

## 2025-03-24 PROCEDURE — 86225 DNA ANTIBODY NATIVE: CPT

## 2025-03-24 PROCEDURE — 85045 AUTOMATED RETICULOCYTE COUNT: CPT

## 2025-03-24 PROCEDURE — 86663 EPSTEIN-BARR ANTIBODY: CPT

## 2025-03-24 PROCEDURE — 87389 HIV-1 AG W/HIV-1&-2 AB AG IA: CPT

## 2025-03-24 PROCEDURE — 86664 EPSTEIN-BARR NUCLEAR ANTIGEN: CPT

## 2025-03-24 PROCEDURE — 86665 EPSTEIN-BARR CAPSID VCA: CPT

## 2025-03-24 PROCEDURE — 86038 ANTINUCLEAR ANTIBODIES: CPT

## 2025-03-24 PROCEDURE — 80074 ACUTE HEPATITIS PANEL: CPT

## 2025-03-24 PROCEDURE — 85025 COMPLETE CBC W/AUTO DIFF WBC: CPT

## 2025-03-24 PROCEDURE — 99214 OFFICE O/P EST MOD 30 MIN: CPT | Performed by: INTERNAL MEDICINE

## 2025-03-24 PROCEDURE — 83615 LACTATE (LD) (LDH) ENZYME: CPT

## 2025-03-24 NOTE — ASSESSMENT & PLAN NOTE
Patient counseled extensively to quit smoking.  Can be referred to smoking cessation program as well.

## 2025-03-24 NOTE — PROGRESS NOTES
Name: Yoly Castro      : 1985      MRN: [id number]  Encounter Provider: Pat Dykes  Encounter Date: 3/24/2025   Encounter department: [facility] [place]  :  Assessment & Plan  Lymphocytosis  Peripheral smear reviewed.  Some atypical lymphocytes seen these could be reactive.  Peripheral blood flow cytometry was negative.  On review of blood work it is noted that these findings have been present in the past as well.  I reassured patient and counseled her about different causes of atypical lymphocytosis.  These could be related to either infections, some endocrine problems, medications, autoimmune disorders, and less commonly lymphoproliferative disorders as well.  As an initial step given her recent exposures we will first look for any underlying infections contributing to it we will also do a autoimmune screen.  Patient agreed to be tested for HIV.  Will also look indirect bone marrow markers with LDH reticulocyte and serum protein electrophoresis.  Though I did  her that for definitive diagnosis of primary hematological disorders may require bone marrow studies or/and imaging studies.  Orders:    HIV 1/2 AB/AG w Reflex [facility] for 2 yr old and above; Future    Hepatitis panel, acute; Future    EBV Acute Panel; Future    JORGITO Screen w/Reflex Cascade; Future    LD,Blood; Future    Retic Count; Future    Protein electrophoresis, serum; Future    CBC and differential; Future    RPR-Syphilis Screening (Total Syphilis IGG/IGM); Future    Weight loss  Will first follow peripheral blood findings.  She has had scans in  which did not show any acute findings or any lymphadenopathy.  However can be considered if weight loss continues.  Orders:    HIV 1/2 AB/AG w Reflex [facility] for 2 yr old and above; Future    Hepatitis panel, acute; Future    EBV Acute Panel; Future    JORGITO Screen w/Reflex Cascade; Future    LD,Blood; Future    Retic Count; Future    Protein electrophoresis,  "serum; Future    CBC and differential; Future    RPR-Syphilis Screening (Total Syphilis IGG/IGM); Future    Nicotine abuse  Patient counseled extensively to quit smoking.  Can be referred to smoking cessation program as well.           Return in about 3 months (around 6/24/2025).    History of Present Illness   Chief Complaint   Patient presents with    Transition of Care Management     ABUNDIO KARMEN   Patient is a 39-year-old female past medical history of chronic smoking long-term, hypothyroidism, generalized anxiety disorder who was seen with hematology at another campus for neutrophilia.  This was felt to be reactive.  More recently her peripheral smear which was sent by her primary care physician showed some reactive atypical lymphocytes.  A peripheral blood flow cytometry did not show any immunophenotypic abnormality.  Patient is very concerned as she has been having significant fatigue and episodic fever which is causing her significant anxiety.  She does have a 3-year-old child who has had some recent viral infections as well.  In addition she also reports some sexual intercourse and is unsure of the sexual history of her partner.  Reports some weight loss.  No night sweats.  As mentioned does report some fever which are mostly in the evening she does report it is high-grade however not documented.  Denies any drug abuse.  Pertinent Medical History     03/24/25: Patient comes referred from her primary care physician because of some abnormal blood findings about which she is very worried.  She does suffer from significant joint aches especially involving her knee.  Denies any new medications.     Review of Systems  Medical History Reviewed by provider this encounter:     .      Objective   /70 (BP Location: Right arm, Patient Position: Sitting)   Pulse 92   Temp 97.6 °F (36.4 °C) (Temporal)   Ht 5' 10\" (1.778 m)   Wt 59 kg (130 lb)   SpO2 99%   BMI 18.65 kg/m²     Physical Exam    Labs: I have " reviewed the following labs:  Results for orders placed or performed in visit on 03/17/25   Comprehensive metabolic panel   Result Value Ref Range    Sodium 137 135 - 147 mmol/L    Potassium 3.4 (L) 3.5 - 5.3 mmol/L    Chloride 102 96 - 108 mmol/L    CO2 25 21 - 32 mmol/L    ANION GAP 10 4 - 13 mmol/L    BUN 6 5 - 25 mg/dL    Creatinine 0.64 0.60 - 1.30 mg/dL    Glucose, Fasting 91 65 - 99 mg/dL    Calcium 9.3 8.4 - 10.2 mg/dL    AST 16 13 - 39 U/L    ALT 9 7 - 52 U/L    Alkaline Phosphatase 47 34 - 104 U/L    Total Protein 7.0 6.4 - 8.4 g/dL    Albumin 4.6 3.5 - 5.0 g/dL    Total Bilirubin 0.41 0.20 - 1.00 mg/dL    eGFR 111 ml/min/1.73sq m   Lipid Panel with Direct LDL reflex   Result Value Ref Range    Cholesterol 174 See Comment mg/dL    Triglycerides 60 See Comment mg/dL    HDL, Direct 71 >=50 mg/dL    LDL Calculated 91 0 - 100 mg/dL   TSH, 3rd generation with Free T4 reflex   Result Value Ref Range    TSH 3RD GENERATON 3.318 0.450 - 4.500 uIU/mL   CBC and differential   Result Value Ref Range    WBC 9.29 4.31 - 10.16 Thousand/uL    RBC 4.34 3.81 - 5.12 Million/uL    Hemoglobin 13.3 11.5 - 15.4 g/dL    Hematocrit 39.6 34.8 - 46.1 %    MCV 91 82 - 98 fL    MCH 30.6 26.8 - 34.3 pg    MCHC 33.6 31.4 - 37.4 g/dL    RDW 13.5 11.6 - 15.1 %    MPV 11.3 8.9 - 12.7 fL    Platelets 264 149 - 390 Thousands/uL    nRBC 0 /100 WBCs   Hemoglobin A1C   Result Value Ref Range    Hemoglobin A1C 5.5 Normal 4.0-5.6%; PreDiabetic 5.7-6.4%; Diabetic >=6.5%; Glycemic control for adults with diabetes <7.0% %     mg/dl   Peripheral Smear   Result Value Ref Range    Total Counted 100     Segmented % 55 %    Lymphocytes % 17 %    Monocytes % 5 4 - 12 %    Eosinophils % 2 0 - 6 %    Basophils % 1 0 - 1 %    Atypical Lymphocytes % 20 (H) 0 - 0 %    Absolute Neutrophils 5.11 1.81 - 6.82 Thousand/uL    Absolute Lymphocytes 3.44 0.60 - 4.47 Thousand/uL    Absolute Monocytes 0.46 0.00 - 1.22 Thousand/uL    Absolute Eosinophils 0.19 0.00  - 0.61 Thousand/uL    Absolute Basophils 0.09 0.00 - 0.10 Thousand/uL    RBC Morphology Present     Platelet Estimate Adequate Adequate    Large Platelet Present     Pathology Review Yes (A) No    Brigitte Cells Present    Result Value Ref Range    Ferritin 42 11 - 307 ng/mL   Leukemia/Lymphoma flow cytometry   Result Value Ref Range    Scan Result SEE WRITTEN REPORT    Path Slide Review   Result Value Ref Range    Case Report       Clinical Pathology Report                         Case: [id number]                                  Authorizing Provider:  Diane Lorenzo,   Collected:           03/17/2025 1432                                     DO                                                                           Ordering Location:     [facility]      Received:            03/18/2025 0732                                     [facility] [place]                                                          Pathologist:           Kyler Moralez MD                                                          Specimen:    Arm, Right                                                                                 Path Slide       Peripheral blood smear review shows white blood cells with an increased population of atypical/reactive lymphocytes with slight cellular enlargement, irregular chromatin and nuclear contours with occasional large granular forms.  Granulocytes show normal distribution and morphology without distinct features of atypia/dysplasia or circulating blasts.  Red blood cells and platelets are without distinct diagnostic morphologic abnormality.  The patient's history of previously identified atypical lymphocytes is noted and therefore this sample was submitted for flow cytometric analysis (see below for full report) which shows no immunophenotypic abnormalities or abnormal lymphoid population.  Overall, the combination of findings is not definitively specific.  The presence of  reactive/atypical lymphocytes may indicate infection, drug effect, toxin exposure, postvaccination, autoimmune disease, or other systemic inflammatory condition.  Alternatively, the possibility of a lymphoproliferative disorder cannot be entirely noted, though is not distinctly favored in the presence of a normal flow cytometry.  Correlation with clinical impression and other laboratory findings is recommended.  If clinical concern for more significant disease process exists then consideration for hematology/oncology consultation may be of assistance to further evaluate as clinically indicated.             Radiology Results Review: I have reviewed radiology reports from prior scans including: CT abdomen/pelvis and procedure reports.    Administrative Statements   I have spent a total time of 35 minutes in caring for this patient on the day of the visit/encounter including Diagnostic results, Prognosis, Risks and benefits of tx options, Instructions for management, Patient and family education, Importance of tx compliance, Risk factor reductions, Impressions, Counseling / Coordination of care, Documenting in the medical record, Reviewing/placing orders in the medical record (including tests, medications, and/or procedures), Obtaining or reviewing history  , and Communicating with other healthcare professionals .

## 2025-03-24 NOTE — TELEPHONE ENCOUNTER
Patient calling in, stated that she called her PCP and found out what the diagnosis codes are. She stated she is under the impression that this might be a cancer related diagnosis and would prefer to see an MD if so/if this is something an [facility] cannot treat so she does not have to come in 2 times. Please look into this and get back to her to let her know either way at [phone]    Diagnosis codes - nitrophilia d72.828  atypical (patient was not sure what this was) d782.89

## 2025-03-25 LAB
ALBUMIN SERPL ELPH-MCNC: 4.06 G/DL (ref 3.2–5.1)
ALBUMIN SERPL ELPH-MCNC: 62.4 % (ref 48–70)
ALPHA1 GLOB SERPL ELPH-MCNC: 0.33 G/DL (ref 0.15–0.47)
ALPHA1 GLOB SERPL ELPH-MCNC: 5 % (ref 1.8–7)
ALPHA2 GLOB SERPL ELPH-MCNC: 0.72 G/DL (ref 0.42–1.04)
ALPHA2 GLOB SERPL ELPH-MCNC: 11 % (ref 5.9–14.9)
BETA GLOB ABNORMAL SERPL ELPH-MCNC: 0.34 G/DL (ref 0.31–0.57)
BETA1 GLOB SERPL ELPH-MCNC: 5.3 % (ref 4.7–7.7)
BETA2 GLOB SERPL ELPH-MCNC: 4.9 % (ref 3.1–7.9)
BETA2+GAMMA GLOB SERPL ELPH-MCNC: 0.32 G/DL (ref 0.2–0.58)
EBV EA IGG SER QL IA: NEGATIVE
EBV NA IGG SER QL IA: POSITIVE
EBV VCA IGG SER QL IA: POSITIVE
EBV VCA IGM SER QL IA: NEGATIVE
GAMMA GLOB ABNORMAL SERPL ELPH-MCNC: 0.74 G/DL (ref 0.4–1.66)
GAMMA GLOB SERPL ELPH-MCNC: 11.4 % (ref 6.9–22.3)
HIV 1+2 AB+HIV1 P24 AG SERPL QL IA: NORMAL
IGG/ALB SER: 1.66 {RATIO} (ref 1.1–1.8)
PROT PATTERN SERPL ELPH-IMP: NORMAL
PROT SERPL-MCNC: 6.5 G/DL (ref 6.4–8.2)
TREPONEMA PALLIDUM IGG+IGM AB [PRESENCE] IN SERUM OR PLASMA BY IMMUNOASSAY: NORMAL

## 2025-03-25 PROCEDURE — 84165 PROTEIN E-PHORESIS SERUM: CPT | Performed by: PATHOLOGY

## 2025-03-27 LAB
DSDNA IGG SERPL IA-ACNC: <0.9 IU/ML (ref ?–15)
NUCLEAR IGG SER IA-RTO: 0.2 RATIO (ref ?–1)

## 2025-03-28 ENCOUNTER — TELEPHONE (OUTPATIENT)
Age: 40
End: 2025-03-28

## 2025-03-28 NOTE — TELEPHONE ENCOUNTER
I called and spoke to patient to let her know we received her call. Dr. Dykes is out of office until 4/1. She is aware I will send for Dr. Dykes to review when she returns, and will give her a call back. Patient verbalized understanding.     Dr. Dykes- please review patients lab results. Thanks!

## 2025-03-28 NOTE — TELEPHONE ENCOUNTER
Received a phone call from patient.  Patient inquiring about lab results.  Please review and call patient to discuss.

## 2025-04-01 ENCOUNTER — TELEPHONE (OUTPATIENT)
Age: 40
End: 2025-04-01

## 2025-04-01 NOTE — TELEPHONE ENCOUNTER
Pt calling to ask about about the message left from Dr. Dykes.    Reviewed with the pt: Blood work is essentially unremarkable/normal .    Pt was thankful for this message.

## 2025-04-03 ENCOUNTER — HOSPITAL ENCOUNTER (OUTPATIENT)
Age: 40
Discharge: HOME | End: 2025-04-03
Payer: COMMERCIAL

## 2025-04-03 DIAGNOSIS — Z12.31 ENCOUNTER FOR SCREENING MAMMOGRAM FOR BREAST CANCER: ICD-10-CM

## 2025-04-03 PROCEDURE — 77063 BREAST TOMOSYNTHESIS BI: CPT

## 2025-04-03 PROCEDURE — 77067 SCR MAMMO BI INCL CAD: CPT

## 2025-04-12 DIAGNOSIS — F41.1 GAD (GENERALIZED ANXIETY DISORDER): ICD-10-CM

## 2025-04-14 ENCOUNTER — TELEPHONE (OUTPATIENT)
Age: 40
End: 2025-04-14

## 2025-04-14 RX ORDER — CLONAZEPAM 2 MG/1
2 TABLET ORAL 2 TIMES DAILY
Qty: 60 TABLET | Refills: 0 | Status: SHIPPED | OUTPATIENT
Start: 2025-04-14 | End: 2025-05-14

## 2025-04-14 NOTE — TELEPHONE ENCOUNTER
Reason for call:   [x] Refill   [] Prior Auth  [] Other:      Office:   [x] PCP/Provider - Bj  [] Specialty/Provider -      Medication:   Clonazepam 2 mg, 1 bid, 60     Pharmacy:   [facility]   Does the patient have enough for 3 days?   [] Yes   [x] No - Send as HP to POD     Mail Away Pharmacy   Does the patient have enough for 10 days?   [] Yes   [] No - Send as HP to POD        
no fever

## 2025-04-14 NOTE — TELEPHONE ENCOUNTER
PA for clonazePAM (KlonoPIN) 2 mg SUBMITTED to [employer]    via    []CMM-KEY:   []Surescripts-Case ID #   []Availity-Auth ID # NDC #   []Faxed to plan   [x]Other website [employer] - [id number]  []Phone call Case ID #     []PA sent as URGENT    All office notes, labs and other pertaining documents and studies sent. Clinical questions answered. Awaiting determination from insurance company.     Turnaround time for your insurance to make a decision on your Prior Authorization can take 7-21 business days.

## 2025-04-22 NOTE — TELEPHONE ENCOUNTER
Called Juan regarding Prior Auth per [employer]. Spoke with Aminah, claim denied because medication does not need a prior auth. Paid claim on file 4/14/2025. Medication will be filled with no prior auth needed for patient.

## 2025-05-09 DIAGNOSIS — F41.1 GAD (GENERALIZED ANXIETY DISORDER): ICD-10-CM

## 2025-05-09 RX ORDER — CLONAZEPAM 2 MG/1
2 TABLET ORAL 2 TIMES DAILY
Qty: 60 TABLET | Refills: 0 | Status: SHIPPED | OUTPATIENT
Start: 2025-05-12 | End: 2025-06-11

## 2025-05-09 NOTE — TELEPHONE ENCOUNTER
Reason for call:   [x] Refill   [] Prior Auth  [] Other:     Office:   [x] PCP/Provider - [facility]  [] Specialty/Provider -     Medication: clonazePAM (KlonoPIN) 2 mg tablet     Dose/Frequency: 2 mg, 2 times daily     Quantity: 60    Pharmacy: [id number]    Local Pharmacy   Does the patient have enough for 3 days?   [x] Yes   [] No - Send as HP to POD    Mail Away Pharmacy   Does the patient have enough for 10 days?   [] Yes   [] No - Send as HP to POD

## 2025-06-10 DIAGNOSIS — F41.1 GAD (GENERALIZED ANXIETY DISORDER): ICD-10-CM

## 2025-06-10 RX ORDER — CLONAZEPAM 2 MG/1
2 TABLET ORAL 2 TIMES DAILY
Qty: 60 TABLET | Refills: 2 | Status: SHIPPED | OUTPATIENT
Start: 2025-06-10 | End: 2025-09-08

## 2025-06-10 NOTE — TELEPHONE ENCOUNTER
Reason for call:   [x] Refill   [] Prior Auth  [] Other:     Office:   [x] PCP/Provider - [facility] [place]  Authorized By: Diane Lorenzo DO    [] Specialty/Provider -     Medication: clonazePAM (KlonoPIN) 2 mg tablet    Dose/Frequency: Take 1 tablet (2 mg total) by mouth 2 (two) times a day    Quantity: 60    Pharmacy: [id number] [place], [place]    Local Pharmacy   Does the patient have enough for 3 days?   [x] Yes   [] No - Send as HP to POD    Mail Away Pharmacy   Does the patient have enough for 10 days?   [] Yes   [] No - Send as HP to POD

## 2025-06-16 ENCOUNTER — TELEPHONE (OUTPATIENT)
Dept: HEMATOLOGY ONCOLOGY | Facility: CLINIC | Age: 40
End: 2025-06-16

## 2025-06-26 DIAGNOSIS — E03.9 ACQUIRED HYPOTHYROIDISM: ICD-10-CM

## 2025-06-27 RX ORDER — LEVOTHYROXINE SODIUM 50 UG/1
50 TABLET ORAL
Qty: 30 TABLET | Refills: 5 | Status: SHIPPED | OUTPATIENT
Start: 2025-06-27 | End: 2025-12-24

## 2025-07-08 ENCOUNTER — TELEPHONE (OUTPATIENT)
Age: 40
End: 2025-07-08

## 2025-07-08 NOTE — TELEPHONE ENCOUNTER
Patient called requesting refill for Clonazepam. Patient made aware medication was refilled on 6/10/2025 for 60 tabs 30 day supply [facility]. Patient instructed to contact the pharmacy and speak with someone directly to obtain refills of medication. Patient advised to call back for refill if their pharmacy is unable to assist them. Patient verbalized understanding.

## 2025-08-07 ENCOUNTER — TELEPHONE (OUTPATIENT)
Age: 40
End: 2025-08-07

## 2025-08-07 DIAGNOSIS — F41.1 GAD (GENERALIZED ANXIETY DISORDER): ICD-10-CM

## 2025-08-08 RX ORDER — CLONAZEPAM 2 MG/1
2 TABLET ORAL 2 TIMES DAILY
Qty: 60 TABLET | Refills: 0 | Status: SHIPPED | OUTPATIENT
Start: 2025-08-08